# Patient Record
Sex: FEMALE | Race: WHITE | URBAN - METROPOLITAN AREA
[De-identification: names, ages, dates, MRNs, and addresses within clinical notes are randomized per-mention and may not be internally consistent; named-entity substitution may affect disease eponyms.]

---

## 2017-12-05 ENCOUNTER — APPOINTMENT (OUTPATIENT)
Dept: GENERAL RADIOLOGY | Facility: CLINIC | Age: 62
End: 2017-12-05
Attending: EMERGENCY MEDICINE
Payer: COMMERCIAL

## 2017-12-05 ENCOUNTER — HOSPITAL ENCOUNTER (EMERGENCY)
Facility: CLINIC | Age: 62
Discharge: HOME | End: 2017-12-05
Attending: EMERGENCY MEDICINE | Admitting: EMERGENCY MEDICINE
Payer: COMMERCIAL

## 2017-12-05 VITALS
BODY MASS INDEX: 31.54 KG/M2 | DIASTOLIC BLOOD PRESSURE: 84 MMHG | WEIGHT: 178 LBS | HEIGHT: 63 IN | SYSTOLIC BLOOD PRESSURE: 147 MMHG | TEMPERATURE: 98 F | HEART RATE: 87 BPM | RESPIRATION RATE: 18 BRPM | OXYGEN SATURATION: 96 %
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
## 2017-12-05 DIAGNOSIS — S01.81XA LACERATION OF BROW WITHOUT COMPLICATION, INITIAL ENCOUNTER: ICD-10-CM

## 2017-12-05 DIAGNOSIS — S42.211A FX HUMERAL NECK, RIGHT, CLOSED, INITIAL ENCOUNTER: ICD-10-CM

## 2017-12-05 PROCEDURE — 73030 X-RAY EXAM OF SHOULDER: CPT | Mod: RT

## 2017-12-05 PROCEDURE — 99284 EMERGENCY DEPT VISIT MOD MDM: CPT | Mod: 25

## 2017-12-05 PROCEDURE — 23620 CLTX GR HMRL TBRS FX WO MNPJ: CPT | Mod: RT

## 2017-12-05 ASSESSMENT — ENCOUNTER SYMPTOMS
HEADACHES: 0
BACK PAIN: 0
NECK PAIN: 0
ABDOMINAL PAIN: 0
VOMITING: 0

## 2017-12-05 NOTE — ED AVS SNAPSHOT
Emergency Department    [address] [place] [address]    Phone:  [phone]    Fax:  [phone]                                       Sarahy Joshi   MRN: [id number]    Department:   Emergency Department   Date of Visit:  12/5/2017           After Visit Summary Signature Page     I have received my discharge instructions, and my questions have been answered. I have discussed any challenges I see with this plan with the nurse or doctor.    ..........................................................................................................................................  Patient/Patient Representative Signature      ..........................................................................................................................................  Patient Representative Print Name and Relationship to Patient    ..................................................               ................................................  Date                                            Time    ..........................................................................................................................................  Reviewed by Signature/Title    ...................................................              ..............................................  Date                                                            Time

## 2017-12-05 NOTE — ED AVS SNAPSHOT
Emergency Department    [address] [facility] [address]    Phone:  [phone]    Fax:  [phone]                                       Sarahy Joshi   MRN: [id number]    Department:   Emergency Department   Date of Visit:  12/5/2017           Patient Information     Date Of Birth          1955        Your diagnoses for this visit were:     Fx humeral neck, right, closed, initial encounter     Laceration of brow without complication, initial encounter     Fall, initial encounter        You were seen by Deandra Gilliam MD.      Follow-up Information     Follow up with Gentry Bynum MD. Schedule an appointment as soon as possible for a visit in 3 days.    Specialty:  Orthopaedic Surgery    Contact information:    [facility]  [address] [facility] [address]  [phone]          Discharge Instructions       Ice to your shoulder, shoulder immobilizer, use your pain medicine as directed.  Set an appointment up with the orthopedic doctor later this week.        Discharge References/Attachments     FRACTURE, SHOULDER (ENGLISH)      24 Hour Appointment Hotline       To make an appointment at any [facility], call [phone] ([phone]). If you don't have a family doctor or clinic, we will help you find one. [facility] clinics are conveniently located to serve the needs of you and your family.             Review of your medicines      Notice     You have not been prescribed any medications.            Procedures and tests performed during your visit     Shoulder XR, G/E 3 views, right      Orders Needing Specimen Collection     None      Pending Results     No orders found from 12/3/2017 to 12/6/2017.            Pending Culture Results     No orders found from 12/3/2017 to 12/6/2017.            Pending Results Instructions     If you had any lab results that were not finalized at the time of your Discharge, you can call the ED Lab Result RN at [phone].  You will be contacted by this team for any positive Lab results or changes in treatment. The nurses are available 7 days a week from 10A to 6:30P.  You can leave a message 24 hours per day and they will return your call.        Test Results From Your Hospital Stay        12/5/2017  5:40 PM      Narrative     SHOULDER RIGHT THREE OR MORE VIEWS    12/5/2017 5:20 PM     HISTORY: Fall Sunday morning, swelling and pain.    COMPARISON: None.        Impression     IMPRESSION: There is a fracture through the greater tuberosity which  is nondisplaced. There is also a nondisplaced fracture through the  surgical neck of the proximal humerus. There is no evidence for  dislocation.    LEVI WINTERS MD                Clinical Quality Measure: Blood Pressure Screening     Your blood pressure was checked while you were in the emergency department today. The last reading we obtained was  BP: 147/84 . Please read the guidelines below about what these numbers mean and what you should do about them.  If your systolic blood pressure (the top number) is less than 120 and your diastolic blood pressure (the bottom number) is less than 80, then your blood pressure is normal. There is nothing more that you need to do about it.  If your systolic blood pressure (the top number) is 120-139 or your diastolic blood pressure (the bottom number) is 80-89, your blood pressure may be higher than it should be. You should have your blood pressure rechecked within a year by a primary care provider.  If your systolic blood pressure (the top number) is 140 or greater or your diastolic blood pressure (the bottom number) is 90 or greater, you may have high blood pressure. High blood pressure is treatable, but if left untreated over time it can put you at risk for heart attack, stroke, or kidney failure. You should have your blood pressure rechecked by a primary care provider within the next 4 weeks.  If your provider in the emergency department today gave  "you specific instructions to follow-up with your doctor or provider even sooner than that, you should follow that instruction and not wait for up to 4 weeks for your follow-up visit.        Thank you for choosing [facility]       Thank you for choosing [facility] for your care. Our goal is always to provide you with excellent care. Hearing back from our patients is one way we can continue to improve our services. Please take a few minutes to complete the written survey that you may receive in the mail after you visit with us. Thank you!        [employer] Information     [employer] lets you send messages to your doctor, view your test results, renew your prescriptions, schedule appointments and more. To sign up, go to [email or web address] . Click on \"Log in\" on the left side of the screen, which will take you to the Welcome page. Then click on \"Sign up Now\" on the right side of the page.     You will be asked to enter the access code listed below, as well as some personal information. Please follow the directions to create your username and password.     Your access code is: [email or web address]  Expires: 3/5/2018  5:07 PM     Your access code will  in 90 days. If you need help or a new code, please call your [facility] clinic or [phone].        Care EveryWhere ID     This is your Care EveryWhere ID. This could be used by other organizations to access your [facility] medical records  [id number]        Equal Access to Services     ROSALIA FINK : Hadii perla Burroughs, waaxda greta, qaybta mejiaalmabrant vargas . So [facility] [phone].    ATENCIÓN: Si habla español, tiene a pa disposición servicios gratuitos de asistencia lingüística. Llame al [phone].    We comply with applicable federal civil rights laws and [place] laws. We do not discriminate on the basis of race, color, national origin, age, disability, sex, sexual orientation, or gender identity.            After " Visit Summary       This is your record. Keep this with you and show to your community pharmacist(s) and doctor(s) at your next visit.

## 2017-12-05 NOTE — DISCHARGE INSTRUCTIONS
Ice to your shoulder, shoulder immobilizer, use your pain medicine as directed.  Set an appointment up with the orthopedic doctor later this week.

## 2017-12-05 NOTE — ED PROVIDER NOTES
"  History     Chief Complaint:  Fall      HPI   Sarahy Joshi is a 62 year old diabetic female who presents after a fall 2 days ago where she hit her head on tile after tripping on her purse overnight, now experiencing right shoulder pain. She states the pain radiates down her right arm, and that she is unable to move it. Patient denies headache and vomiting. Neck pain, back pain, chest pain and abdominal pain are also denied by the patient.  She did bump her head and had a cut in her right eyebrow which her sister glued and she has no complaints of pain or vision problems.  No neurologic symptoms distally.    Allergies:  Hmg-Coa inhibitors     Medications:    Norco 10 mg  MS contin 15 mg    Past Medical History:    Arthritis  Depressin  Diabetes  Fibromyalgia  GERD  HTN    Past Surgical History:    Appendectomy  Cholecystectomy  ENT surgery  Gyn surgery  Orthopedic surgery    Family History:    History reviewed. No pertinent family history.    Social History:  Marital Status:   Presents to the ED with family.   Tobacco Use: Current 0.50 ppd smoker  Alcohol Use: No    Review of Systems   Cardiovascular: Negative for chest pain.   Gastrointestinal: Negative for abdominal pain and vomiting.   Musculoskeletal: Negative for back pain and neck pain.        Positive for shoulder pain.    Neurological: Negative for headaches.   All other systems reviewed and are negative.      Physical Exam   First Vitals:  BP: 147/84  Pulse: 87  Temp: 98  F (36.7  C)  Resp: 18  Height: 160 cm (5' 3\")  Weight: 80.7 kg (178 lb)  SpO2: 96 %      Physical Exam  I reviewed the vital signs and nursing notes.  General: The patient is holding her right shoulder and grimaces in pain if she accidentally moves.  Musculoskeletal: No neck tenderness, she has pain and some swelling right over the shoulder itself.  Some distal clavicle tenderness but no clavicle deformity.  Left arm is normal, elbow and wrist are normal on the right.  Lower " extremities are normal without tenderness, no spinal tenderness.  Neuro: Normal sensation to light touch in both hands, normal wrist flexion and extension.  I cannot test strength of the upper arm because of the pain.  She is awake and alert, GCS of 15.  HEENT: There is a 3 cm laceration that has good wound edge approximation and is scabbed over and the right eyebrow.  No blood from the nose or ears.  No other evidence of facial or scalp trauma.  Eyes: Normal.  Pupils are equal.  Skin: The laceration in the right eyebrow but no other bruises are noted.    Cardiovascular: Heart sounds are normal, some mild right anterior chest wall tenderness near the shoulder but no bruising.  No other rib tenderness.  Strong radial pulses bilaterally and normal capillary refill with good perfusion.  Pulmonary: Lungs are clear, no rib tenderness posteriorly just right upper anterior chest wall.  Abdomen: Soft and nontender.    Emergency Department Course     Imaging:  Right Shoulder XR, per radiology:   IMPRESSION: There is a fracture through the greater tuberosity which  is nondisplaced. There is also a nondisplaced fracture through the  surgical neck of the proximal humerus. There is no evidence for  dislocation.    Radiographic findings were communicated with the patient who voiced understanding of the findings.    Emergency Department Course:  Nursing notes and vitals reviewed.  1655: I performed an exam of the patient as documented above.  The above workup was undertaken.  1733: I rechecked the patient and discussed results.  Findings and plan explained to the Patient. Patient discharged home, status improved, with instructions regarding supportive care, medications, and reasons to return as well as the importance of close follow-up was reviewed.     Impression & Plan      Medical Decision Making:  Patient comes in after falling 2 days ago.  She has a lot of pain in her shoulder, x-ray confirms a humeral neck fracture with  minimal displacement.  She has a laceration above her right eye in the eyebrow which looks to be healing very well and does not need any further management.  She has no neck pain or neurologic symptoms.  CMS is intact distally in her hand and she is able to extend her wrist.  She was placed in a shoulder immobilizer and will see or so later this week.  No other apparent injuries.    Diagnosis:    ICD-10-CM    1. Fx humeral neck, right, closed, initial encounter S42.211A    2. Laceration of brow without complication, initial encounter S01.81XA    3. Fall, initial encounter W19.XXXA        Disposition:  Discharged to home.     Ice to your shoulder, should immobilizer, use your pain medication as directed. Set up an appointment with orthopedic doctor later this week.        I, Mariza Wolf, am serving as a scribe on 12/5/2017 at 4:55 PM to personally document services performed by Dr. Gilliam based on my observations and the provider's statements to me.    EMERGENCY DEPARTMENT       Deandra Gilliam MD  12/05/17 [id number]

## 2019-01-30 ENCOUNTER — HOSPITAL ENCOUNTER (INPATIENT)
Age: 64
LOS: 5 days | Discharge: HOME | DRG: 194 | End: 2019-02-04
Attending: EMERGENCY MEDICINE | Admitting: NEUROMUSCULOSKELETAL MEDICINE & OMM
Payer: MEDICARE

## 2019-01-30 ENCOUNTER — APPOINTMENT (OUTPATIENT)
Dept: GENERAL RADIOLOGY | Age: 64
DRG: 194 | End: 2019-01-30
Payer: MEDICARE

## 2019-01-30 DIAGNOSIS — D64.9 ANEMIA, UNSPECIFIED TYPE: ICD-10-CM

## 2019-01-30 DIAGNOSIS — J18.9 PNEUMONIA DUE TO ORGANISM: Primary | ICD-10-CM

## 2019-01-30 LAB
ABO/RH: NORMAL
ALBUMIN SERPL-MCNC: 3.6 G/DL (ref 3.5–5.2)
ALP BLD-CCNC: 95 U/L (ref 35–104)
ALT SERPL-CCNC: 17 U/L (ref 0–32)
ANION GAP SERPL CALCULATED.3IONS-SCNC: 14 MMOL/L (ref 7–16)
ANISOCYTOSIS: ABNORMAL
ANTIBODY SCREEN: NORMAL
APTT: 30.7 SEC (ref 24.5–35.1)
AST SERPL-CCNC: 25 U/L (ref 0–31)
BASOPHILS ABSOLUTE: 0.05 E9/L (ref 0–0.2)
BASOPHILS RELATIVE PERCENT: 0.6 % (ref 0–2)
BILIRUB SERPL-MCNC: 0.2 MG/DL (ref 0–1.2)
BLOOD BANK DISPENSE STATUS: NORMAL
BLOOD BANK DISPENSE STATUS: NORMAL
BLOOD BANK PRODUCT CODE: NORMAL
BLOOD BANK PRODUCT CODE: NORMAL
BPU ID: NORMAL
BPU ID: NORMAL
BUN BLDV-MCNC: 9 MG/DL (ref 8–23)
BURR CELLS: ABNORMAL
CALCIUM SERPL-MCNC: 8.8 MG/DL (ref 8.6–10.2)
CHLORIDE BLD-SCNC: 95 MMOL/L (ref 98–107)
CO2: 23 MMOL/L (ref 22–29)
CREAT SERPL-MCNC: 0.5 MG/DL (ref 0.5–1)
DESCRIPTION BLOOD BANK: NORMAL
DESCRIPTION BLOOD BANK: NORMAL
EOSINOPHILS ABSOLUTE: 0.1 E9/L (ref 0.05–0.5)
EOSINOPHILS RELATIVE PERCENT: 1.3 % (ref 0–6)
GFR AFRICAN AMERICAN: >60
GFR NON-AFRICAN AMERICAN: >60 ML/MIN/1.73
GLUCOSE BLD-MCNC: 171 MG/DL (ref 74–99)
HCT VFR BLD CALC: 27.4 % (ref 34–48)
HEMOGLOBIN: 6.9 G/DL (ref 11.5–15.5)
HYPOCHROMIA: ABNORMAL
IMMATURE GRANULOCYTES #: 0.02 E9/L
IMMATURE GRANULOCYTES %: 0.3 % (ref 0–5)
INR BLD: 1.1
LYMPHOCYTES ABSOLUTE: 0.83 E9/L (ref 1.5–4)
LYMPHOCYTES RELATIVE PERCENT: 10.7 % (ref 20–42)
MCH RBC QN AUTO: 17.9 PG (ref 26–35)
MCHC RBC AUTO-ENTMCNC: 25.2 % (ref 32–34.5)
MCV RBC AUTO: 71 FL (ref 80–99.9)
METER GLUCOSE: 83 MG/DL (ref 74–99)
MONOCYTES ABSOLUTE: 0.68 E9/L (ref 0.1–0.95)
MONOCYTES RELATIVE PERCENT: 8.8 % (ref 2–12)
NEUTROPHILS ABSOLUTE: 6.09 E9/L (ref 1.8–7.3)
NEUTROPHILS RELATIVE PERCENT: 78.3 % (ref 43–80)
OVALOCYTES: ABNORMAL
PDW BLD-RTO: 23.3 FL (ref 11.5–15)
PLATELET # BLD: 291 E9/L (ref 130–450)
PMV BLD AUTO: 10.7 FL (ref 7–12)
POIKILOCYTES: ABNORMAL
POLYCHROMASIA: ABNORMAL
POTASSIUM SERPL-SCNC: 4.1 MMOL/L (ref 3.5–5)
PROTHROMBIN TIME: 13.1 SEC (ref 9.3–12.4)
RBC # BLD: 3.86 E12/L (ref 3.5–5.5)
SODIUM BLD-SCNC: 132 MMOL/L (ref 132–146)
TEAR DROP CELLS: ABNORMAL
TOTAL PROTEIN: 6.8 G/DL (ref 6.4–8.3)
TROPONIN: <0.01 NG/ML (ref 0–0.03)
WBC # BLD: 7.8 E9/L (ref 4.5–11.5)

## 2019-01-30 PROCEDURE — 85730 THROMBOPLASTIN TIME PARTIAL: CPT

## 2019-01-30 PROCEDURE — 86901 BLOOD TYPING SEROLOGIC RH(D): CPT

## 2019-01-30 PROCEDURE — 85610 PROTHROMBIN TIME: CPT

## 2019-01-30 PROCEDURE — 96374 THER/PROPH/DIAG INJ IV PUSH: CPT

## 2019-01-30 PROCEDURE — 6360000002 HC RX W HCPCS: Performed by: EMERGENCY MEDICINE

## 2019-01-30 PROCEDURE — 86850 RBC ANTIBODY SCREEN: CPT

## 2019-01-30 PROCEDURE — 87040 BLOOD CULTURE FOR BACTERIA: CPT

## 2019-01-30 PROCEDURE — 2580000003 HC RX 258: Performed by: EMERGENCY MEDICINE

## 2019-01-30 PROCEDURE — 86923 COMPATIBILITY TEST ELECTRIC: CPT

## 2019-01-30 PROCEDURE — 99285 EMERGENCY DEPT VISIT HI MDM: CPT

## 2019-01-30 PROCEDURE — 71045 X-RAY EXAM CHEST 1 VIEW: CPT

## 2019-01-30 PROCEDURE — 94760 N-INVAS EAR/PLS OXIMETRY 1: CPT

## 2019-01-30 PROCEDURE — 36430 TRANSFUSION BLD/BLD COMPNT: CPT

## 2019-01-30 PROCEDURE — 86900 BLOOD TYPING SEROLOGIC ABO: CPT

## 2019-01-30 PROCEDURE — 84484 ASSAY OF TROPONIN QUANT: CPT

## 2019-01-30 PROCEDURE — P9016 RBC LEUKOCYTES REDUCED: HCPCS

## 2019-01-30 PROCEDURE — 80053 COMPREHEN METABOLIC PANEL: CPT

## 2019-01-30 PROCEDURE — 82962 GLUCOSE BLOOD TEST: CPT

## 2019-01-30 PROCEDURE — 36415 COLL VENOUS BLD VENIPUNCTURE: CPT

## 2019-01-30 PROCEDURE — 85025 COMPLETE CBC W/AUTO DIFF WBC: CPT

## 2019-01-30 PROCEDURE — 1200000000 HC SEMI PRIVATE

## 2019-01-30 RX ORDER — ROSUVASTATIN CALCIUM 20 MG/1
20 TABLET, COATED ORAL DAILY
COMMUNITY

## 2019-01-30 RX ORDER — 0.9 % SODIUM CHLORIDE 0.9 %
250 INTRAVENOUS SOLUTION INTRAVENOUS ONCE
Status: COMPLETED | OUTPATIENT
Start: 2019-01-30 | End: 2019-01-31

## 2019-01-30 RX ORDER — GLIPIZIDE 5 MG/1
10 TABLET ORAL
COMMUNITY

## 2019-01-30 RX ORDER — BUTALBITAL, ASPIRIN, AND CAFFEINE 325; 50; 40 MG/1; MG/1; MG/1
1 CAPSULE ORAL EVERY 8 HOURS PRN
COMMUNITY

## 2019-01-30 RX ORDER — PANTOPRAZOLE SODIUM 40 MG/1
40 TABLET, DELAYED RELEASE ORAL DAILY
COMMUNITY

## 2019-01-30 RX ORDER — MORPHINE SULFATE 30 MG/1
30 TABLET, FILM COATED, EXTENDED RELEASE ORAL 2 TIMES DAILY
Status: ON HOLD | COMMUNITY
End: 2019-02-04 | Stop reason: HOSPADM

## 2019-01-30 RX ORDER — NAPROXEN 500 MG/1
500 TABLET ORAL 2 TIMES DAILY PRN
Qty: 28 TABLET | Refills: 0 | Status: SHIPPED | OUTPATIENT
Start: 2019-01-30 | End: 2019-01-30 | Stop reason: CLARIF

## 2019-01-30 RX ORDER — HYDROCODONE BITARTRATE AND ACETAMINOPHEN 10; 325 MG/1; MG/1
1 TABLET ORAL EVERY 8 HOURS PRN
Status: ON HOLD | COMMUNITY
End: 2019-02-04 | Stop reason: HOSPADM

## 2019-01-30 RX ORDER — IBUPROFEN 200 MG
200 TABLET ORAL EVERY 6 HOURS PRN
COMMUNITY

## 2019-01-30 RX ADMIN — DEXTROSE MONOHYDRATE 1 G: 50 INJECTION, SOLUTION INTRAVENOUS at 16:52

## 2019-01-30 RX ADMIN — SODIUM CHLORIDE 250 ML: 9 INJECTION, SOLUTION INTRAVENOUS at 17:37

## 2019-01-30 ASSESSMENT — PAIN SCALES - GENERAL
PAINLEVEL_OUTOF10: 5
PAINLEVEL_OUTOF10: 8

## 2019-01-30 ASSESSMENT — PAIN DESCRIPTION - FREQUENCY: FREQUENCY: CONTINUOUS

## 2019-01-30 ASSESSMENT — PAIN DESCRIPTION - ONSET: ONSET: ON-GOING

## 2019-01-30 ASSESSMENT — PAIN DESCRIPTION - PAIN TYPE: TYPE: CHRONIC PAIN

## 2019-01-30 ASSESSMENT — PAIN DESCRIPTION - PROGRESSION: CLINICAL_PROGRESSION: NOT CHANGED

## 2019-01-30 ASSESSMENT — PAIN DESCRIPTION - LOCATION: LOCATION: GENERALIZED

## 2019-01-31 PROBLEM — D64.9 SEVERE ANEMIA: Status: ACTIVE | Noted: 2019-01-31

## 2019-01-31 LAB
ALBUMIN SERPL-MCNC: 4 G/DL (ref 3.5–5.2)
ALP BLD-CCNC: 104 U/L (ref 35–104)
ALT SERPL-CCNC: 17 U/L (ref 0–32)
ANION GAP SERPL CALCULATED.3IONS-SCNC: 12 MMOL/L (ref 7–16)
ANISOCYTOSIS: ABNORMAL
AST SERPL-CCNC: 21 U/L (ref 0–31)
BASOPHILS ABSOLUTE: 0.05 E9/L (ref 0–0.2)
BASOPHILS RELATIVE PERCENT: 0.7 % (ref 0–2)
BILIRUB SERPL-MCNC: 0.3 MG/DL (ref 0–1.2)
BUN BLDV-MCNC: 6 MG/DL (ref 8–23)
CALCIUM SERPL-MCNC: 9.2 MG/DL (ref 8.6–10.2)
CHLORIDE BLD-SCNC: 103 MMOL/L (ref 98–107)
CO2: 28 MMOL/L (ref 22–29)
CREAT SERPL-MCNC: 0.5 MG/DL (ref 0.5–1)
EOSINOPHILS ABSOLUTE: 0.02 E9/L (ref 0.05–0.5)
EOSINOPHILS RELATIVE PERCENT: 0.3 % (ref 0–6)
FERRITIN: 13 NG/ML
FILM ARRAY ADENOVIRUS: ABNORMAL
FILM ARRAY BORDETELLA PERTUSSIS: ABNORMAL
FILM ARRAY CHLAMYDOPHILIA PNEUMONIAE: ABNORMAL
FILM ARRAY CORONAVIRUS 229E: ABNORMAL
FILM ARRAY CORONAVIRUS HKU1: ABNORMAL
FILM ARRAY CORONAVIRUS NL63: ABNORMAL
FILM ARRAY CORONAVIRUS OC43: ABNORMAL
FILM ARRAY INFLUENZA A VIRUS 09H1: ABNORMAL
FILM ARRAY INFLUENZA A VIRUS H1: ABNORMAL
FILM ARRAY INFLUENZA A VIRUS H3: ABNORMAL
FILM ARRAY INFLUENZA A VIRUS: ABNORMAL
FILM ARRAY INFLUENZA B: ABNORMAL
FILM ARRAY MYCOPLASMA PNEUMONIAE: ABNORMAL
FILM ARRAY PARAINFLUENZA VIRUS 1: ABNORMAL
FILM ARRAY PARAINFLUENZA VIRUS 2: ABNORMAL
FILM ARRAY PARAINFLUENZA VIRUS 3: ABNORMAL
FILM ARRAY PARAINFLUENZA VIRUS 4: ABNORMAL
FILM ARRAY RESPIRATORY SYNCITIAL VIRUS: ABNORMAL
FILM ARRAY RHINOVIRUS/ENTEROVIRUS: ABNORMAL
FOLATE: >20 NG/ML (ref 4.8–24.2)
GFR AFRICAN AMERICAN: >60
GFR NON-AFRICAN AMERICAN: >60 ML/MIN/1.73
GLUCOSE BLD-MCNC: 119 MG/DL (ref 74–99)
HBA1C MFR BLD: 5.7 % (ref 4–5.6)
HCT VFR BLD CALC: 32.5 % (ref 34–48)
HCT VFR BLD CALC: 34.2 % (ref 34–48)
HCT VFR BLD CALC: 34.7 % (ref 34–48)
HCT VFR BLD CALC: 35.1 % (ref 34–48)
HEMOGLOBIN: 9.1 G/DL (ref 11.5–15.5)
HEMOGLOBIN: 9.4 G/DL (ref 11.5–15.5)
HEMOGLOBIN: 9.5 G/DL (ref 11.5–15.5)
HEMOGLOBIN: 9.9 G/DL (ref 11.5–15.5)
HYPOCHROMIA: ABNORMAL
IMMATURE GRANULOCYTES #: 0.04 E9/L
IMMATURE GRANULOCYTES %: 0.6 % (ref 0–5)
IMMATURE RETIC FRACT: 41.8 % (ref 3–15.9)
IRON SATURATION: 6 % (ref 15–50)
IRON: 24 MCG/DL (ref 37–145)
LYMPHOCYTES ABSOLUTE: 1.07 E9/L (ref 1.5–4)
LYMPHOCYTES RELATIVE PERCENT: 15.9 % (ref 20–42)
MCH RBC QN AUTO: 20.3 PG (ref 26–35)
MCH RBC QN AUTO: 20.5 PG (ref 26–35)
MCHC RBC AUTO-ENTMCNC: 27.8 % (ref 32–34.5)
MCHC RBC AUTO-ENTMCNC: 28.2 % (ref 32–34.5)
MCV RBC AUTO: 72.7 FL (ref 80–99.9)
MCV RBC AUTO: 73.2 FL (ref 80–99.9)
METER GLUCOSE: 109 MG/DL (ref 74–99)
METER GLUCOSE: 119 MG/DL (ref 74–99)
METER GLUCOSE: 125 MG/DL (ref 74–99)
METER GLUCOSE: 200 MG/DL (ref 74–99)
MONOCYTES ABSOLUTE: 0.78 E9/L (ref 0.1–0.95)
MONOCYTES RELATIVE PERCENT: 11.6 % (ref 2–12)
NEUTROPHILS ABSOLUTE: 4.77 E9/L (ref 1.8–7.3)
NEUTROPHILS RELATIVE PERCENT: 70.9 % (ref 43–80)
ORGANISM: ABNORMAL
OVALOCYTES: ABNORMAL
PATHOLOGIST REVIEW: NORMAL
PDW BLD-RTO: 24.2 FL (ref 11.5–15)
PDW BLD-RTO: 24.2 FL (ref 11.5–15)
PLATELET # BLD: 300 E9/L (ref 130–450)
PLATELET # BLD: 302 E9/L (ref 130–450)
PMV BLD AUTO: 10.3 FL (ref 7–12)
PMV BLD AUTO: 10.7 FL (ref 7–12)
POIKILOCYTES: ABNORMAL
POLYCHROMASIA: ABNORMAL
POTASSIUM SERPL-SCNC: 4.2 MMOL/L (ref 3.5–5)
RBC # BLD: 4.67 E12/L (ref 3.5–5.5)
RBC # BLD: 4.83 E12/L (ref 3.5–5.5)
RETIC HGB EQUIVALENT: 16.9 PG (ref 28.2–36.6)
RETICULOCYTE ABSOLUTE COUNT: 0.05 E12/L
RETICULOCYTE COUNT PCT: 1.1 % (ref 0.4–1.9)
SODIUM BLD-SCNC: 143 MMOL/L (ref 132–146)
TEAR DROP CELLS: ABNORMAL
TOTAL IRON BINDING CAPACITY: 411 MCG/DL (ref 250–450)
TOTAL PROTEIN: 7.1 G/DL (ref 6.4–8.3)
TRANSFERRIN: 343 MG/DL (ref 200–360)
TSH SERPL DL<=0.05 MIU/L-ACNC: 0.78 UIU/ML (ref 0.27–4.2)
VITAMIN B-12: 1850 PG/ML (ref 211–946)
WBC # BLD: 6.6 E9/L (ref 4.5–11.5)
WBC # BLD: 6.7 E9/L (ref 4.5–11.5)

## 2019-01-31 PROCEDURE — 36415 COLL VENOUS BLD VENIPUNCTURE: CPT

## 2019-01-31 PROCEDURE — 81270 JAK2 GENE: CPT

## 2019-01-31 PROCEDURE — 84466 ASSAY OF TRANSFERRIN: CPT

## 2019-01-31 PROCEDURE — 84443 ASSAY THYROID STIM HORMONE: CPT

## 2019-01-31 PROCEDURE — 85045 AUTOMATED RETICULOCYTE COUNT: CPT

## 2019-01-31 PROCEDURE — 82746 ASSAY OF FOLIC ACID SERUM: CPT

## 2019-01-31 PROCEDURE — 82962 GLUCOSE BLOOD TEST: CPT

## 2019-01-31 PROCEDURE — 87633 RESP VIRUS 12-25 TARGETS: CPT

## 2019-01-31 PROCEDURE — 81219 CALR GENE COM VARIANTS: CPT

## 2019-01-31 PROCEDURE — 82728 ASSAY OF FERRITIN: CPT

## 2019-01-31 PROCEDURE — 6370000000 HC RX 637 (ALT 250 FOR IP): Performed by: INTERNAL MEDICINE

## 2019-01-31 PROCEDURE — 2580000003 HC RX 258: Performed by: NEUROMUSCULOSKELETAL MEDICINE & OMM

## 2019-01-31 PROCEDURE — 87206 SMEAR FLUORESCENT/ACID STAI: CPT

## 2019-01-31 PROCEDURE — 85025 COMPLETE CBC W/AUTO DIFF WBC: CPT

## 2019-01-31 PROCEDURE — G0328 FECAL BLOOD SCRN IMMUNOASSAY: HCPCS

## 2019-01-31 PROCEDURE — 82607 VITAMIN B-12: CPT

## 2019-01-31 PROCEDURE — 6370000000 HC RX 637 (ALT 250 FOR IP): Performed by: NEUROMUSCULOSKELETAL MEDICINE & OMM

## 2019-01-31 PROCEDURE — 80053 COMPREHEN METABOLIC PANEL: CPT

## 2019-01-31 PROCEDURE — 87070 CULTURE OTHR SPECIMN AEROBIC: CPT

## 2019-01-31 PROCEDURE — 2500000003 HC RX 250 WO HCPCS: Performed by: EMERGENCY MEDICINE

## 2019-01-31 PROCEDURE — 83540 ASSAY OF IRON: CPT

## 2019-01-31 PROCEDURE — 87486 CHLMYD PNEUM DNA AMP PROBE: CPT

## 2019-01-31 PROCEDURE — 85014 HEMATOCRIT: CPT

## 2019-01-31 PROCEDURE — 87324 CLOSTRIDIUM AG IA: CPT

## 2019-01-31 PROCEDURE — 83550 IRON BINDING TEST: CPT

## 2019-01-31 PROCEDURE — 6360000002 HC RX W HCPCS: Performed by: CLINICAL NURSE SPECIALIST

## 2019-01-31 PROCEDURE — C9113 INJ PANTOPRAZOLE SODIUM, VIA: HCPCS | Performed by: CLINICAL NURSE SPECIALIST

## 2019-01-31 PROCEDURE — 2580000003 HC RX 258: Performed by: EMERGENCY MEDICINE

## 2019-01-31 PROCEDURE — 87450 HC DIRECT STREP B ANTIGEN: CPT

## 2019-01-31 PROCEDURE — 87425 ROTAVIRUS AG IA: CPT

## 2019-01-31 PROCEDURE — 94664 DEMO&/EVAL PT USE INHALER: CPT

## 2019-01-31 PROCEDURE — 83036 HEMOGLOBIN GLYCOSYLATED A1C: CPT

## 2019-01-31 PROCEDURE — 87581 M.PNEUMON DNA AMP PROBE: CPT

## 2019-01-31 PROCEDURE — 87798 DETECT AGENT NOS DNA AMP: CPT

## 2019-01-31 PROCEDURE — 1200000000 HC SEMI PRIVATE

## 2019-01-31 PROCEDURE — 82668 ASSAY OF ERYTHROPOIETIN: CPT

## 2019-01-31 PROCEDURE — 85027 COMPLETE CBC AUTOMATED: CPT

## 2019-01-31 PROCEDURE — 85018 HEMOGLOBIN: CPT

## 2019-01-31 PROCEDURE — 87045 FECES CULTURE AEROBIC BACT: CPT

## 2019-01-31 PROCEDURE — 2580000003 HC RX 258: Performed by: CLINICAL NURSE SPECIALIST

## 2019-01-31 PROCEDURE — 94640 AIRWAY INHALATION TREATMENT: CPT

## 2019-01-31 PROCEDURE — 6360000002 HC RX W HCPCS: Performed by: INTERNAL MEDICINE

## 2019-01-31 RX ORDER — GUAIFENESIN/DEXTROMETHORPHAN 100-10MG/5
5 SYRUP ORAL EVERY 6 HOURS PRN
Status: DISCONTINUED | OUTPATIENT
Start: 2019-01-31 | End: 2019-02-04 | Stop reason: HOSPADM

## 2019-01-31 RX ORDER — PANTOPRAZOLE SODIUM 40 MG/1
40 TABLET, DELAYED RELEASE ORAL DAILY
Status: DISCONTINUED | OUTPATIENT
Start: 2019-01-31 | End: 2019-01-31

## 2019-01-31 RX ORDER — TRAZODONE HYDROCHLORIDE 50 MG/1
50 TABLET ORAL NIGHTLY
Status: DISCONTINUED | OUTPATIENT
Start: 2019-01-31 | End: 2019-02-04 | Stop reason: HOSPADM

## 2019-01-31 RX ORDER — BUPROPION HYDROCHLORIDE 100 MG/1
100 TABLET, EXTENDED RELEASE ORAL DAILY
Status: DISCONTINUED | OUTPATIENT
Start: 2019-01-31 | End: 2019-02-04 | Stop reason: HOSPADM

## 2019-01-31 RX ORDER — GABAPENTIN 300 MG/1
300 CAPSULE ORAL
Status: DISCONTINUED | OUTPATIENT
Start: 2019-01-31 | End: 2019-02-04 | Stop reason: HOSPADM

## 2019-01-31 RX ORDER — NORTRIPTYLINE HYDROCHLORIDE 10 MG/1
10 CAPSULE ORAL NIGHTLY
Status: DISCONTINUED | OUTPATIENT
Start: 2019-01-31 | End: 2019-02-04 | Stop reason: HOSPADM

## 2019-01-31 RX ORDER — GLIPIZIDE 5 MG/1
5 TABLET ORAL
Status: DISCONTINUED | OUTPATIENT
Start: 2019-01-31 | End: 2019-02-04 | Stop reason: HOSPADM

## 2019-01-31 RX ORDER — ALBUTEROL SULFATE 2.5 MG/3ML
2.5 SOLUTION RESPIRATORY (INHALATION) EVERY 6 HOURS PRN
Status: DISCONTINUED | OUTPATIENT
Start: 2019-01-31 | End: 2019-02-04 | Stop reason: HOSPADM

## 2019-01-31 RX ORDER — SPIRONOLACTONE 25 MG/1
25 TABLET ORAL 2 TIMES DAILY
Status: DISCONTINUED | OUTPATIENT
Start: 2019-01-31 | End: 2019-02-04 | Stop reason: HOSPADM

## 2019-01-31 RX ORDER — SPIRONOLACTONE AND HYDROCHLOROTHIAZIDE 25; 25 MG/1; MG/1
1 TABLET ORAL 2 TIMES DAILY
Status: DISCONTINUED | OUTPATIENT
Start: 2019-01-31 | End: 2019-01-31 | Stop reason: CLARIF

## 2019-01-31 RX ORDER — SIMVASTATIN 20 MG
10 TABLET ORAL NIGHTLY
Status: DISCONTINUED | OUTPATIENT
Start: 2019-01-31 | End: 2019-01-31 | Stop reason: ALTCHOICE

## 2019-01-31 RX ORDER — HYDROCHLOROTHIAZIDE 25 MG/1
25 TABLET ORAL 2 TIMES DAILY
Status: DISCONTINUED | OUTPATIENT
Start: 2019-01-31 | End: 2019-02-04 | Stop reason: HOSPADM

## 2019-01-31 RX ORDER — LIDOCAINE 4 G/G
1 PATCH TOPICAL DAILY
Status: DISCONTINUED | OUTPATIENT
Start: 2019-01-31 | End: 2019-02-04 | Stop reason: HOSPADM

## 2019-01-31 RX ORDER — BUPROPION HYDROCHLORIDE 100 MG/1
200 TABLET, EXTENDED RELEASE ORAL NIGHTLY
Status: DISCONTINUED | OUTPATIENT
Start: 2019-01-31 | End: 2019-02-04 | Stop reason: HOSPADM

## 2019-01-31 RX ORDER — ONDANSETRON 2 MG/ML
4 INJECTION INTRAMUSCULAR; INTRAVENOUS EVERY 6 HOURS PRN
Status: DISCONTINUED | OUTPATIENT
Start: 2019-01-31 | End: 2019-02-04 | Stop reason: HOSPADM

## 2019-01-31 RX ORDER — ACETAMINOPHEN 325 MG/1
650 TABLET ORAL EVERY 6 HOURS PRN
Status: DISCONTINUED | OUTPATIENT
Start: 2019-01-30 | End: 2019-02-04 | Stop reason: HOSPADM

## 2019-01-31 RX ORDER — CYCLOBENZAPRINE HCL 10 MG
10 TABLET ORAL 3 TIMES DAILY PRN
Status: DISCONTINUED | OUTPATIENT
Start: 2019-01-30 | End: 2019-02-04 | Stop reason: HOSPADM

## 2019-01-31 RX ORDER — LEVOFLOXACIN 5 MG/ML
500 INJECTION, SOLUTION INTRAVENOUS EVERY 24 HOURS
Status: DISCONTINUED | OUTPATIENT
Start: 2019-01-31 | End: 2019-02-04

## 2019-01-31 RX ORDER — LISINOPRIL 2.5 MG/1
2.5 TABLET ORAL DAILY
Status: DISCONTINUED | OUTPATIENT
Start: 2019-01-31 | End: 2019-02-04 | Stop reason: HOSPADM

## 2019-01-31 RX ORDER — DICYCLOMINE HYDROCHLORIDE 10 MG/1
10 CAPSULE ORAL
Status: DISCONTINUED | OUTPATIENT
Start: 2019-01-31 | End: 2019-02-04 | Stop reason: HOSPADM

## 2019-01-31 RX ORDER — IPRATROPIUM BROMIDE AND ALBUTEROL SULFATE 2.5; .5 MG/3ML; MG/3ML
1 SOLUTION RESPIRATORY (INHALATION) EVERY 4 HOURS
Status: DISCONTINUED | OUTPATIENT
Start: 2019-01-31 | End: 2019-02-04 | Stop reason: HOSPADM

## 2019-01-31 RX ORDER — FLUOXETINE HYDROCHLORIDE 20 MG/1
60 CAPSULE ORAL DAILY
Status: DISCONTINUED | OUTPATIENT
Start: 2019-01-31 | End: 2019-02-04 | Stop reason: HOSPADM

## 2019-01-31 RX ORDER — SODIUM CHLORIDE 9 MG/ML
INJECTION, SOLUTION INTRAVENOUS CONTINUOUS
Status: DISCONTINUED | OUTPATIENT
Start: 2019-01-31 | End: 2019-02-04

## 2019-01-31 RX ORDER — ROSUVASTATIN CALCIUM 20 MG/1
20 TABLET, COATED ORAL DAILY
Status: DISCONTINUED | OUTPATIENT
Start: 2019-01-31 | End: 2019-02-04 | Stop reason: HOSPADM

## 2019-01-31 RX ADMIN — GABAPENTIN 300 MG: 300 CAPSULE ORAL at 23:58

## 2019-01-31 RX ADMIN — ACETAMINOPHEN 650 MG: 325 TABLET ORAL at 08:31

## 2019-01-31 RX ADMIN — LISINOPRIL 2.5 MG: 2.5 TABLET ORAL at 08:31

## 2019-01-31 RX ADMIN — FLUOXETINE 60 MG: 20 CAPSULE ORAL at 08:32

## 2019-01-31 RX ADMIN — GUAIFENESIN AND DEXTROMETHORPHAN 5 ML: 100; 10 SYRUP ORAL at 21:34

## 2019-01-31 RX ADMIN — DOXYCYCLINE 100 MG: 100 INJECTION, POWDER, LYOPHILIZED, FOR SOLUTION INTRAVENOUS at 02:47

## 2019-01-31 RX ADMIN — SODIUM CHLORIDE: 9 INJECTION, SOLUTION INTRAVENOUS at 15:43

## 2019-01-31 RX ADMIN — BUPROPION HYDROCHLORIDE 100 MG: 100 TABLET, EXTENDED RELEASE ORAL at 08:31

## 2019-01-31 RX ADMIN — IPRATROPIUM BROMIDE 2 PUFF: 17 AEROSOL, METERED RESPIRATORY (INHALATION) at 03:02

## 2019-01-31 RX ADMIN — NORTRIPTYLINE HYDROCHLORIDE 10 MG: 10 CAPSULE ORAL at 21:34

## 2019-01-31 RX ADMIN — HYDROCHLOROTHIAZIDE 25 MG: 25 TABLET ORAL at 16:19

## 2019-01-31 RX ADMIN — GUAIFENESIN AND DEXTROMETHORPHAN 5 ML: 100; 10 SYRUP ORAL at 01:02

## 2019-01-31 RX ADMIN — BUPROPION HYDROCHLORIDE 200 MG: 100 TABLET, EXTENDED RELEASE ORAL at 01:10

## 2019-01-31 RX ADMIN — GABAPENTIN 300 MG: 300 CAPSULE ORAL at 10:51

## 2019-01-31 RX ADMIN — METOPROLOL TARTRATE 25 MG: 25 TABLET ORAL at 01:02

## 2019-01-31 RX ADMIN — LEVOFLOXACIN 500 MG: 5 INJECTION, SOLUTION INTRAVENOUS at 12:32

## 2019-01-31 RX ADMIN — TRAZODONE HYDROCHLORIDE 50 MG: 50 TABLET ORAL at 21:34

## 2019-01-31 RX ADMIN — GUAIFENESIN AND DEXTROMETHORPHAN 5 ML: 100; 10 SYRUP ORAL at 12:32

## 2019-01-31 RX ADMIN — GUAIFENESIN AND DEXTROMETHORPHAN 5 ML: 100; 10 SYRUP ORAL at 06:59

## 2019-01-31 RX ADMIN — CYCLOBENZAPRINE HYDROCHLORIDE 10 MG: 10 TABLET, FILM COATED ORAL at 08:31

## 2019-01-31 RX ADMIN — METOPROLOL TARTRATE 25 MG: 25 TABLET ORAL at 08:31

## 2019-01-31 RX ADMIN — DICYCLOMINE HYDROCHLORIDE 10 MG: 10 CAPSULE ORAL at 06:11

## 2019-01-31 RX ADMIN — IPRATROPIUM BROMIDE AND ALBUTEROL SULFATE 1 AMPULE: .5; 3 SOLUTION RESPIRATORY (INHALATION) at 19:50

## 2019-01-31 RX ADMIN — SPIRONOLACTONE 25 MG: 25 TABLET ORAL at 08:32

## 2019-01-31 RX ADMIN — ROSUVASTATIN CALCIUM 20 MG: 20 TABLET, FILM COATED ORAL at 08:31

## 2019-01-31 RX ADMIN — METFORMIN HYDROCHLORIDE 1000 MG: 1000 TABLET ORAL at 08:31

## 2019-01-31 RX ADMIN — IPRATROPIUM BROMIDE AND ALBUTEROL SULFATE 1 AMPULE: .5; 3 SOLUTION RESPIRATORY (INHALATION) at 13:23

## 2019-01-31 RX ADMIN — GABAPENTIN 300 MG: 300 CAPSULE ORAL at 19:23

## 2019-01-31 RX ADMIN — TRAZODONE HYDROCHLORIDE 50 MG: 50 TABLET ORAL at 01:01

## 2019-01-31 RX ADMIN — GLIPIZIDE 5 MG: 5 TABLET ORAL at 06:11

## 2019-01-31 RX ADMIN — SODIUM CHLORIDE: 9 INJECTION, SOLUTION INTRAVENOUS at 02:47

## 2019-01-31 RX ADMIN — IPRATROPIUM BROMIDE AND ALBUTEROL SULFATE 1 AMPULE: .5; 3 SOLUTION RESPIRATORY (INHALATION) at 17:21

## 2019-01-31 RX ADMIN — HYDROCHLOROTHIAZIDE 25 MG: 25 TABLET ORAL at 08:31

## 2019-01-31 RX ADMIN — BUPROPION HYDROCHLORIDE 200 MG: 100 TABLET, EXTENDED RELEASE ORAL at 21:34

## 2019-01-31 RX ADMIN — GABAPENTIN 300 MG: 300 CAPSULE ORAL at 01:02

## 2019-01-31 RX ADMIN — CYCLOBENZAPRINE HYDROCHLORIDE 10 MG: 10 TABLET, FILM COATED ORAL at 16:19

## 2019-01-31 RX ADMIN — ACETAMINOPHEN 650 MG: 325 TABLET ORAL at 14:53

## 2019-01-31 RX ADMIN — ACETAMINOPHEN 650 MG: 325 TABLET ORAL at 01:01

## 2019-01-31 RX ADMIN — GABAPENTIN 300 MG: 300 CAPSULE ORAL at 06:11

## 2019-01-31 RX ADMIN — CYCLOBENZAPRINE HYDROCHLORIDE 10 MG: 10 TABLET, FILM COATED ORAL at 01:02

## 2019-01-31 RX ADMIN — PANTOPRAZOLE SODIUM 40 MG: 40 TABLET, DELAYED RELEASE ORAL at 08:31

## 2019-01-31 RX ADMIN — METFORMIN HYDROCHLORIDE 1000 MG: 1000 TABLET ORAL at 16:19

## 2019-01-31 RX ADMIN — DICYCLOMINE HYDROCHLORIDE 10 MG: 10 CAPSULE ORAL at 15:42

## 2019-01-31 RX ADMIN — METOPROLOL TARTRATE 25 MG: 25 TABLET ORAL at 16:19

## 2019-01-31 RX ADMIN — GLIPIZIDE 5 MG: 5 TABLET ORAL at 15:42

## 2019-01-31 RX ADMIN — ONDANSETRON 4 MG: 2 INJECTION INTRAMUSCULAR; INTRAVENOUS at 14:56

## 2019-01-31 RX ADMIN — SPIRONOLACTONE 25 MG: 25 TABLET ORAL at 16:19

## 2019-01-31 RX ADMIN — GABAPENTIN 300 MG: 300 CAPSULE ORAL at 15:30

## 2019-01-31 RX ADMIN — PANTOPRAZOLE SODIUM 8 MG/HR: 40 INJECTION, POWDER, FOR SOLUTION INTRAVENOUS at 17:38

## 2019-01-31 ASSESSMENT — PAIN SCALES - GENERAL
PAINLEVEL_OUTOF10: 6
PAINLEVEL_OUTOF10: 7
PAINLEVEL_OUTOF10: 6
PAINLEVEL_OUTOF10: 7

## 2019-01-31 ASSESSMENT — PAIN DESCRIPTION - PROGRESSION: CLINICAL_PROGRESSION: NOT CHANGED

## 2019-01-31 ASSESSMENT — PAIN DESCRIPTION - PAIN TYPE: TYPE: CHRONIC PAIN

## 2019-01-31 ASSESSMENT — PAIN DESCRIPTION - DESCRIPTORS: DESCRIPTORS: CONSTANT;DISCOMFORT

## 2019-01-31 ASSESSMENT — PAIN DESCRIPTION - LOCATION: LOCATION: GENERALIZED

## 2019-01-31 ASSESSMENT — PAIN DESCRIPTION - ONSET: ONSET: ON-GOING

## 2019-01-31 ASSESSMENT — PAIN DESCRIPTION - FREQUENCY: FREQUENCY: CONTINUOUS

## 2019-01-31 ASSESSMENT — PAIN - FUNCTIONAL ASSESSMENT: PAIN_FUNCTIONAL_ASSESSMENT: ACTIVITIES ARE NOT PREVENTED

## 2019-02-01 PROBLEM — D45 POLYCYTHEMIA VERA (HCC): Status: ACTIVE | Noted: 2019-02-01

## 2019-02-01 PROBLEM — E11.9 TYPE 2 DIABETES MELLITUS WITHOUT COMPLICATION (HCC): Status: ACTIVE | Noted: 2019-02-01

## 2019-02-01 PROBLEM — Z87.11 H/O GASTRIC ULCER: Status: ACTIVE | Noted: 2019-02-01

## 2019-02-01 PROBLEM — J12.3 PNEUMONIA DUE TO HUMAN METAPNEUMOVIRUS: Status: ACTIVE | Noted: 2019-02-01

## 2019-02-01 PROBLEM — I10 ESSENTIAL HYPERTENSION: Status: ACTIVE | Noted: 2019-02-01

## 2019-02-01 PROBLEM — M79.7 FIBROMYALGIA: Status: ACTIVE | Noted: 2019-02-01

## 2019-02-01 PROBLEM — D50.9 IRON DEFICIENCY ANEMIA: Status: ACTIVE | Noted: 2019-02-01

## 2019-02-01 PROBLEM — F32.9 MAJOR DEPRESSIVE DISORDER: Status: ACTIVE | Noted: 2019-02-01

## 2019-02-01 PROBLEM — K21.9 GERD (GASTROESOPHAGEAL REFLUX DISEASE): Status: ACTIVE | Noted: 2019-02-01

## 2019-02-01 PROBLEM — E78.2 MIXED HYPERLIPIDEMIA: Status: ACTIVE | Noted: 2019-02-01

## 2019-02-01 LAB
ALBUMIN SERPL-MCNC: 3.6 G/DL (ref 3.5–5.2)
ALP BLD-CCNC: 91 U/L (ref 35–104)
ALT SERPL-CCNC: 17 U/L (ref 0–32)
ANION GAP SERPL CALCULATED.3IONS-SCNC: 13 MMOL/L (ref 7–16)
ANISOCYTOSIS: ABNORMAL
AST SERPL-CCNC: 25 U/L (ref 0–31)
ATYPICAL LYMPHOCYTE RELATIVE PERCENT: 1 % (ref 0–4)
BASOPHILS ABSOLUTE: 0 E9/L (ref 0–0.2)
BASOPHILS RELATIVE PERCENT: 0 % (ref 0–2)
BILIRUB SERPL-MCNC: 0.3 MG/DL (ref 0–1.2)
BUN BLDV-MCNC: 7 MG/DL (ref 8–23)
C DIFFICILE TOXIN, EIA: NORMAL
CALCIUM SERPL-MCNC: 8.9 MG/DL (ref 8.6–10.2)
CHLORIDE BLD-SCNC: 99 MMOL/L (ref 98–107)
CO2: 21 MMOL/L (ref 22–29)
CREAT SERPL-MCNC: 0.6 MG/DL (ref 0.5–1)
EOSINOPHILS ABSOLUTE: 0 E9/L (ref 0.05–0.5)
EOSINOPHILS RELATIVE PERCENT: 0 % (ref 0–6)
GFR AFRICAN AMERICAN: >60
GFR NON-AFRICAN AMERICAN: >60 ML/MIN/1.73
GLUCOSE BLD-MCNC: 222 MG/DL (ref 74–99)
HCT VFR BLD CALC: 32 % (ref 34–48)
HCT VFR BLD CALC: 34 % (ref 34–48)
HEMOGLOBIN: 9.1 G/DL (ref 11.5–15.5)
HEMOGLOBIN: 9.4 G/DL (ref 11.5–15.5)
HYPOCHROMIA: ABNORMAL
L. PNEUMOPHILA SEROGP 1 UR AG: NORMAL
LYMPHOCYTES ABSOLUTE: 0.71 E9/L (ref 1.5–4)
LYMPHOCYTES RELATIVE PERCENT: 7 % (ref 20–42)
MCH RBC QN AUTO: 20.7 PG (ref 26–35)
MCHC RBC AUTO-ENTMCNC: 28.4 % (ref 32–34.5)
MCV RBC AUTO: 72.9 FL (ref 80–99.9)
METER GLUCOSE: 110 MG/DL (ref 74–99)
METER GLUCOSE: 152 MG/DL (ref 74–99)
METER GLUCOSE: 218 MG/DL (ref 74–99)
METER GLUCOSE: 63 MG/DL (ref 74–99)
MONOCYTES ABSOLUTE: 0.71 E9/L (ref 0.1–0.95)
MONOCYTES RELATIVE PERCENT: 8 % (ref 2–12)
NEUTROPHILS ABSOLUTE: 7.48 E9/L (ref 1.8–7.3)
NEUTROPHILS RELATIVE PERCENT: 84 % (ref 43–80)
OCCULT BLOOD SCREENING: NORMAL
OVALOCYTES: ABNORMAL
PDW BLD-RTO: 24.1 FL (ref 11.5–15)
PLATELET # BLD: 289 E9/L (ref 130–450)
PMV BLD AUTO: 10.5 FL (ref 7–12)
POIKILOCYTES: ABNORMAL
POLYCHROMASIA: ABNORMAL
POTASSIUM SERPL-SCNC: 4 MMOL/L (ref 3.5–5)
RBC # BLD: 4.39 E12/L (ref 3.5–5.5)
ROTAVIRUS ANTIGEN: NORMAL
SCHISTOCYTES: ABNORMAL
SODIUM BLD-SCNC: 133 MMOL/L (ref 132–146)
STREP PNEUMONIAE ANTIGEN, URINE: NORMAL
TOTAL PROTEIN: 6.6 G/DL (ref 6.4–8.3)
WBC # BLD: 8.9 E9/L (ref 4.5–11.5)

## 2019-02-01 PROCEDURE — 85014 HEMATOCRIT: CPT

## 2019-02-01 PROCEDURE — 36415 COLL VENOUS BLD VENIPUNCTURE: CPT

## 2019-02-01 PROCEDURE — 6360000002 HC RX W HCPCS: Performed by: CLINICAL NURSE SPECIALIST

## 2019-02-01 PROCEDURE — 6360000002 HC RX W HCPCS: Performed by: INTERNAL MEDICINE

## 2019-02-01 PROCEDURE — 94640 AIRWAY INHALATION TREATMENT: CPT

## 2019-02-01 PROCEDURE — 6360000002 HC RX W HCPCS: Performed by: NEUROMUSCULOSKELETAL MEDICINE & OMM

## 2019-02-01 PROCEDURE — 6370000000 HC RX 637 (ALT 250 FOR IP): Performed by: NEUROMUSCULOSKELETAL MEDICINE & OMM

## 2019-02-01 PROCEDURE — 6370000000 HC RX 637 (ALT 250 FOR IP): Performed by: INTERNAL MEDICINE

## 2019-02-01 PROCEDURE — 85025 COMPLETE CBC W/AUTO DIFF WBC: CPT

## 2019-02-01 PROCEDURE — 1200000000 HC SEMI PRIVATE

## 2019-02-01 PROCEDURE — 82962 GLUCOSE BLOOD TEST: CPT

## 2019-02-01 PROCEDURE — 97161 PT EVAL LOW COMPLEX 20 MIN: CPT

## 2019-02-01 PROCEDURE — 97165 OT EVAL LOW COMPLEX 30 MIN: CPT

## 2019-02-01 PROCEDURE — C9113 INJ PANTOPRAZOLE SODIUM, VIA: HCPCS | Performed by: CLINICAL NURSE SPECIALIST

## 2019-02-01 PROCEDURE — 2580000003 HC RX 258: Performed by: CLINICAL NURSE SPECIALIST

## 2019-02-01 PROCEDURE — 2580000003 HC RX 258: Performed by: NEUROMUSCULOSKELETAL MEDICINE & OMM

## 2019-02-01 PROCEDURE — 85018 HEMOGLOBIN: CPT

## 2019-02-01 PROCEDURE — 80053 COMPREHEN METABOLIC PANEL: CPT

## 2019-02-01 RX ORDER — NICOTINE POLACRILEX 4 MG
15 LOZENGE BUCCAL PRN
Status: DISCONTINUED | OUTPATIENT
Start: 2019-02-01 | End: 2019-02-04 | Stop reason: HOSPADM

## 2019-02-01 RX ORDER — DEXTROSE MONOHYDRATE 50 MG/ML
100 INJECTION, SOLUTION INTRAVENOUS PRN
Status: DISCONTINUED | OUTPATIENT
Start: 2019-02-01 | End: 2019-02-04 | Stop reason: HOSPADM

## 2019-02-01 RX ORDER — DEXTROSE MONOHYDRATE 25 G/50ML
12.5 INJECTION, SOLUTION INTRAVENOUS PRN
Status: DISCONTINUED | OUTPATIENT
Start: 2019-02-01 | End: 2019-02-04 | Stop reason: HOSPADM

## 2019-02-01 RX ADMIN — PANTOPRAZOLE SODIUM 8 MG/HR: 40 INJECTION, POWDER, FOR SOLUTION INTRAVENOUS at 02:54

## 2019-02-01 RX ADMIN — METOPROLOL TARTRATE 25 MG: 25 TABLET ORAL at 15:02

## 2019-02-01 RX ADMIN — ACETAMINOPHEN 650 MG: 325 TABLET ORAL at 11:23

## 2019-02-01 RX ADMIN — SODIUM CHLORIDE: 9 INJECTION, SOLUTION INTRAVENOUS at 07:10

## 2019-02-01 RX ADMIN — METFORMIN HYDROCHLORIDE 1000 MG: 1000 TABLET ORAL at 10:00

## 2019-02-01 RX ADMIN — LEVOFLOXACIN 500 MG: 5 INJECTION, SOLUTION INTRAVENOUS at 11:24

## 2019-02-01 RX ADMIN — IPRATROPIUM BROMIDE AND ALBUTEROL SULFATE 1 AMPULE: .5; 3 SOLUTION RESPIRATORY (INHALATION) at 20:35

## 2019-02-01 RX ADMIN — GABAPENTIN 300 MG: 300 CAPSULE ORAL at 22:33

## 2019-02-01 RX ADMIN — BUPROPION HYDROCHLORIDE 200 MG: 100 TABLET, EXTENDED RELEASE ORAL at 22:33

## 2019-02-01 RX ADMIN — DICYCLOMINE HYDROCHLORIDE 10 MG: 10 CAPSULE ORAL at 17:34

## 2019-02-01 RX ADMIN — METFORMIN HYDROCHLORIDE 1000 MG: 1000 TABLET ORAL at 17:34

## 2019-02-01 RX ADMIN — HYDROCHLOROTHIAZIDE 25 MG: 25 TABLET ORAL at 09:59

## 2019-02-01 RX ADMIN — LISINOPRIL 2.5 MG: 2.5 TABLET ORAL at 10:00

## 2019-02-01 RX ADMIN — GLIPIZIDE 5 MG: 5 TABLET ORAL at 17:34

## 2019-02-01 RX ADMIN — GABAPENTIN 300 MG: 300 CAPSULE ORAL at 18:40

## 2019-02-01 RX ADMIN — ROSUVASTATIN CALCIUM 20 MG: 20 TABLET, FILM COATED ORAL at 10:00

## 2019-02-01 RX ADMIN — PANTOPRAZOLE SODIUM 8 MG/HR: 40 INJECTION, POWDER, FOR SOLUTION INTRAVENOUS at 22:31

## 2019-02-01 RX ADMIN — PANTOPRAZOLE SODIUM 8 MG/HR: 40 INJECTION, POWDER, FOR SOLUTION INTRAVENOUS at 12:49

## 2019-02-01 RX ADMIN — DICYCLOMINE HYDROCHLORIDE 10 MG: 10 CAPSULE ORAL at 07:10

## 2019-02-01 RX ADMIN — HYDROCHLOROTHIAZIDE 25 MG: 25 TABLET ORAL at 17:34

## 2019-02-01 RX ADMIN — CYCLOBENZAPRINE HYDROCHLORIDE 10 MG: 10 TABLET, FILM COATED ORAL at 18:40

## 2019-02-01 RX ADMIN — BUPROPION HYDROCHLORIDE 100 MG: 100 TABLET, EXTENDED RELEASE ORAL at 09:59

## 2019-02-01 RX ADMIN — METOPROLOL TARTRATE 25 MG: 25 TABLET ORAL at 07:10

## 2019-02-01 RX ADMIN — SPIRONOLACTONE 25 MG: 25 TABLET ORAL at 10:00

## 2019-02-01 RX ADMIN — GUAIFENESIN AND DEXTROMETHORPHAN 5 ML: 100; 10 SYRUP ORAL at 07:11

## 2019-02-01 RX ADMIN — GUAIFENESIN AND DEXTROMETHORPHAN 5 ML: 100; 10 SYRUP ORAL at 22:32

## 2019-02-01 RX ADMIN — IPRATROPIUM BROMIDE AND ALBUTEROL SULFATE 1 AMPULE: .5; 3 SOLUTION RESPIRATORY (INHALATION) at 12:19

## 2019-02-01 RX ADMIN — FLUOXETINE 60 MG: 20 CAPSULE ORAL at 10:00

## 2019-02-01 RX ADMIN — GABAPENTIN 300 MG: 300 CAPSULE ORAL at 07:10

## 2019-02-01 RX ADMIN — ONDANSETRON 4 MG: 2 INJECTION INTRAMUSCULAR; INTRAVENOUS at 10:11

## 2019-02-01 RX ADMIN — GABAPENTIN 300 MG: 300 CAPSULE ORAL at 15:02

## 2019-02-01 RX ADMIN — ENOXAPARIN SODIUM 40 MG: 40 INJECTION SUBCUTANEOUS at 10:03

## 2019-02-01 RX ADMIN — NORTRIPTYLINE HYDROCHLORIDE 10 MG: 10 CAPSULE ORAL at 22:33

## 2019-02-01 RX ADMIN — GABAPENTIN 300 MG: 300 CAPSULE ORAL at 11:24

## 2019-02-01 RX ADMIN — GLIPIZIDE 5 MG: 5 TABLET ORAL at 07:10

## 2019-02-01 RX ADMIN — TRAZODONE HYDROCHLORIDE 50 MG: 50 TABLET ORAL at 22:33

## 2019-02-01 RX ADMIN — SODIUM CHLORIDE: 9 INJECTION, SOLUTION INTRAVENOUS at 22:31

## 2019-02-01 RX ADMIN — ACETAMINOPHEN 650 MG: 325 TABLET ORAL at 17:35

## 2019-02-01 RX ADMIN — IPRATROPIUM BROMIDE AND ALBUTEROL SULFATE 1 AMPULE: .5; 3 SOLUTION RESPIRATORY (INHALATION) at 09:36

## 2019-02-01 RX ADMIN — SPIRONOLACTONE 25 MG: 25 TABLET ORAL at 17:35

## 2019-02-01 RX ADMIN — CYCLOBENZAPRINE HYDROCHLORIDE 10 MG: 10 TABLET, FILM COATED ORAL at 09:59

## 2019-02-01 ASSESSMENT — PAIN SCALES - GENERAL
PAINLEVEL_OUTOF10: 0
PAINLEVEL_OUTOF10: 4
PAINLEVEL_OUTOF10: 4
PAINLEVEL_OUTOF10: 6
PAINLEVEL_OUTOF10: 0
PAINLEVEL_OUTOF10: 0

## 2019-02-01 ASSESSMENT — PAIN DESCRIPTION - PAIN TYPE
TYPE: CHRONIC PAIN
TYPE: CHRONIC PAIN

## 2019-02-01 ASSESSMENT — PAIN DESCRIPTION - DESCRIPTORS
DESCRIPTORS: ACHING;DISCOMFORT
DESCRIPTORS: ACHING;DISCOMFORT

## 2019-02-01 ASSESSMENT — PAIN DESCRIPTION - ONSET
ONSET: ON-GOING
ONSET: ON-GOING

## 2019-02-01 ASSESSMENT — PAIN DESCRIPTION - LOCATION
LOCATION: GENERALIZED
LOCATION: GENERALIZED

## 2019-02-01 ASSESSMENT — PAIN DESCRIPTION - PROGRESSION
CLINICAL_PROGRESSION: NOT CHANGED
CLINICAL_PROGRESSION: NOT CHANGED

## 2019-02-01 ASSESSMENT — PAIN DESCRIPTION - FREQUENCY
FREQUENCY: CONTINUOUS
FREQUENCY: CONTINUOUS

## 2019-02-02 PROBLEM — J44.1 COPD EXACERBATION (HCC): Status: ACTIVE | Noted: 2019-02-02

## 2019-02-02 LAB
ALBUMIN SERPL-MCNC: 3.9 G/DL (ref 3.5–5.2)
ALP BLD-CCNC: 95 U/L (ref 35–104)
ALT SERPL-CCNC: 19 U/L (ref 0–32)
ANION GAP SERPL CALCULATED.3IONS-SCNC: 14 MMOL/L (ref 7–16)
ANISOCYTOSIS: ABNORMAL
AST SERPL-CCNC: 29 U/L (ref 0–31)
BASOPHILS ABSOLUTE: 0.08 E9/L (ref 0–0.2)
BASOPHILS RELATIVE PERCENT: 0.9 % (ref 0–2)
BILIRUB SERPL-MCNC: 0.3 MG/DL (ref 0–1.2)
BUN BLDV-MCNC: 6 MG/DL (ref 8–23)
BURR CELLS: ABNORMAL
CALCIUM SERPL-MCNC: 9.6 MG/DL (ref 8.6–10.2)
CHLORIDE BLD-SCNC: 101 MMOL/L (ref 98–107)
CO2: 23 MMOL/L (ref 22–29)
CREAT SERPL-MCNC: 0.5 MG/DL (ref 0.5–1)
CULTURE, RESPIRATORY: NORMAL
EOSINOPHILS ABSOLUTE: 0 E9/L (ref 0.05–0.5)
EOSINOPHILS RELATIVE PERCENT: 0 % (ref 0–6)
ERYTHROPOIETIN: 160 MU/ML (ref 4–27)
GFR AFRICAN AMERICAN: >60
GFR NON-AFRICAN AMERICAN: >60 ML/MIN/1.73
GLUCOSE BLD-MCNC: 99 MG/DL (ref 74–99)
HCT VFR BLD CALC: 34.2 % (ref 34–48)
HCT VFR BLD CALC: 35.2 % (ref 34–48)
HEMOGLOBIN: 9.8 G/DL (ref 11.5–15.5)
HEMOGLOBIN: 9.8 G/DL (ref 11.5–15.5)
HYPOCHROMIA: ABNORMAL
LYMPHOCYTES ABSOLUTE: 1.97 E9/L (ref 1.5–4)
LYMPHOCYTES RELATIVE PERCENT: 20.9 % (ref 20–42)
MCH RBC QN AUTO: 20.2 PG (ref 26–35)
MCHC RBC AUTO-ENTMCNC: 27.8 % (ref 32–34.5)
MCV RBC AUTO: 72.7 FL (ref 80–99.9)
METER GLUCOSE: 107 MG/DL (ref 74–99)
METER GLUCOSE: 135 MG/DL (ref 74–99)
METER GLUCOSE: 233 MG/DL (ref 74–99)
METER GLUCOSE: 80 MG/DL (ref 74–99)
METER GLUCOSE: 87 MG/DL (ref 74–99)
MONOCYTES ABSOLUTE: 0.66 E9/L (ref 0.1–0.95)
MONOCYTES RELATIVE PERCENT: 6.9 % (ref 2–12)
NEUTROPHILS ABSOLUTE: 6.67 E9/L (ref 1.8–7.3)
NEUTROPHILS RELATIVE PERCENT: 71.3 % (ref 43–80)
NUCLEATED RED BLOOD CELLS: 0.9 /100 WBC
OVALOCYTES: ABNORMAL
PDW BLD-RTO: 24.8 FL (ref 11.5–15)
PLATELET # BLD: 320 E9/L (ref 130–450)
PMV BLD AUTO: 10.9 FL (ref 7–12)
POIKILOCYTES: ABNORMAL
POLYCHROMASIA: ABNORMAL
POTASSIUM SERPL-SCNC: 3.7 MMOL/L (ref 3.5–5)
RBC # BLD: 4.84 E12/L (ref 3.5–5.5)
SMEAR, RESPIRATORY: NORMAL
SODIUM BLD-SCNC: 138 MMOL/L (ref 132–146)
TOTAL PROTEIN: 7.2 G/DL (ref 6.4–8.3)
WBC # BLD: 9.4 E9/L (ref 4.5–11.5)

## 2019-02-02 PROCEDURE — 82962 GLUCOSE BLOOD TEST: CPT

## 2019-02-02 PROCEDURE — 1200000000 HC SEMI PRIVATE

## 2019-02-02 PROCEDURE — 80053 COMPREHEN METABOLIC PANEL: CPT

## 2019-02-02 PROCEDURE — 85014 HEMATOCRIT: CPT

## 2019-02-02 PROCEDURE — 36415 COLL VENOUS BLD VENIPUNCTURE: CPT

## 2019-02-02 PROCEDURE — 6370000000 HC RX 637 (ALT 250 FOR IP): Performed by: INTERNAL MEDICINE

## 2019-02-02 PROCEDURE — C9113 INJ PANTOPRAZOLE SODIUM, VIA: HCPCS | Performed by: CLINICAL NURSE SPECIALIST

## 2019-02-02 PROCEDURE — 94667 MNPJ CHEST WALL 1ST: CPT

## 2019-02-02 PROCEDURE — 6360000002 HC RX W HCPCS: Performed by: NEUROMUSCULOSKELETAL MEDICINE & OMM

## 2019-02-02 PROCEDURE — 6360000002 HC RX W HCPCS: Performed by: CLINICAL NURSE SPECIALIST

## 2019-02-02 PROCEDURE — 85018 HEMOGLOBIN: CPT

## 2019-02-02 PROCEDURE — 6370000000 HC RX 637 (ALT 250 FOR IP): Performed by: NEUROMUSCULOSKELETAL MEDICINE & OMM

## 2019-02-02 PROCEDURE — 2580000003 HC RX 258: Performed by: NEUROMUSCULOSKELETAL MEDICINE & OMM

## 2019-02-02 PROCEDURE — 6360000002 HC RX W HCPCS: Performed by: INTERNAL MEDICINE

## 2019-02-02 PROCEDURE — 2580000003 HC RX 258: Performed by: CLINICAL NURSE SPECIALIST

## 2019-02-02 PROCEDURE — 94640 AIRWAY INHALATION TREATMENT: CPT

## 2019-02-02 PROCEDURE — 94150 VITAL CAPACITY TEST: CPT

## 2019-02-02 PROCEDURE — 85025 COMPLETE CBC W/AUTO DIFF WBC: CPT

## 2019-02-02 RX ORDER — 0.9 % SODIUM CHLORIDE 0.9 %
10 VIAL (ML) INJECTION 2 TIMES DAILY
Status: DISCONTINUED | OUTPATIENT
Start: 2019-02-02 | End: 2019-02-04 | Stop reason: HOSPADM

## 2019-02-02 RX ORDER — PANTOPRAZOLE SODIUM 40 MG/10ML
40 INJECTION, POWDER, LYOPHILIZED, FOR SOLUTION INTRAVENOUS 2 TIMES DAILY
Status: DISCONTINUED | OUTPATIENT
Start: 2019-02-02 | End: 2019-02-04 | Stop reason: HOSPADM

## 2019-02-02 RX ADMIN — CYCLOBENZAPRINE HYDROCHLORIDE 10 MG: 10 TABLET, FILM COATED ORAL at 06:41

## 2019-02-02 RX ADMIN — GUAIFENESIN AND DEXTROMETHORPHAN 5 ML: 100; 10 SYRUP ORAL at 14:35

## 2019-02-02 RX ADMIN — SPIRONOLACTONE 25 MG: 25 TABLET ORAL at 16:25

## 2019-02-02 RX ADMIN — PANTOPRAZOLE SODIUM 40 MG: 40 INJECTION, POWDER, FOR SOLUTION INTRAVENOUS at 10:50

## 2019-02-02 RX ADMIN — LEVOFLOXACIN 500 MG: 5 INJECTION, SOLUTION INTRAVENOUS at 11:32

## 2019-02-02 RX ADMIN — TRAZODONE HYDROCHLORIDE 50 MG: 50 TABLET ORAL at 19:44

## 2019-02-02 RX ADMIN — CYCLOBENZAPRINE HYDROCHLORIDE 10 MG: 10 TABLET, FILM COATED ORAL at 16:27

## 2019-02-02 RX ADMIN — LISINOPRIL 2.5 MG: 2.5 TABLET ORAL at 08:23

## 2019-02-02 RX ADMIN — ACETAMINOPHEN 650 MG: 325 TABLET ORAL at 19:44

## 2019-02-02 RX ADMIN — PANTOPRAZOLE SODIUM 40 MG: 40 INJECTION, POWDER, FOR SOLUTION INTRAVENOUS at 19:44

## 2019-02-02 RX ADMIN — ROSUVASTATIN CALCIUM 20 MG: 20 TABLET, FILM COATED ORAL at 08:23

## 2019-02-02 RX ADMIN — HYDROCHLOROTHIAZIDE 25 MG: 25 TABLET ORAL at 16:25

## 2019-02-02 RX ADMIN — BUPROPION HYDROCHLORIDE 200 MG: 100 TABLET, EXTENDED RELEASE ORAL at 19:43

## 2019-02-02 RX ADMIN — Medication 10 ML: at 10:51

## 2019-02-02 RX ADMIN — GABAPENTIN 300 MG: 300 CAPSULE ORAL at 06:41

## 2019-02-02 RX ADMIN — SODIUM CHLORIDE: 9 INJECTION, SOLUTION INTRAVENOUS at 10:52

## 2019-02-02 RX ADMIN — DICYCLOMINE HYDROCHLORIDE 10 MG: 10 CAPSULE ORAL at 06:41

## 2019-02-02 RX ADMIN — ENOXAPARIN SODIUM 40 MG: 40 INJECTION SUBCUTANEOUS at 08:23

## 2019-02-02 RX ADMIN — IPRATROPIUM BROMIDE AND ALBUTEROL SULFATE 1 AMPULE: .5; 3 SOLUTION RESPIRATORY (INHALATION) at 13:05

## 2019-02-02 RX ADMIN — GLIPIZIDE 5 MG: 5 TABLET ORAL at 16:25

## 2019-02-02 RX ADMIN — HYDROCHLOROTHIAZIDE 25 MG: 25 TABLET ORAL at 08:23

## 2019-02-02 RX ADMIN — SALINE NASAL SPRAY 1 SPRAY: 1.5 SOLUTION NASAL at 16:27

## 2019-02-02 RX ADMIN — NORTRIPTYLINE HYDROCHLORIDE 10 MG: 10 CAPSULE ORAL at 19:44

## 2019-02-02 RX ADMIN — GLIPIZIDE 5 MG: 5 TABLET ORAL at 06:41

## 2019-02-02 RX ADMIN — CYCLOBENZAPRINE HYDROCHLORIDE 10 MG: 10 TABLET, FILM COATED ORAL at 22:33

## 2019-02-02 RX ADMIN — SALINE NASAL SPRAY 1 SPRAY: 1.5 SOLUTION NASAL at 11:34

## 2019-02-02 RX ADMIN — IPRATROPIUM BROMIDE AND ALBUTEROL SULFATE 1 AMPULE: .5; 3 SOLUTION RESPIRATORY (INHALATION) at 09:02

## 2019-02-02 RX ADMIN — BUPROPION HYDROCHLORIDE 100 MG: 100 TABLET, EXTENDED RELEASE ORAL at 08:23

## 2019-02-02 RX ADMIN — DICYCLOMINE HYDROCHLORIDE 10 MG: 10 CAPSULE ORAL at 16:25

## 2019-02-02 RX ADMIN — GUAIFENESIN AND DEXTROMETHORPHAN 5 ML: 100; 10 SYRUP ORAL at 06:41

## 2019-02-02 RX ADMIN — IPRATROPIUM BROMIDE AND ALBUTEROL SULFATE 1 AMPULE: .5; 3 SOLUTION RESPIRATORY (INHALATION) at 20:15

## 2019-02-02 RX ADMIN — METOPROLOL TARTRATE 25 MG: 25 TABLET ORAL at 22:30

## 2019-02-02 RX ADMIN — GABAPENTIN 300 MG: 300 CAPSULE ORAL at 10:50

## 2019-02-02 RX ADMIN — GABAPENTIN 300 MG: 300 CAPSULE ORAL at 14:29

## 2019-02-02 RX ADMIN — GABAPENTIN 300 MG: 300 CAPSULE ORAL at 22:30

## 2019-02-02 RX ADMIN — METOPROLOL TARTRATE 25 MG: 25 TABLET ORAL at 14:29

## 2019-02-02 RX ADMIN — Medication 10 ML: at 19:44

## 2019-02-02 RX ADMIN — FLUOXETINE 60 MG: 20 CAPSULE ORAL at 08:23

## 2019-02-02 RX ADMIN — IPRATROPIUM BROMIDE AND ALBUTEROL SULFATE 1 AMPULE: .5; 3 SOLUTION RESPIRATORY (INHALATION) at 16:20

## 2019-02-02 RX ADMIN — METFORMIN HYDROCHLORIDE 1000 MG: 1000 TABLET ORAL at 08:23

## 2019-02-02 RX ADMIN — SPIRONOLACTONE 25 MG: 25 TABLET ORAL at 08:23

## 2019-02-02 RX ADMIN — METFORMIN HYDROCHLORIDE 1000 MG: 1000 TABLET ORAL at 16:25

## 2019-02-02 RX ADMIN — ACETAMINOPHEN 650 MG: 325 TABLET ORAL at 06:41

## 2019-02-02 RX ADMIN — ACETAMINOPHEN 650 MG: 325 TABLET ORAL at 00:27

## 2019-02-02 RX ADMIN — GABAPENTIN 300 MG: 300 CAPSULE ORAL at 18:55

## 2019-02-02 ASSESSMENT — PAIN SCALES - GENERAL
PAINLEVEL_OUTOF10: 6
PAINLEVEL_OUTOF10: 0
PAINLEVEL_OUTOF10: 6
PAINLEVEL_OUTOF10: 3
PAINLEVEL_OUTOF10: 4

## 2019-02-02 ASSESSMENT — PAIN - FUNCTIONAL ASSESSMENT: PAIN_FUNCTIONAL_ASSESSMENT: ACTIVITIES ARE NOT PREVENTED

## 2019-02-02 ASSESSMENT — PAIN DESCRIPTION - DESCRIPTORS: DESCRIPTORS: ACHING;DISCOMFORT

## 2019-02-02 ASSESSMENT — PAIN DESCRIPTION - ORIENTATION: ORIENTATION: RIGHT;LEFT;MID

## 2019-02-02 ASSESSMENT — PAIN DESCRIPTION - PAIN TYPE: TYPE: CHRONIC PAIN

## 2019-02-02 ASSESSMENT — PAIN DESCRIPTION - PROGRESSION: CLINICAL_PROGRESSION: NOT CHANGED

## 2019-02-02 ASSESSMENT — PAIN DESCRIPTION - ONSET: ONSET: ON-GOING

## 2019-02-02 ASSESSMENT — PAIN DESCRIPTION - FREQUENCY: FREQUENCY: CONTINUOUS

## 2019-02-02 ASSESSMENT — PAIN DESCRIPTION - LOCATION: LOCATION: HEAD;NECK

## 2019-02-03 ENCOUNTER — ANESTHESIA (OUTPATIENT)
Dept: ENDOSCOPY | Age: 64
DRG: 194 | End: 2019-02-03
Payer: MEDICARE

## 2019-02-03 ENCOUNTER — ANESTHESIA EVENT (OUTPATIENT)
Dept: ENDOSCOPY | Age: 64
DRG: 194 | End: 2019-02-03
Payer: MEDICARE

## 2019-02-03 LAB
ALBUMIN SERPL-MCNC: 3.7 G/DL (ref 3.5–5.2)
ALP BLD-CCNC: 88 U/L (ref 35–104)
ALT SERPL-CCNC: 18 U/L (ref 0–32)
ANION GAP SERPL CALCULATED.3IONS-SCNC: 13 MMOL/L (ref 7–16)
ANISOCYTOSIS: ABNORMAL
AST SERPL-CCNC: 30 U/L (ref 0–31)
BASOPHILS ABSOLUTE: 0.07 E9/L (ref 0–0.2)
BASOPHILS RELATIVE PERCENT: 0.9 % (ref 0–2)
BILIRUB SERPL-MCNC: 0.3 MG/DL (ref 0–1.2)
BLASTS RELATIVE PERCENT: 0.9 % (ref 0–0)
BUN BLDV-MCNC: 6 MG/DL (ref 8–23)
CALCIUM SERPL-MCNC: 9.4 MG/DL (ref 8.6–10.2)
CHLORIDE BLD-SCNC: 100 MMOL/L (ref 98–107)
CO2: 23 MMOL/L (ref 22–29)
CREAT SERPL-MCNC: 0.5 MG/DL (ref 0.5–1)
CULTURE, STOOL: NORMAL
EOSINOPHILS ABSOLUTE: 0.29 E9/L (ref 0.05–0.5)
EOSINOPHILS RELATIVE PERCENT: 3.5 % (ref 0–6)
GFR AFRICAN AMERICAN: >60
GFR NON-AFRICAN AMERICAN: >60 ML/MIN/1.73
GLUCOSE BLD-MCNC: 131 MG/DL (ref 74–99)
HCT VFR BLD CALC: 33.9 % (ref 34–48)
HCT VFR BLD CALC: 37 % (ref 34–48)
HEMOGLOBIN: 10.4 G/DL (ref 11.5–15.5)
HEMOGLOBIN: 9.3 G/DL (ref 11.5–15.5)
HYPOCHROMIA: ABNORMAL
LYMPHOCYTES ABSOLUTE: 1.48 E9/L (ref 1.5–4)
LYMPHOCYTES RELATIVE PERCENT: 18.2 % (ref 20–42)
MCH RBC QN AUTO: 20 PG (ref 26–35)
MCHC RBC AUTO-ENTMCNC: 27.4 % (ref 32–34.5)
MCV RBC AUTO: 72.9 FL (ref 80–99.9)
METER GLUCOSE: 106 MG/DL (ref 74–99)
METER GLUCOSE: 132 MG/DL (ref 74–99)
METER GLUCOSE: 157 MG/DL (ref 74–99)
METER GLUCOSE: 75 MG/DL (ref 74–99)
MONOCYTES ABSOLUTE: 0.57 E9/L (ref 0.1–0.95)
MONOCYTES RELATIVE PERCENT: 6.9 % (ref 2–12)
NEUTROPHILS ABSOLUTE: 5.74 E9/L (ref 1.8–7.3)
NEUTROPHILS RELATIVE PERCENT: 69.6 % (ref 43–80)
NUCLEATED RED BLOOD CELLS: 0.9 /100 WBC
OVALOCYTES: ABNORMAL
PDW BLD-RTO: 24.8 FL (ref 11.5–15)
PLATELET # BLD: 304 E9/L (ref 130–450)
PMV BLD AUTO: 9.9 FL (ref 7–12)
POIKILOCYTES: ABNORMAL
POLYCHROMASIA: ABNORMAL
POTASSIUM SERPL-SCNC: 3.3 MMOL/L (ref 3.5–5)
RBC # BLD: 4.65 E12/L (ref 3.5–5.5)
SODIUM BLD-SCNC: 136 MMOL/L (ref 132–146)
TOTAL PROTEIN: 6.9 G/DL (ref 6.4–8.3)
WBC # BLD: 8.2 E9/L (ref 4.5–11.5)

## 2019-02-03 PROCEDURE — 2580000003 HC RX 258: Performed by: NEUROMUSCULOSKELETAL MEDICINE & OMM

## 2019-02-03 PROCEDURE — 6360000002 HC RX W HCPCS: Performed by: CLINICAL NURSE SPECIALIST

## 2019-02-03 PROCEDURE — 6360000002 HC RX W HCPCS: Performed by: NEUROMUSCULOSKELETAL MEDICINE & OMM

## 2019-02-03 PROCEDURE — 94640 AIRWAY INHALATION TREATMENT: CPT

## 2019-02-03 PROCEDURE — 6370000000 HC RX 637 (ALT 250 FOR IP): Performed by: CLINICAL NURSE SPECIALIST

## 2019-02-03 PROCEDURE — 6370000000 HC RX 637 (ALT 250 FOR IP): Performed by: NEUROMUSCULOSKELETAL MEDICINE & OMM

## 2019-02-03 PROCEDURE — 36415 COLL VENOUS BLD VENIPUNCTURE: CPT

## 2019-02-03 PROCEDURE — 1200000000 HC SEMI PRIVATE

## 2019-02-03 PROCEDURE — C9113 INJ PANTOPRAZOLE SODIUM, VIA: HCPCS | Performed by: CLINICAL NURSE SPECIALIST

## 2019-02-03 PROCEDURE — 85018 HEMOGLOBIN: CPT

## 2019-02-03 PROCEDURE — 80053 COMPREHEN METABOLIC PANEL: CPT

## 2019-02-03 PROCEDURE — 94150 VITAL CAPACITY TEST: CPT

## 2019-02-03 PROCEDURE — 6370000000 HC RX 637 (ALT 250 FOR IP): Performed by: INTERNAL MEDICINE

## 2019-02-03 PROCEDURE — 2580000003 HC RX 258: Performed by: CLINICAL NURSE SPECIALIST

## 2019-02-03 PROCEDURE — 85014 HEMATOCRIT: CPT

## 2019-02-03 PROCEDURE — 6360000002 HC RX W HCPCS: Performed by: INTERNAL MEDICINE

## 2019-02-03 PROCEDURE — 82962 GLUCOSE BLOOD TEST: CPT

## 2019-02-03 PROCEDURE — 85025 COMPLETE CBC W/AUTO DIFF WBC: CPT

## 2019-02-03 RX ADMIN — GABAPENTIN 300 MG: 300 CAPSULE ORAL at 11:23

## 2019-02-03 RX ADMIN — Medication 10 ML: at 22:06

## 2019-02-03 RX ADMIN — ACETAMINOPHEN 650 MG: 325 TABLET ORAL at 22:05

## 2019-02-03 RX ADMIN — GABAPENTIN 300 MG: 300 CAPSULE ORAL at 14:25

## 2019-02-03 RX ADMIN — GUAIFENESIN AND DEXTROMETHORPHAN 5 ML: 100; 10 SYRUP ORAL at 04:09

## 2019-02-03 RX ADMIN — PANTOPRAZOLE SODIUM 40 MG: 40 INJECTION, POWDER, FOR SOLUTION INTRAVENOUS at 09:50

## 2019-02-03 RX ADMIN — SALINE NASAL SPRAY 1 SPRAY: 1.5 SOLUTION NASAL at 17:01

## 2019-02-03 RX ADMIN — FLUOXETINE 60 MG: 20 CAPSULE ORAL at 09:51

## 2019-02-03 RX ADMIN — GUAIFENESIN AND DEXTROMETHORPHAN 5 ML: 100; 10 SYRUP ORAL at 11:23

## 2019-02-03 RX ADMIN — IPRATROPIUM BROMIDE AND ALBUTEROL SULFATE 1 AMPULE: .5; 3 SOLUTION RESPIRATORY (INHALATION) at 08:56

## 2019-02-03 RX ADMIN — SODIUM CHLORIDE: 9 INJECTION, SOLUTION INTRAVENOUS at 04:02

## 2019-02-03 RX ADMIN — BUPROPION HYDROCHLORIDE 100 MG: 100 TABLET, EXTENDED RELEASE ORAL at 09:51

## 2019-02-03 RX ADMIN — PANTOPRAZOLE SODIUM 40 MG: 40 INJECTION, POWDER, FOR SOLUTION INTRAVENOUS at 22:06

## 2019-02-03 RX ADMIN — GABAPENTIN 300 MG: 300 CAPSULE ORAL at 22:05

## 2019-02-03 RX ADMIN — METOPROLOL TARTRATE 25 MG: 25 TABLET ORAL at 05:58

## 2019-02-03 RX ADMIN — MAGNESIUM CITRATE 300 ML: 1.75 LIQUID ORAL at 14:30

## 2019-02-03 RX ADMIN — ROSUVASTATIN CALCIUM 20 MG: 20 TABLET, FILM COATED ORAL at 09:51

## 2019-02-03 RX ADMIN — GLIPIZIDE 5 MG: 5 TABLET ORAL at 06:32

## 2019-02-03 RX ADMIN — GUAIFENESIN AND DEXTROMETHORPHAN 5 ML: 100; 10 SYRUP ORAL at 22:06

## 2019-02-03 RX ADMIN — BUPROPION HYDROCHLORIDE 200 MG: 100 TABLET, EXTENDED RELEASE ORAL at 22:06

## 2019-02-03 RX ADMIN — HYDROCHLOROTHIAZIDE 25 MG: 25 TABLET ORAL at 09:51

## 2019-02-03 RX ADMIN — SPIRONOLACTONE 25 MG: 25 TABLET ORAL at 16:59

## 2019-02-03 RX ADMIN — LEVOFLOXACIN 500 MG: 5 INJECTION, SOLUTION INTRAVENOUS at 11:23

## 2019-02-03 RX ADMIN — TRAZODONE HYDROCHLORIDE 50 MG: 50 TABLET ORAL at 22:05

## 2019-02-03 RX ADMIN — CYCLOBENZAPRINE HYDROCHLORIDE 10 MG: 10 TABLET, FILM COATED ORAL at 05:58

## 2019-02-03 RX ADMIN — SODIUM CHLORIDE: 9 INJECTION, SOLUTION INTRAVENOUS at 16:54

## 2019-02-03 RX ADMIN — DICYCLOMINE HYDROCHLORIDE 10 MG: 10 CAPSULE ORAL at 06:32

## 2019-02-03 RX ADMIN — CYCLOBENZAPRINE HYDROCHLORIDE 10 MG: 10 TABLET, FILM COATED ORAL at 23:06

## 2019-02-03 RX ADMIN — IPRATROPIUM BROMIDE AND ALBUTEROL SULFATE 1 AMPULE: .5; 3 SOLUTION RESPIRATORY (INHALATION) at 19:42

## 2019-02-03 RX ADMIN — ACETAMINOPHEN 650 MG: 325 TABLET ORAL at 04:09

## 2019-02-03 RX ADMIN — ENOXAPARIN SODIUM 40 MG: 40 INJECTION SUBCUTANEOUS at 09:51

## 2019-02-03 RX ADMIN — BISACODYL 30 MG: 5 TABLET, COATED ORAL at 16:59

## 2019-02-03 RX ADMIN — MAGNESIUM CITRATE 300 ML: 1.75 LIQUID ORAL at 13:08

## 2019-02-03 RX ADMIN — GLIPIZIDE 5 MG: 5 TABLET ORAL at 16:59

## 2019-02-03 RX ADMIN — LISINOPRIL 2.5 MG: 2.5 TABLET ORAL at 09:51

## 2019-02-03 RX ADMIN — GABAPENTIN 300 MG: 300 CAPSULE ORAL at 06:32

## 2019-02-03 RX ADMIN — ACETAMINOPHEN 650 MG: 325 TABLET ORAL at 11:23

## 2019-02-03 RX ADMIN — SPIRONOLACTONE 25 MG: 25 TABLET ORAL at 09:51

## 2019-02-03 RX ADMIN — METOPROLOL TARTRATE 25 MG: 25 TABLET ORAL at 22:05

## 2019-02-03 RX ADMIN — IPRATROPIUM BROMIDE AND ALBUTEROL SULFATE 1 AMPULE: .5; 3 SOLUTION RESPIRATORY (INHALATION) at 12:17

## 2019-02-03 RX ADMIN — SALINE NASAL SPRAY 1 SPRAY: 1.5 SOLUTION NASAL at 09:49

## 2019-02-03 RX ADMIN — METOPROLOL TARTRATE 25 MG: 25 TABLET ORAL at 14:25

## 2019-02-03 RX ADMIN — METFORMIN HYDROCHLORIDE 1000 MG: 1000 TABLET ORAL at 16:59

## 2019-02-03 RX ADMIN — HYDROCHLOROTHIAZIDE 25 MG: 25 TABLET ORAL at 16:59

## 2019-02-03 RX ADMIN — METFORMIN HYDROCHLORIDE 1000 MG: 1000 TABLET ORAL at 09:51

## 2019-02-03 RX ADMIN — DICYCLOMINE HYDROCHLORIDE 10 MG: 10 CAPSULE ORAL at 16:59

## 2019-02-03 RX ADMIN — Medication 10 ML: at 09:51

## 2019-02-03 RX ADMIN — IPRATROPIUM BROMIDE AND ALBUTEROL SULFATE 1 AMPULE: .5; 3 SOLUTION RESPIRATORY (INHALATION) at 16:11

## 2019-02-03 RX ADMIN — CYCLOBENZAPRINE HYDROCHLORIDE 10 MG: 10 TABLET, FILM COATED ORAL at 14:25

## 2019-02-03 ASSESSMENT — PAIN SCALES - GENERAL
PAINLEVEL_OUTOF10: 7
PAINLEVEL_OUTOF10: 5
PAINLEVEL_OUTOF10: 6
PAINLEVEL_OUTOF10: 8

## 2019-02-03 ASSESSMENT — PAIN DESCRIPTION - PAIN TYPE
TYPE: ACUTE PAIN
TYPE: ACUTE PAIN

## 2019-02-03 ASSESSMENT — PAIN - FUNCTIONAL ASSESSMENT
PAIN_FUNCTIONAL_ASSESSMENT: ACTIVITIES ARE NOT PREVENTED
PAIN_FUNCTIONAL_ASSESSMENT: PREVENTS OR INTERFERES SOME ACTIVE ACTIVITIES AND ADLS

## 2019-02-03 ASSESSMENT — PAIN DESCRIPTION - ORIENTATION: ORIENTATION: RIGHT;LEFT

## 2019-02-03 ASSESSMENT — PAIN DESCRIPTION - FREQUENCY
FREQUENCY: INTERMITTENT
FREQUENCY: INTERMITTENT

## 2019-02-03 ASSESSMENT — PAIN DESCRIPTION - DESCRIPTORS
DESCRIPTORS: ACHING;DISCOMFORT
DESCRIPTORS: HEADACHE

## 2019-02-03 ASSESSMENT — PAIN DESCRIPTION - ONSET
ONSET: AWAKENED FROM SLEEP
ONSET: GRADUAL

## 2019-02-03 ASSESSMENT — ENCOUNTER SYMPTOMS: SHORTNESS OF BREATH: 0

## 2019-02-03 ASSESSMENT — PAIN DESCRIPTION - PROGRESSION
CLINICAL_PROGRESSION: NOT CHANGED
CLINICAL_PROGRESSION: GRADUALLY WORSENING

## 2019-02-03 ASSESSMENT — LIFESTYLE VARIABLES: SMOKING_STATUS: 0

## 2019-02-03 ASSESSMENT — PAIN DESCRIPTION - LOCATION
LOCATION: HEAD
LOCATION: HEAD

## 2019-02-04 VITALS
OXYGEN SATURATION: 98 % | BODY MASS INDEX: 28.77 KG/M2 | RESPIRATION RATE: 18 BRPM | DIASTOLIC BLOOD PRESSURE: 80 MMHG | WEIGHT: 162.4 LBS | SYSTOLIC BLOOD PRESSURE: 130 MMHG | TEMPERATURE: 96.6 F | HEART RATE: 90 BPM | HEIGHT: 63 IN

## 2019-02-04 VITALS — OXYGEN SATURATION: 99 % | SYSTOLIC BLOOD PRESSURE: 141 MMHG | DIASTOLIC BLOOD PRESSURE: 69 MMHG

## 2019-02-04 LAB
ALBUMIN SERPL-MCNC: 3.9 G/DL (ref 3.5–5.2)
ALP BLD-CCNC: 86 U/L (ref 35–104)
ALT SERPL-CCNC: 21 U/L (ref 0–32)
ANION GAP SERPL CALCULATED.3IONS-SCNC: 14 MMOL/L (ref 7–16)
ANISOCYTOSIS: ABNORMAL
AST SERPL-CCNC: 25 U/L (ref 0–31)
BASOPHILS ABSOLUTE: 0.07 E9/L (ref 0–0.2)
BASOPHILS RELATIVE PERCENT: 0.8 % (ref 0–2)
BILIRUB SERPL-MCNC: 0.4 MG/DL (ref 0–1.2)
BLOOD CULTURE, ROUTINE: NORMAL
BUN BLDV-MCNC: 5 MG/DL (ref 8–23)
CALCIUM SERPL-MCNC: 9.1 MG/DL (ref 8.6–10.2)
CHLORIDE BLD-SCNC: 102 MMOL/L (ref 98–107)
CO2: 22 MMOL/L (ref 22–29)
CREAT SERPL-MCNC: 0.5 MG/DL (ref 0.5–1)
CULTURE, BLOOD 2: NORMAL
EOSINOPHILS ABSOLUTE: 0.11 E9/L (ref 0.05–0.5)
EOSINOPHILS RELATIVE PERCENT: 1.2 % (ref 0–6)
GFR AFRICAN AMERICAN: >60
GFR NON-AFRICAN AMERICAN: >60 ML/MIN/1.73
GLUCOSE BLD-MCNC: 142 MG/DL (ref 74–99)
HCT VFR BLD CALC: 33.9 % (ref 34–48)
HEMOGLOBIN: 9.7 G/DL (ref 11.5–15.5)
HYPOCHROMIA: ABNORMAL
IMMATURE GRANULOCYTES #: 0.04 E9/L
IMMATURE GRANULOCYTES %: 0.4 % (ref 0–5)
LYMPHOCYTES ABSOLUTE: 2.2 E9/L (ref 1.5–4)
LYMPHOCYTES RELATIVE PERCENT: 24.3 % (ref 20–42)
MCH RBC QN AUTO: 20.6 PG (ref 26–35)
MCHC RBC AUTO-ENTMCNC: 28.6 % (ref 32–34.5)
MCV RBC AUTO: 71.8 FL (ref 80–99.9)
METER GLUCOSE: 120 MG/DL (ref 74–99)
METER GLUCOSE: 154 MG/DL (ref 74–99)
METER GLUCOSE: 175 MG/DL (ref 74–99)
MONOCYTES ABSOLUTE: 0.92 E9/L (ref 0.1–0.95)
MONOCYTES RELATIVE PERCENT: 10.2 % (ref 2–12)
NEUTROPHILS ABSOLUTE: 5.72 E9/L (ref 1.8–7.3)
NEUTROPHILS RELATIVE PERCENT: 63.1 % (ref 43–80)
OVALOCYTES: ABNORMAL
PDW BLD-RTO: 25 FL (ref 11.5–15)
PLATELET # BLD: 317 E9/L (ref 130–450)
PMV BLD AUTO: 10.1 FL (ref 7–12)
POIKILOCYTES: ABNORMAL
POTASSIUM SERPL-SCNC: 3.3 MMOL/L (ref 3.5–5)
RBC # BLD: 4.72 E12/L (ref 3.5–5.5)
SODIUM BLD-SCNC: 138 MMOL/L (ref 132–146)
TOTAL PROTEIN: 7 G/DL (ref 6.4–8.3)
WBC # BLD: 9.1 E9/L (ref 4.5–11.5)

## 2019-02-04 PROCEDURE — 6370000000 HC RX 637 (ALT 250 FOR IP): Performed by: INTERNAL MEDICINE

## 2019-02-04 PROCEDURE — 2580000003 HC RX 258: Performed by: CLINICAL NURSE SPECIALIST

## 2019-02-04 PROCEDURE — 6370000000 HC RX 637 (ALT 250 FOR IP): Performed by: CLINICAL NURSE SPECIALIST

## 2019-02-04 PROCEDURE — 3609009500 HC COLONOSCOPY DIAGNOSTIC OR SCREENING: Performed by: INTERNAL MEDICINE

## 2019-02-04 PROCEDURE — 80053 COMPREHEN METABOLIC PANEL: CPT

## 2019-02-04 PROCEDURE — 0DB98ZX EXCISION OF DUODENUM, VIA NATURAL OR ARTIFICIAL OPENING ENDOSCOPIC, DIAGNOSTIC: ICD-10-PCS | Performed by: INTERNAL MEDICINE

## 2019-02-04 PROCEDURE — 36415 COLL VENOUS BLD VENIPUNCTURE: CPT

## 2019-02-04 PROCEDURE — 6360000002 HC RX W HCPCS: Performed by: CLINICAL NURSE SPECIALIST

## 2019-02-04 PROCEDURE — 2580000003 HC RX 258: Performed by: NEUROMUSCULOSKELETAL MEDICINE & OMM

## 2019-02-04 PROCEDURE — C9113 INJ PANTOPRAZOLE SODIUM, VIA: HCPCS | Performed by: CLINICAL NURSE SPECIALIST

## 2019-02-04 PROCEDURE — 6360000002 HC RX W HCPCS: Performed by: NURSE ANESTHETIST, CERTIFIED REGISTERED

## 2019-02-04 PROCEDURE — 6360000002 HC RX W HCPCS: Performed by: INTERNAL MEDICINE

## 2019-02-04 PROCEDURE — 94640 AIRWAY INHALATION TREATMENT: CPT

## 2019-02-04 PROCEDURE — 87081 CULTURE SCREEN ONLY: CPT

## 2019-02-04 PROCEDURE — 3609012400 HC EGD TRANSORAL BIOPSY SINGLE/MULTIPLE: Performed by: INTERNAL MEDICINE

## 2019-02-04 PROCEDURE — 7100000011 HC PHASE II RECOVERY - ADDTL 15 MIN: Performed by: INTERNAL MEDICINE

## 2019-02-04 PROCEDURE — 2709999900 HC NON-CHARGEABLE SUPPLY: Performed by: INTERNAL MEDICINE

## 2019-02-04 PROCEDURE — 7100000010 HC PHASE II RECOVERY - FIRST 15 MIN: Performed by: INTERNAL MEDICINE

## 2019-02-04 PROCEDURE — 3700000001 HC ADD 15 MINUTES (ANESTHESIA): Performed by: INTERNAL MEDICINE

## 2019-02-04 PROCEDURE — 82962 GLUCOSE BLOOD TEST: CPT

## 2019-02-04 PROCEDURE — 0DJD8ZZ INSPECTION OF LOWER INTESTINAL TRACT, VIA NATURAL OR ARTIFICIAL OPENING ENDOSCOPIC: ICD-10-PCS | Performed by: INTERNAL MEDICINE

## 2019-02-04 PROCEDURE — 0DB68ZX EXCISION OF STOMACH, VIA NATURAL OR ARTIFICIAL OPENING ENDOSCOPIC, DIAGNOSTIC: ICD-10-PCS | Performed by: INTERNAL MEDICINE

## 2019-02-04 PROCEDURE — 2580000003 HC RX 258: Performed by: NURSE ANESTHETIST, CERTIFIED REGISTERED

## 2019-02-04 PROCEDURE — 3700000000 HC ANESTHESIA ATTENDED CARE: Performed by: INTERNAL MEDICINE

## 2019-02-04 PROCEDURE — 88305 TISSUE EXAM BY PATHOLOGIST: CPT

## 2019-02-04 PROCEDURE — 6370000000 HC RX 637 (ALT 250 FOR IP): Performed by: NEUROMUSCULOSKELETAL MEDICINE & OMM

## 2019-02-04 PROCEDURE — 85025 COMPLETE CBC W/AUTO DIFF WBC: CPT

## 2019-02-04 PROCEDURE — 2580000003 HC RX 258: Performed by: INTERNAL MEDICINE

## 2019-02-04 RX ORDER — SODIUM CHLORIDE 9 MG/ML
INJECTION, SOLUTION INTRAVENOUS CONTINUOUS PRN
Status: DISCONTINUED | OUTPATIENT
Start: 2019-02-04 | End: 2019-02-04 | Stop reason: SDUPTHER

## 2019-02-04 RX ORDER — MIDAZOLAM HYDROCHLORIDE 1 MG/ML
INJECTION INTRAMUSCULAR; INTRAVENOUS PRN
Status: DISCONTINUED | OUTPATIENT
Start: 2019-02-04 | End: 2019-02-04 | Stop reason: SDUPTHER

## 2019-02-04 RX ORDER — FENTANYL CITRATE 50 UG/ML
INJECTION, SOLUTION INTRAMUSCULAR; INTRAVENOUS PRN
Status: DISCONTINUED | OUTPATIENT
Start: 2019-02-04 | End: 2019-02-04 | Stop reason: SDUPTHER

## 2019-02-04 RX ORDER — LEVOFLOXACIN 750 MG/1
750 TABLET ORAL DAILY
Qty: 7 TABLET | Refills: 0 | Status: SHIPPED | OUTPATIENT
Start: 2019-02-04 | End: 2019-02-04 | Stop reason: HOSPADM

## 2019-02-04 RX ORDER — PROPOFOL 10 MG/ML
INJECTION, EMULSION INTRAVENOUS PRN
Status: DISCONTINUED | OUTPATIENT
Start: 2019-02-04 | End: 2019-02-04 | Stop reason: SDUPTHER

## 2019-02-04 RX ADMIN — SPIRONOLACTONE 25 MG: 25 TABLET ORAL at 09:20

## 2019-02-04 RX ADMIN — PANTOPRAZOLE SODIUM 40 MG: 40 INJECTION, POWDER, FOR SOLUTION INTRAVENOUS at 09:16

## 2019-02-04 RX ADMIN — MIDAZOLAM HYDROCHLORIDE 2 MG: 1 INJECTION, SOLUTION INTRAMUSCULAR; INTRAVENOUS at 16:26

## 2019-02-04 RX ADMIN — GABAPENTIN 300 MG: 300 CAPSULE ORAL at 18:21

## 2019-02-04 RX ADMIN — BUPROPION HYDROCHLORIDE 100 MG: 100 TABLET, EXTENDED RELEASE ORAL at 09:18

## 2019-02-04 RX ADMIN — PROPOFOL 150 MG: 10 INJECTION, EMULSION INTRAVENOUS at 16:26

## 2019-02-04 RX ADMIN — GLIPIZIDE 5 MG: 5 TABLET ORAL at 18:22

## 2019-02-04 RX ADMIN — SODIUM CHLORIDE 100 MG: 9 INJECTION, SOLUTION INTRAVENOUS at 13:31

## 2019-02-04 RX ADMIN — SODIUM CHLORIDE: 9 INJECTION, SOLUTION INTRAVENOUS at 09:15

## 2019-02-04 RX ADMIN — SPIRONOLACTONE 25 MG: 25 TABLET ORAL at 18:22

## 2019-02-04 RX ADMIN — HYDROCHLOROTHIAZIDE 25 MG: 25 TABLET ORAL at 18:21

## 2019-02-04 RX ADMIN — FENTANYL CITRATE 50 MCG: 50 INJECTION, SOLUTION INTRAMUSCULAR; INTRAVENOUS at 16:26

## 2019-02-04 RX ADMIN — FLUOXETINE 60 MG: 20 CAPSULE ORAL at 09:17

## 2019-02-04 RX ADMIN — SALINE NASAL SPRAY 1 SPRAY: 1.5 SOLUTION NASAL at 18:23

## 2019-02-04 RX ADMIN — METOPROLOL TARTRATE 25 MG: 25 TABLET ORAL at 14:12

## 2019-02-04 RX ADMIN — METFORMIN HYDROCHLORIDE 1000 MG: 1000 TABLET ORAL at 18:21

## 2019-02-04 RX ADMIN — DICYCLOMINE HYDROCHLORIDE 10 MG: 10 CAPSULE ORAL at 09:17

## 2019-02-04 RX ADMIN — FENTANYL CITRATE 50 MCG: 50 INJECTION, SOLUTION INTRAMUSCULAR; INTRAVENOUS at 16:31

## 2019-02-04 RX ADMIN — GABAPENTIN 300 MG: 300 CAPSULE ORAL at 09:17

## 2019-02-04 RX ADMIN — SALINE NASAL SPRAY 1 SPRAY: 1.5 SOLUTION NASAL at 09:20

## 2019-02-04 RX ADMIN — HYDROCHLOROTHIAZIDE 25 MG: 25 TABLET ORAL at 09:18

## 2019-02-04 RX ADMIN — ACETAMINOPHEN 650 MG: 325 TABLET ORAL at 18:22

## 2019-02-04 RX ADMIN — SODIUM CHLORIDE: 9 INJECTION, SOLUTION INTRAVENOUS at 16:26

## 2019-02-04 RX ADMIN — Medication 10 ML: at 09:16

## 2019-02-04 RX ADMIN — METOPROLOL TARTRATE 25 MG: 25 TABLET ORAL at 06:11

## 2019-02-04 RX ADMIN — IPRATROPIUM BROMIDE AND ALBUTEROL SULFATE 1 AMPULE: .5; 3 SOLUTION RESPIRATORY (INHALATION) at 12:34

## 2019-02-04 RX ADMIN — MAGNESIUM CITRATE 296 ML: 1.75 LIQUID ORAL at 04:58

## 2019-02-04 RX ADMIN — SODIUM CHLORIDE 25 MG: 9 INJECTION, SOLUTION INTRAVENOUS at 12:12

## 2019-02-04 RX ADMIN — DICYCLOMINE HYDROCHLORIDE 10 MG: 10 CAPSULE ORAL at 18:22

## 2019-02-04 ASSESSMENT — PAIN SCALES - GENERAL
PAINLEVEL_OUTOF10: 8
PAINLEVEL_OUTOF10: 0

## 2019-02-05 LAB — JAK2 GENE MUTATION QUAL: NOT DETECTED

## 2019-02-06 LAB
CLOTEST: NORMAL
Lab: NORMAL
REPORT: NORMAL
THIS TEST SENT TO: NORMAL

## 2019-02-08 LAB
EKG ATRIAL RATE: 74 BPM
EKG P AXIS: 34 DEGREES
EKG P-R INTERVAL: 158 MS
EKG Q-T INTERVAL: 418 MS
EKG QRS DURATION: 96 MS
EKG QTC CALCULATION (BAZETT): 463 MS
EKG R AXIS: 0 DEGREES
EKG T AXIS: 48 DEGREES
EKG VENTRICULAR RATE: 74 BPM

## 2019-02-26 ENCOUNTER — HOSPITAL ENCOUNTER (OUTPATIENT)
Dept: GENERAL RADIOLOGY | Age: 64
Discharge: HOME | End: 2019-02-28
Payer: MEDICARE

## 2019-02-26 ENCOUNTER — HOSPITAL ENCOUNTER (OUTPATIENT)
Age: 64
Discharge: HOME | End: 2019-02-28
Payer: MEDICARE

## 2019-02-26 DIAGNOSIS — J18.9 UNRESOLVED PNEUMONIA: ICD-10-CM

## 2019-02-26 PROCEDURE — 71046 X-RAY EXAM CHEST 2 VIEWS: CPT

## 2019-03-08 ENCOUNTER — HOSPITAL ENCOUNTER (OUTPATIENT)
Age: 64
Discharge: HOME | End: 2019-03-08
Payer: MEDICARE

## 2019-03-08 LAB
HCT VFR BLD CALC: 36.8 % (ref 34–48)
HEMOGLOBIN: 10.4 G/DL (ref 11.5–15.5)
MCH RBC QN AUTO: 22 PG (ref 26–35)
MCHC RBC AUTO-ENTMCNC: 28.3 % (ref 32–34.5)
MCV RBC AUTO: 77.8 FL (ref 80–99.9)
PDW BLD-RTO: 26.4 FL (ref 11.5–15)
PLATELET # BLD: 378 E9/L (ref 130–450)
PMV BLD AUTO: 9.8 FL (ref 7–12)
RBC # BLD: 4.73 E12/L (ref 3.5–5.5)
WBC # BLD: 10.8 E9/L (ref 4.5–11.5)

## 2019-03-08 PROCEDURE — 36415 COLL VENOUS BLD VENIPUNCTURE: CPT

## 2019-03-08 PROCEDURE — 85027 COMPLETE CBC AUTOMATED: CPT

## 2019-08-27 ENCOUNTER — HOSPITAL ENCOUNTER (EMERGENCY)
Age: 64
Discharge: HOME | End: 2019-08-27
Payer: MEDICARE

## 2019-08-27 ENCOUNTER — APPOINTMENT (OUTPATIENT)
Dept: CT IMAGING | Age: 64
End: 2019-08-27
Payer: MEDICARE

## 2019-08-27 VITALS
BODY MASS INDEX: 29.23 KG/M2 | HEIGHT: 63 IN | HEART RATE: 83 BPM | OXYGEN SATURATION: 91 % | DIASTOLIC BLOOD PRESSURE: 56 MMHG | SYSTOLIC BLOOD PRESSURE: 123 MMHG | WEIGHT: 165 LBS | TEMPERATURE: 98.1 F | RESPIRATION RATE: 18 BRPM

## 2019-08-27 DIAGNOSIS — S00.03XA CONTUSION OF SCALP, INITIAL ENCOUNTER: Primary | ICD-10-CM

## 2019-08-27 DIAGNOSIS — S00.10XA PERIORBITAL ECCHYMOSIS, UNSPECIFIED LATERALITY, INITIAL ENCOUNTER: ICD-10-CM

## 2019-08-27 DIAGNOSIS — G44.309 POST-TRAUMATIC HEADACHE, NOT INTRACTABLE, UNSPECIFIED CHRONICITY PATTERN: ICD-10-CM

## 2019-08-27 PROCEDURE — 70450 CT HEAD/BRAIN W/O DYE: CPT

## 2019-08-27 PROCEDURE — 99284 EMERGENCY DEPT VISIT MOD MDM: CPT

## 2019-08-27 PROCEDURE — 70486 CT MAXILLOFACIAL W/O DYE: CPT

## 2019-08-27 ASSESSMENT — PAIN DESCRIPTION - LOCATION: LOCATION: HEAD

## 2019-08-27 ASSESSMENT — PAIN DESCRIPTION - PAIN TYPE: TYPE: ACUTE PAIN

## 2019-08-27 ASSESSMENT — PAIN SCALES - GENERAL: PAINLEVEL_OUTOF10: 8

## 2019-08-27 NOTE — ED PROVIDER NOTES
made to ensure accuracy; however, inadvertent computerized transcription errors may be present.   END OF ED PROVIDER NOTE       Anabel Escobedo PA-C  08/27/19 Nora Koehler  08/27/19 2008

## 2019-09-24 ENCOUNTER — HOSPITAL ENCOUNTER (OUTPATIENT)
Dept: MRI IMAGING | Age: 64
Discharge: HOME | End: 2019-09-26
Payer: MEDICARE

## 2019-09-24 DIAGNOSIS — R42 DIZZINESS AND GIDDINESS: ICD-10-CM

## 2019-09-24 PROCEDURE — 70551 MRI BRAIN STEM W/O DYE: CPT

## 2021-01-20 LAB
ALBUMIN SERPL-MCNC: NORMAL G/DL
ALP BLD-CCNC: NORMAL U/L
ALT SERPL-CCNC: NORMAL U/L
ANION GAP SERPL CALCULATED.3IONS-SCNC: NORMAL MMOL/L
AST SERPL-CCNC: NORMAL U/L
AVERAGE GLUCOSE: NORMAL
BASOPHILS ABSOLUTE: NORMAL
BASOPHILS RELATIVE PERCENT: NORMAL
BILIRUB SERPL-MCNC: NORMAL MG/DL
BUN BLDV-MCNC: NORMAL MG/DL
CALCIUM SERPL-MCNC: NORMAL MG/DL
CHLORIDE BLD-SCNC: NORMAL MMOL/L
CHOLESTEROL, TOTAL: NORMAL
CHOLESTEROL/HDL RATIO: NORMAL
CO2: NORMAL
CREAT SERPL-MCNC: NORMAL MG/DL
EOSINOPHILS ABSOLUTE: NORMAL
EOSINOPHILS RELATIVE PERCENT: NORMAL
GFR CALCULATED: NORMAL
GLUCOSE BLD-MCNC: NORMAL MG/DL
HBA1C MFR BLD: 6.3 %
HCT VFR BLD CALC: NORMAL %
HDLC SERPL-MCNC: NORMAL MG/DL
HEMOGLOBIN: NORMAL
LDL CHOLESTEROL CALCULATED: NORMAL
LYMPHOCYTES ABSOLUTE: NORMAL
LYMPHOCYTES RELATIVE PERCENT: NORMAL
MCH RBC QN AUTO: NORMAL PG
MCHC RBC AUTO-ENTMCNC: NORMAL G/DL
MCV RBC AUTO: NORMAL FL
MONOCYTES ABSOLUTE: NORMAL
MONOCYTES RELATIVE PERCENT: NORMAL
NEUTROPHILS ABSOLUTE: NORMAL
NEUTROPHILS RELATIVE PERCENT: NORMAL
NONHDLC SERPL-MCNC: NORMAL MG/DL
PLATELET # BLD: NORMAL 10*3/UL
PMV BLD AUTO: NORMAL FL
POTASSIUM SERPL-SCNC: NORMAL MMOL/L
RBC # BLD: NORMAL 10*6/UL
SODIUM BLD-SCNC: NORMAL MMOL/L
TOTAL PROTEIN: NORMAL
TRIGL SERPL-MCNC: NORMAL MG/DL
VLDLC SERPL CALC-MCNC: NORMAL MG/DL
WBC # BLD: NORMAL 10*3/UL

## 2021-05-07 ENCOUNTER — HOSPITAL ENCOUNTER (OUTPATIENT)
Dept: NEUROLOGY | Age: 66
Discharge: HOME | End: 2021-05-07
Payer: MEDICARE

## 2021-05-07 PROCEDURE — 95910 NRV CNDJ TEST 7-8 STUDIES: CPT

## 2021-05-07 PROCEDURE — 95886 MUSC TEST DONE W/N TEST COMP: CPT | Performed by: PSYCHIATRY & NEUROLOGY

## 2021-05-07 PROCEDURE — 95886 MUSC TEST DONE W/N TEST COMP: CPT

## 2021-05-07 PROCEDURE — 95910 NRV CNDJ TEST 7-8 STUDIES: CPT | Performed by: PSYCHIATRY & NEUROLOGY

## 2021-05-07 NOTE — PROCEDURES
[facility]  Electrodiagnostic Laboratory   [place]         Full Name: Isaias Hanks Gender: Female  MRN: [id number]      Visit Date: 5/7/2021 13:12  Height: 5 feet 3 inch  Weight: 160 lbs; BMI 29.2  Notes: Diabetic Neuropathy         Motor NCS      Nerve / Sites Lat. Lat Diff Amplitude Amp. 1-2 Distance Velocity Temp.    ms ms mV % cm m/s °C   R Peroneal - EDB      Ankle 4.22  4.2 100 8  31.9      Pop fossa 12.40 8.18 3.5 84.4 36 44 31.9   L Peroneal - EDB      Ankle 5.63  2.8 100 8  31.6      Pop fossa 14.11 8.49 4.4 159 36 42 31.6   R Tibial - AH      Ankle 3.13  7.0 100 8  31.7      Pop fossa 12.97 9.84 4.8 68.4 39 40 31.7       Sensory NCS      Nerve / Sites Onset Lat Peak Lat PP Amp Distance Velocity Temp.    ms ms µV cm m/s °C   R Superficial peroneal - Ankle      Lat leg NR NR NR 10 NR 31.9   L Superficial peroneal - Ankle      Lat leg NR NR NR 10 NR 31.5   R Sural - Ankle (Calf)      Calf NR NR NR 14 NR 31.9       F  Wave      Nerve F Lat M Lat F-M Lat    ms ms ms   R Peroneal - EDB 49.3 2.4 46.8   R Tibial - AH 55.9 3.5 52.3   L Peroneal - EDB 51.8 4.6 47.2       H Reflex      Nerve Lat Hmax    ms   R Tibial - Soleus 30.2   L Tibial - Soleus 31.7       EMG         EMG Summary Table     Spontaneous MUAP Recruitment   Muscle IA Fib PSW Fasc H.F. Amp Dur. PPP Pattern   R. Lumbar paraspinals (mid) N None None None None N N N N   R. Lumbar paraspinals (low) Inc/DNT None None None None N N N N   R. Sacral paraspinals Incr None 1+ None None N N N N   R. Gluteus medius N None None None Nascent N 2+ 1+ Sl Decr   R. Biceps femoris (short head) N None None None None N N N N   R. Vastus lateralis N None None None None N N N N   R. Gastrocnemius (Medial head) N None None None None N N N N   R. Tibialis anterior N None None None None N 2+ 2+ Sl Decr   R. Flexor digitorum longus N None None None None N 1+ 1+ Sl Decr   R. Extensor hallucis longus Incr Few Few None None N 1+ 1+ N   R.  Dorsal interossei (pedis) N None None None None N 2+ 2+ Sl Decr   R. Extensor digitorum brevis N None None None None N 2+ 2+ Sl Decr         Nerve conduction studies in both legs revealed the following abnormalities---absent bilateral superficial peroneal and right sural responses. These findings were compared to the referential values in this laboratory, available upon request.    Monopolar needle examination of the right leg displayed chronic axonal loss with denervation in the distal muscles of that limb as well as the muscle supplied by the L5 motor root. Needle testing of the paraspinals produced acute denervation changes. Electrodiagnostic examination of both legs disclosed evidence diagnostic of the following---    1. A diffuse, symmetrical sensorimotor peripheral neuropathy of the axonal degenerative type, of moderate severity. 2.  A right L5 motor radiculopathy or intracanalicular lesion, proven by the abnormal needle testing of the paraspinal.    There were no other peripheral neuropathies. There were no other motor radiculopathies or intracanalicular lesions. Sensory radiculopathies cannot be evaluated by electrodiagnostic means. Clinically, the patient presented with sensory loss and tingling sensations in both feet for years. She more recently noted back pains radiating into her right leg. On brief neurological examination, I found sensory loss in both feet but no motor compromise. Her difficulties are related to her diabetic peripheral neuropathy as well as lumbar radiculopathy. Imaging studies of the lumbosacral spine were recommended. Clinical correlation was highly advised.

## 2021-12-15 LAB
ALBUMIN SERPL-MCNC: NORMAL G/DL
ALP BLD-CCNC: NORMAL U/L
ALT SERPL-CCNC: NORMAL U/L
ANION GAP SERPL CALCULATED.3IONS-SCNC: NORMAL MMOL/L
AST SERPL-CCNC: NORMAL U/L
AVERAGE GLUCOSE: 148
BILIRUB SERPL-MCNC: NORMAL MG/DL
BUN BLDV-MCNC: NORMAL MG/DL
CALCIUM SERPL-MCNC: NORMAL MG/DL
CHLORIDE BLD-SCNC: NORMAL MMOL/L
CHOLESTEROL, TOTAL: NORMAL
CHOLESTEROL/HDL RATIO: NORMAL
CO2: NORMAL
CREAT SERPL-MCNC: NORMAL MG/DL
CREATININE, URINE: 65.3
GFR CALCULATED: NORMAL
GLUCOSE BLD-MCNC: NORMAL MG/DL
HBA1C MFR BLD: 6.8 %
HDLC SERPL-MCNC: NORMAL MG/DL
LDL CHOLESTEROL CALCULATED: NORMAL
MICROALBUMIN/CREAT 24H UR: 28.4 MG/G{CREAT}
MICROALBUMIN/CREAT UR-RTO: 43
NONHDLC SERPL-MCNC: NORMAL MG/DL
POTASSIUM SERPL-SCNC: NORMAL MMOL/L
SODIUM BLD-SCNC: NORMAL MMOL/L
TOTAL PROTEIN: NORMAL
TRIGL SERPL-MCNC: NORMAL MG/DL
TSH SERPL DL<=0.05 MIU/L-ACNC: NORMAL M[IU]/L
VITAMIN D 25-HYDROXY: NORMAL
VITAMIN D2, 25 HYDROXY: NORMAL
VITAMIN D3,25 HYDROXY: NORMAL
VLDLC SERPL CALC-MCNC: NORMAL MG/DL

## 2022-08-23 ENCOUNTER — HOSPITAL ENCOUNTER (OUTPATIENT)
Dept: MAMMOGRAPHY | Age: 67
Discharge: HOME | End: 2022-08-25
Payer: MEDICARE

## 2022-08-23 DIAGNOSIS — Z12.31 ENCOUNTER FOR SCREENING MAMMOGRAM FOR MALIGNANT NEOPLASM OF BREAST: ICD-10-CM

## 2022-08-23 PROCEDURE — 77067 SCR MAMMO BI INCL CAD: CPT

## 2022-09-20 RX ORDER — TOPIRAMATE 50 MG/1
50 TABLET, FILM COATED ORAL DAILY
COMMUNITY

## 2022-09-20 RX ORDER — FAMOTIDINE 40 MG/1
40 TABLET, FILM COATED ORAL DAILY
COMMUNITY

## 2022-09-26 PROBLEM — J12.3 PNEUMONIA DUE TO HUMAN METAPNEUMOVIRUS: Status: RESOLVED | Noted: 2019-02-01 | Resolved: 2022-09-26

## 2022-09-26 PROBLEM — E78.5 SERUM LIPIDS HIGH: Status: ACTIVE | Noted: 2022-09-26

## 2022-09-26 PROBLEM — G43.909 MIGRAINE WITHOUT STATUS MIGRAINOSUS, NOT INTRACTABLE: Status: ACTIVE | Noted: 2022-09-26

## 2022-09-26 NOTE — PROGRESS NOTES
Silvia Pietro presents today follow up of HTN, High lipids, NIDDM, COPD, Fibromyalgia, Migraine Headaches    Current Outpatient Medications   Medication Sig Dispense Refill    famotidine (PEPCID) 40 MG tablet Take 40 mg by mouth daily      topiramate (TOPAMAX) 50 MG tablet Take 50 mg by mouth daily      ipratropium (ATROVENT HFA) 17 MCG/ACT inhaler Inhale 2 puffs into the lungs every 4 hours as needed for Wheezing      glipiZIDE (GLUCOTROL) 5 MG tablet Take 10 mg by mouth 2 times daily (before meals)      ibuprofen (ADVIL;MOTRIN) 200 MG tablet Take 200 mg by mouth every 6 hours as needed for Pain      pantoprazole (PROTONIX) 40 MG tablet Take 40 mg by mouth daily      rosuvastatin (CRESTOR) 20 MG tablet Take 20 mg by mouth daily      buPROPion (WELLBUTRIN SR) 100 MG extended release tablet Take 100 mg by mouth 2 times daily Pt takes 1 tab in AM and 2 tabs at bedtime      cyclobenzaprine (FLEXERIL) 10 MG tablet Take 10 mg by mouth 3 times daily as needed for Muscle spasms      dicyclomine (BENTYL) 10 MG capsule Take 10 mg by mouth 2 times daily (before meals)       gabapentin (NEURONTIN) 400 MG capsule Take 400 mg by mouth 3 times daily. lisinopril (PRINIVIL;ZESTRIL) 2.5 MG tablet Take 2.5 mg by mouth daily      metFORMIN (GLUCOPHAGE) 1000 MG tablet Take 1,000 mg by mouth 2 times daily (with meals)       metoprolol tartrate (LOPRESSOR) 25 MG tablet Take 25 mg by mouth three times daily      spironolactone-hydroCHLOROthiazide (ALDACTAZIDE) 25-25 MG per tablet Take 1 tablet by mouth 2 times daily      traZODone (DESYREL) 50 MG tablet Take 50 mg by mouth nightly      butalbital-aspirin-caffeine (FIORINAL) -40 MG capsule Take 1 capsule by mouth every 8 hours as needed for Headaches. . (Patient not taking: Reported on 9/27/2022)      Acai Berry 500 MG CAPS Take 1,000 mg by mouth 2 times daily (Patient not taking: Reported on 9/27/2022)      ammonium lactate (AMLACTIN) 12 % cream Apply topically as needed for Dry Skin Apply topically as needed. FLUoxetine (PROZAC) 20 MG capsule Take 60 mg by mouth daily (Patient not taking: Reported on 9/27/2022)      lidocaine (LIDODERM) 5 % Place 3 patches onto the skin daily 12 hours on, 12 hours off.      nortriptyline (PAMELOR) 10 MG capsule Take 10 mg by mouth nightly (Patient not taking: Reported on 9/27/2022)       No current facility-administered medications for this visit. Past Medical History:   Diagnosis Date    Anemia     COPD (chronic obstructive pulmonary disease) ([facility])     Diabetes mellitus (HCC)     Fibromyalgia     GERD (gastroesophageal reflux disease)     Hx of migraine headaches     Hyperlipidemia     Hypertension     Major depressive disorder     Polycythemia vera ([facility])     Raynaud disease           Subjective:  AS above. Overall doing well, no chest pain, sob or swelling. Stays quite active, watches diet, bs staying under 140. Review of Systems   Constitutional:  Negative for activity change, appetite change and chills. HENT:  Negative for congestion, ear pain, mouth sores, postnasal drip, rhinorrhea, sinus pressure, sneezing, sore throat and trouble swallowing. Eyes:  Negative for visual disturbance. Cardiovascular:  Negative for chest pain, palpitations and leg swelling. Gastrointestinal:  Negative for abdominal distention, abdominal pain, blood in stool, constipation, diarrhea, nausea and vomiting. Endocrine: Negative for cold intolerance, heat intolerance, polydipsia and polyuria. Genitourinary:  Negative for difficulty urinating, dysuria, flank pain, frequency and urgency. Musculoskeletal:  Negative for arthralgias, back pain, gait problem, neck pain and neck stiffness. Skin: Negative. Negative for color change. Allergic/Immunologic: Negative. Neurological:  Negative for dizziness, tremors, speech difficulty, weakness, light-headedness and headaches. Hematological: Negative. Psychiatric/Behavioral: Negative. Objective:  /68 (Site: Right Upper Arm, Position: Sitting, Cuff Size: Medium Adult)   Pulse 84   Temp 98.7 °F (37.1 °C)   Ht 5' 3\" (1.6 m)   Wt 161 lb (73 kg)   SpO2 96%   BMI 28.52 kg/m²      Physical Exam  HENT:      Head: Normocephalic. Right Ear: Tympanic membrane and external ear normal. There is no impacted cerumen. Left Ear: Tympanic membrane and external ear normal. There is no impacted cerumen. Nose: Nose normal.      Mouth/Throat:      Pharynx: Oropharynx is clear. No oropharyngeal exudate. Eyes:      Extraocular Movements: Extraocular movements intact. Conjunctiva/sclera: Conjunctivae normal.      Pupils: Pupils are equal, round, and reactive to light. Cardiovascular:      Rate and Rhythm: Normal rate and regular rhythm. Heart sounds: No murmur heard. No friction rub. No gallop. Pulmonary:      Effort: Pulmonary effort is normal.      Breath sounds: Normal breath sounds. Abdominal:      General: Bowel sounds are normal. There is no distension. Palpations: Abdomen is soft. There is no mass. Tenderness: There is no abdominal tenderness. There is no guarding or rebound. Musculoskeletal:         General: No swelling, tenderness or deformity. Normal range of motion. Cervical back: Normal range of motion and neck supple. No tenderness. Lymphadenopathy:      Cervical: No cervical adenopathy. Skin:     General: Skin is warm. Coloration: Skin is not pale. Findings: No rash. Neurological:      General: No focal deficit present. Mental Status: She is alert and oriented to person, place, and time. Assessment:  Mariam Osuna was seen today for diabetes and hypertension.     Diagnoses and all orders for this visit:    Hypertension, unspecified type    Type 2 diabetes mellitus without complication, without long-term current use of insulin (HCC)    Serum lipids high    Chronic obstructive pulmonary disease, unspecified COPD type ([facility]    Fibromyalgia    Migraine without status migrainosus, not intractable, unspecified migraine type

## 2022-09-27 ENCOUNTER — OFFICE VISIT (OUTPATIENT)
Dept: PRIMARY CARE CLINIC | Age: 67
End: 2022-09-27
Payer: MEDICARE

## 2022-09-27 VITALS
DIASTOLIC BLOOD PRESSURE: 68 MMHG | WEIGHT: 161 LBS | BODY MASS INDEX: 28.53 KG/M2 | HEART RATE: 84 BPM | HEIGHT: 63 IN | TEMPERATURE: 98.7 F | SYSTOLIC BLOOD PRESSURE: 102 MMHG | OXYGEN SATURATION: 96 %

## 2022-09-27 DIAGNOSIS — E11.9 TYPE 2 DIABETES MELLITUS WITHOUT COMPLICATION, WITHOUT LONG-TERM CURRENT USE OF INSULIN (HCC): ICD-10-CM

## 2022-09-27 DIAGNOSIS — J44.9 CHRONIC OBSTRUCTIVE PULMONARY DISEASE, UNSPECIFIED COPD TYPE (HCC): ICD-10-CM

## 2022-09-27 DIAGNOSIS — G43.909 MIGRAINE WITHOUT STATUS MIGRAINOSUS, NOT INTRACTABLE, UNSPECIFIED MIGRAINE TYPE: ICD-10-CM

## 2022-09-27 DIAGNOSIS — M79.7 FIBROMYALGIA: ICD-10-CM

## 2022-09-27 DIAGNOSIS — E78.5 SERUM LIPIDS HIGH: ICD-10-CM

## 2022-09-27 DIAGNOSIS — I10 HYPERTENSION, UNSPECIFIED TYPE: Primary | ICD-10-CM

## 2022-09-27 PROCEDURE — 99213 OFFICE O/P EST LOW 20 MIN: CPT | Performed by: INTERNAL MEDICINE

## 2022-09-27 PROCEDURE — 3051F HG A1C>EQUAL 7.0%<8.0%: CPT | Performed by: INTERNAL MEDICINE

## 2022-09-27 PROCEDURE — 1123F ACP DISCUSS/DSCN MKR DOCD: CPT | Performed by: INTERNAL MEDICINE

## 2022-09-27 SDOH — ECONOMIC STABILITY: FOOD INSECURITY: WITHIN THE PAST 12 MONTHS, YOU WORRIED THAT YOUR FOOD WOULD RUN OUT BEFORE YOU GOT MONEY TO BUY MORE.: SOMETIMES TRUE

## 2022-09-27 SDOH — ECONOMIC STABILITY: FOOD INSECURITY: WITHIN THE PAST 12 MONTHS, THE FOOD YOU BOUGHT JUST DIDN'T LAST AND YOU DIDN'T HAVE MONEY TO GET MORE.: SOMETIMES TRUE

## 2022-09-27 ASSESSMENT — ENCOUNTER SYMPTOMS
COLOR CHANGE: 0
SORE THROAT: 0
NAUSEA: 0
RHINORRHEA: 0
CONSTIPATION: 0
ALLERGIC/IMMUNOLOGIC NEGATIVE: 1
ABDOMINAL DISTENTION: 0
TROUBLE SWALLOWING: 0
VOMITING: 0
BACK PAIN: 0
SINUS PRESSURE: 0
ABDOMINAL PAIN: 0
DIARRHEA: 0
BLOOD IN STOOL: 0

## 2022-09-27 ASSESSMENT — SOCIAL DETERMINANTS OF HEALTH (SDOH): HOW HARD IS IT FOR YOU TO PAY FOR THE VERY BASICS LIKE FOOD, HOUSING, MEDICAL CARE, AND HEATING?: HARD

## 2022-12-02 RX ORDER — GLIPIZIDE 10 MG/1
TABLET ORAL
Qty: 180 TABLET | Refills: 0 | Status: SHIPPED | OUTPATIENT
Start: 2022-12-02

## 2022-12-02 RX ORDER — CYCLOBENZAPRINE HCL 10 MG
TABLET ORAL
Qty: 90 TABLET | Refills: 0 | Status: SHIPPED | OUTPATIENT
Start: 2022-12-02

## 2022-12-02 RX ORDER — PANTOPRAZOLE SODIUM 40 MG/1
TABLET, DELAYED RELEASE ORAL
Qty: 90 TABLET | Refills: 0 | Status: SHIPPED | OUTPATIENT
Start: 2022-12-02

## 2022-12-02 RX ORDER — LISINOPRIL 2.5 MG/1
TABLET ORAL
Qty: 90 TABLET | Refills: 0 | Status: SHIPPED | OUTPATIENT
Start: 2022-12-02

## 2022-12-02 RX ORDER — ROSUVASTATIN CALCIUM 20 MG/1
TABLET, COATED ORAL
Qty: 90 TABLET | Refills: 0 | Status: SHIPPED | OUTPATIENT
Start: 2022-12-02

## 2022-12-02 RX ORDER — SPIRONOLACTONE AND HYDROCHLOROTHIAZIDE 25; 25 MG/1; MG/1
TABLET ORAL
Qty: 90 TABLET | Refills: 0 | Status: SHIPPED | OUTPATIENT
Start: 2022-12-02

## 2022-12-02 RX ORDER — GABAPENTIN 400 MG/1
CAPSULE ORAL
Qty: 270 CAPSULE | Refills: 0 | Status: SHIPPED | OUTPATIENT
Start: 2022-12-02 | End: 2023-03-02

## 2022-12-02 RX ORDER — TOPIRAMATE 50 MG/1
TABLET, FILM COATED ORAL
Qty: 90 TABLET | Refills: 0 | Status: SHIPPED | OUTPATIENT
Start: 2022-12-02

## 2022-12-02 RX ORDER — DICYCLOMINE HCL 20 MG
TABLET ORAL
Qty: 90 TABLET | Refills: 0 | Status: SHIPPED | OUTPATIENT
Start: 2022-12-02

## 2022-12-02 RX ORDER — FAMOTIDINE 40 MG/1
TABLET, FILM COATED ORAL
Qty: 90 TABLET | Refills: 0 | Status: SHIPPED | OUTPATIENT
Start: 2022-12-02

## 2022-12-27 RX ORDER — DICYCLOMINE HCL 20 MG
TABLET ORAL
Qty: 90 TABLET | Refills: 0 | OUTPATIENT
Start: 2022-12-27

## 2022-12-27 RX ORDER — CYCLOBENZAPRINE HCL 10 MG
TABLET ORAL
Qty: 90 TABLET | Refills: 0 | OUTPATIENT
Start: 2022-12-27

## 2023-01-20 DIAGNOSIS — F51.01 PRIMARY INSOMNIA: Primary | ICD-10-CM

## 2023-01-20 RX ORDER — DICYCLOMINE HCL 20 MG
TABLET ORAL
Qty: 90 TABLET | Refills: 0 | Status: SHIPPED | OUTPATIENT
Start: 2023-01-20

## 2023-01-20 RX ORDER — TRAZODONE HYDROCHLORIDE 50 MG/1
50 TABLET ORAL NIGHTLY
Qty: 90 TABLET | Refills: 0 | Status: SHIPPED | OUTPATIENT
Start: 2023-01-20

## 2023-02-03 ENCOUNTER — OFFICE VISIT (OUTPATIENT)
Dept: PRIMARY CARE CLINIC | Age: 68
End: 2023-02-03
Payer: MEDICARE

## 2023-02-03 VITALS
HEIGHT: 63 IN | SYSTOLIC BLOOD PRESSURE: 116 MMHG | DIASTOLIC BLOOD PRESSURE: 68 MMHG | TEMPERATURE: 97.1 F | WEIGHT: 165 LBS | BODY MASS INDEX: 29.23 KG/M2

## 2023-02-03 DIAGNOSIS — E78.2 MIXED HYPERLIPIDEMIA: ICD-10-CM

## 2023-02-03 DIAGNOSIS — I10 ESSENTIAL HYPERTENSION: Primary | ICD-10-CM

## 2023-02-03 DIAGNOSIS — K21.9 GASTROESOPHAGEAL REFLUX DISEASE WITHOUT ESOPHAGITIS: ICD-10-CM

## 2023-02-03 DIAGNOSIS — J40 BRONCHITIS: ICD-10-CM

## 2023-02-03 DIAGNOSIS — E11.9 TYPE 2 DIABETES MELLITUS WITHOUT COMPLICATION, WITHOUT LONG-TERM CURRENT USE OF INSULIN (HCC): ICD-10-CM

## 2023-02-03 PROCEDURE — 3078F DIAST BP <80 MM HG: CPT | Performed by: INTERNAL MEDICINE

## 2023-02-03 PROCEDURE — 99214 OFFICE O/P EST MOD 30 MIN: CPT | Performed by: INTERNAL MEDICINE

## 2023-02-03 PROCEDURE — 1123F ACP DISCUSS/DSCN MKR DOCD: CPT | Performed by: INTERNAL MEDICINE

## 2023-02-03 PROCEDURE — 3074F SYST BP LT 130 MM HG: CPT | Performed by: INTERNAL MEDICINE

## 2023-02-03 RX ORDER — CYCLOBENZAPRINE HCL 10 MG
10 TABLET ORAL 3 TIMES DAILY
Qty: 90 TABLET | Refills: 2 | Status: SHIPPED | OUTPATIENT
Start: 2023-02-03

## 2023-02-03 RX ORDER — LEVOFLOXACIN 500 MG/1
500 TABLET, FILM COATED ORAL DAILY
Qty: 10 TABLET | Refills: 0 | Status: SHIPPED | OUTPATIENT
Start: 2023-02-03 | End: 2023-02-13

## 2023-02-03 SDOH — ECONOMIC STABILITY: FOOD INSECURITY: WITHIN THE PAST 12 MONTHS, YOU WORRIED THAT YOUR FOOD WOULD RUN OUT BEFORE YOU GOT MONEY TO BUY MORE.: SOMETIMES TRUE

## 2023-02-03 SDOH — ECONOMIC STABILITY: INCOME INSECURITY: HOW HARD IS IT FOR YOU TO PAY FOR THE VERY BASICS LIKE FOOD, HOUSING, MEDICAL CARE, AND HEATING?: SOMEWHAT HARD

## 2023-02-03 SDOH — ECONOMIC STABILITY: FOOD INSECURITY: WITHIN THE PAST 12 MONTHS, THE FOOD YOU BOUGHT JUST DIDN'T LAST AND YOU DIDN'T HAVE MONEY TO GET MORE.: SOMETIMES TRUE

## 2023-02-03 SDOH — ECONOMIC STABILITY: HOUSING INSECURITY
IN THE LAST 12 MONTHS, WAS THERE A TIME WHEN YOU DID NOT HAVE A STEADY PLACE TO SLEEP OR SLEPT IN A SHELTER (INCLUDING NOW)?: NO

## 2023-02-03 ASSESSMENT — PATIENT HEALTH QUESTIONNAIRE - PHQ9
SUM OF ALL RESPONSES TO PHQ QUESTIONS 1-9: 15
5. POOR APPETITE OR OVEREATING: 0
7. TROUBLE CONCENTRATING ON THINGS, SUCH AS READING THE NEWSPAPER OR WATCHING TELEVISION: 1
SUM OF ALL RESPONSES TO PHQ QUESTIONS 1-9: 14
8. MOVING OR SPEAKING SO SLOWLY THAT OTHER PEOPLE COULD HAVE NOTICED. OR THE OPPOSITE, BEING SO FIGETY OR RESTLESS THAT YOU HAVE BEEN MOVING AROUND A LOT MORE THAN USUAL: 0
4. FEELING TIRED OR HAVING LITTLE ENERGY: 3
10. IF YOU CHECKED OFF ANY PROBLEMS, HOW DIFFICULT HAVE THESE PROBLEMS MADE IT FOR YOU TO DO YOUR WORK, TAKE CARE OF THINGS AT HOME, OR GET ALONG WITH OTHER PEOPLE: 1
SUM OF ALL RESPONSES TO PHQ QUESTIONS 1-9: 15
1. LITTLE INTEREST OR PLEASURE IN DOING THINGS: 1
2. FEELING DOWN, DEPRESSED OR HOPELESS: 3
9. THOUGHTS THAT YOU WOULD BE BETTER OFF DEAD, OR OF HURTING YOURSELF: 1
3. TROUBLE FALLING OR STAYING ASLEEP: 3
SUM OF ALL RESPONSES TO PHQ QUESTIONS 1-9: 15
SUM OF ALL RESPONSES TO PHQ9 QUESTIONS 1 & 2: 4
6. FEELING BAD ABOUT YOURSELF - OR THAT YOU ARE A FAILURE OR HAVE LET YOURSELF OR YOUR FAMILY DOWN: 3

## 2023-02-03 ASSESSMENT — ENCOUNTER SYMPTOMS
COLOR CHANGE: 0
RHINORRHEA: 0
TROUBLE SWALLOWING: 0
BACK PAIN: 0
NAUSEA: 0
ABDOMINAL PAIN: 0
SINUS PRESSURE: 0
ABDOMINAL DISTENTION: 0
VOMITING: 0
COUGH: 1
CONSTIPATION: 0
ALLERGIC/IMMUNOLOGIC NEGATIVE: 1
BLOOD IN STOOL: 0
DIARRHEA: 0
SORE THROAT: 0

## 2023-02-03 ASSESSMENT — COLUMBIA-SUICIDE SEVERITY RATING SCALE - C-SSRS
2. HAVE YOU ACTUALLY HAD ANY THOUGHTS OF KILLING YOURSELF?: NO
1. WITHIN THE PAST MONTH, HAVE YOU WISHED YOU WERE DEAD OR WISHED YOU COULD GO TO SLEEP AND NOT WAKE UP?: YES
6. HAVE YOU EVER DONE ANYTHING, STARTED TO DO ANYTHING, OR PREPARED TO DO ANYTHING TO END YOUR LIFE?: NO

## 2023-02-03 NOTE — PROGRESS NOTES
Yana Ch presents today for regular follow up, thinks her Chronic Bronchitis and sinusitis are acting up    Current Outpatient Medications   Medication Sig Dispense Refill    cyclobenzaprine (FLEXERIL) 10 MG tablet Take 1 tablet by mouth in the morning, at noon, and at bedtime 90 tablet 2    levoFLOXacin (LEVAQUIN) 500 MG tablet Take 1 tablet by mouth daily for 10 days 10 tablet 0    traZODone (DESYREL) 50 MG tablet Take 1 tablet by mouth nightly 90 tablet 0    dicyclomine (BENTYL) 20 MG tablet TAKE 1 TABLET(20 MG) BY MOUTH THREE TIMES DAILY 90 tablet 0    metFORMIN (GLUCOPHAGE) 1000 MG tablet TAKE 1 TABLET(1000 MG) BY MOUTH TWICE DAILY WITH MEALS 180 tablet 0    rosuvastatin (CRESTOR) 20 MG tablet TAKE 1 TABLET(20 MG) BY MOUTH DAILY 90 tablet 0    spironolactone-hydroCHLOROthiazide (ALDACTAZIDE) 25-25 MG per tablet TAKE 1 TABLET BY MOUTH DAILY 90 tablet 0    famotidine (PEPCID) 40 MG tablet TAKE 1 TABLET BY MOUTH EVERY DAY 90 tablet 0    glipiZIDE (GLUCOTROL) 10 MG tablet TAKE 1 TABLET(10 MG) BY MOUTH TWICE DAILY 30 MINUTES BEFORE MEALS 180 tablet 0    gabapentin (NEURONTIN) 400 MG capsule TAKE 1 CAPSULE BY MOUTH THREE TIMES DAILY 270 capsule 0    lisinopril (PRINIVIL;ZESTRIL) 2.5 MG tablet TAKE 1 TABLET(2.5 MG) BY MOUTH DAILY 90 tablet 0    pantoprazole (PROTONIX) 40 MG tablet TAKE 1 TABLET(40 MG) BY MOUTH DAILY 90 tablet 0    topiramate (TOPAMAX) 50 MG tablet TAKE 1 TABLET BY MOUTH EVERY DAY 90 tablet 0    metoprolol tartrate (LOPRESSOR) 25 MG tablet TAKE 1 TABLET(25 MG) BY MOUTH THREE TIMES DAILY WITH FOOD 90 tablet 0    ipratropium (ATROVENT HFA) 17 MCG/ACT inhaler Inhale 2 puffs into the lungs every 4 hours as needed for Wheezing      ibuprofen (ADVIL;MOTRIN) 200 MG tablet Take 200 mg by mouth every 6 hours as needed for Pain      ammonium lactate (AMLACTIN) 12 % cream Apply topically as needed for Dry Skin Apply topically as needed.       buPROPion (WELLBUTRIN SR) 100 MG extended release tablet Take 100 mg by mouth 2 times daily Pt takes 1 tab in AM and 2 tabs at bedtime      metoprolol tartrate (LOPRESSOR) 25 MG tablet TAKE 1 TABLET(25 MG) BY MOUTH THREE TIMES DAILY WITH FOOD (Patient not taking: Reported on 2/3/2023) 270 tablet 0    butalbital-aspirin-caffeine (FIORINAL) -40 MG capsule Take 1 capsule by mouth every 8 hours as needed for Headaches. . (Patient not taking: No sig reported)      glipiZIDE (GLUCOTROL) 5 MG tablet Take 10 mg by mouth 2 times daily (before meals) (Patient not taking: Reported on 2/3/2023)      Acai Berry 500 MG CAPS Take 1,000 mg by mouth 2 times daily (Patient not taking: No sig reported)      dicyclomine (BENTYL) 10 MG capsule Take 10 mg by mouth 2 times daily (before meals)  (Patient not taking: Reported on 2/3/2023)      FLUoxetine (PROZAC) 20 MG capsule Take 60 mg by mouth daily (Patient not taking: No sig reported)      lidocaine (LIDODERM) 5 % Place 3 patches onto the skin daily 12 hours on, 12 hours off. (Patient not taking: Reported on 2/3/2023)      nortriptyline (PAMELOR) 10 MG capsule Take 10 mg by mouth nightly (Patient not taking: Reported on 2/3/2023)       No current facility-administered medications for this visit. Past Medical History:   Diagnosis Date    Anemia     COPD (chronic obstructive pulmonary disease) (HCC)     Diabetes mellitus (HCC)     Fibromyalgia     GERD (gastroesophageal reflux disease)     Hx of migraine headaches     Hyperlipidemia     Hypertension     Major depressive disorder     Polycythemia vera (HCC)     Raynaud disease           Subjective:  Coughing and postnasal drainage for a week. Low grade fever. Tired. Otherwise has been doing ok. Taking all meds. No chest pain or sob. Diabetes  Pertinent negatives for hypoglycemia include no dizziness, headaches, speech difficulty or tremors. Pertinent negatives for diabetes include no chest pain, no polydipsia, no polyuria and no weakness.     Review of Systems   Constitutional: Negative for activity change, appetite change and chills. HENT:  Positive for congestion. Negative for ear pain, mouth sores, postnasal drip, rhinorrhea, sinus pressure, sneezing, sore throat and trouble swallowing. Eyes:  Negative for visual disturbance. Respiratory:  Positive for cough. Cardiovascular:  Negative for chest pain, palpitations and leg swelling. Gastrointestinal:  Negative for abdominal distention, abdominal pain, blood in stool, constipation, diarrhea, nausea and vomiting. Endocrine: Negative for cold intolerance, heat intolerance, polydipsia and polyuria. Genitourinary:  Negative for difficulty urinating, dysuria, flank pain, frequency and urgency. Musculoskeletal:  Positive for myalgias. Negative for arthralgias, back pain, gait problem, neck pain and neck stiffness. Skin: Negative. Negative for color change. Allergic/Immunologic: Negative. Neurological:  Negative for dizziness, tremors, speech difficulty, weakness, light-headedness and headaches. Hematological: Negative. Psychiatric/Behavioral: Negative. Objective:  /68 (Site: Left Upper Arm, Position: Sitting, Cuff Size: Medium Adult)   Temp 97.1 °F (36.2 °C)   Ht 5' 3\" (1.6 m)   Wt 165 lb (74.8 kg)   BMI 29.23 kg/m²      Physical Exam  Vitals reviewed. Constitutional:       Comments: Coughing, no resp distress   HENT:      Head: Normocephalic. Right Ear: Tympanic membrane and external ear normal. There is no impacted cerumen. Left Ear: Tympanic membrane and external ear normal. There is no impacted cerumen. Nose: Nose normal.      Mouth/Throat:      Pharynx: Oropharynx is clear. No oropharyngeal exudate. Eyes:      Extraocular Movements: Extraocular movements intact. Conjunctiva/sclera: Conjunctivae normal.      Pupils: Pupils are equal, round, and reactive to light. Cardiovascular:      Rate and Rhythm: Normal rate and regular rhythm.       Heart sounds: No murmur heard.    No friction rub. No gallop. Pulmonary:      Effort: Pulmonary effort is normal.      Breath sounds: Normal breath sounds. Abdominal:      General: Bowel sounds are normal. There is no distension. Palpations: Abdomen is soft. There is no mass. Tenderness: There is no abdominal tenderness. There is no guarding or rebound. Musculoskeletal:         General: No swelling, tenderness or deformity. Normal range of motion. Cervical back: Normal range of motion and neck supple. No tenderness. Lymphadenopathy:      Cervical: No cervical adenopathy. Skin:     General: Skin is warm. Coloration: Skin is not pale. Findings: No rash. Neurological:      General: No focal deficit present. Mental Status: She is alert and oriented to person, place, and time. Assessment:  Michelle Carmen was seen today for diabetes. Diagnoses and all orders for this visit:    Essential hypertension  Comments:  controlled on med    Bronchitis  Comments:  Levaquin 500 qd for 10 days    Gastroesophageal reflux disease without esophagitis  Comments:  Great on med    Type 2 diabetes mellitus without complication, without long-term current use of insulin ([address]  Comments:  A1C up to 8.1, discussed dietary restricitons and compiance with meds. Repeat on next appt. Mixed hyperlipidemia  Comments:  Cont statin    Other orders  -     cyclobenzaprine (FLEXERIL) 10 MG tablet; Take 1 tablet by mouth in the morning, at noon, and at bedtime  -     levoFLOXacin (LEVAQUIN) 500 MG tablet;  Take 1 tablet by mouth daily for 10 days

## 2023-02-27 RX ORDER — DICYCLOMINE HCL 20 MG
TABLET ORAL
Qty: 90 TABLET | Refills: 0 | OUTPATIENT
Start: 2023-02-27

## 2023-02-27 RX ORDER — ROSUVASTATIN CALCIUM 20 MG/1
TABLET, COATED ORAL
Qty: 90 TABLET | Refills: 0 | OUTPATIENT
Start: 2023-02-27

## 2023-02-27 RX ORDER — SPIRONOLACTONE AND HYDROCHLOROTHIAZIDE 25; 25 MG/1; MG/1
TABLET ORAL
Qty: 90 TABLET | Refills: 0 | OUTPATIENT
Start: 2023-02-27

## 2023-02-27 RX ORDER — FAMOTIDINE 40 MG/1
TABLET, FILM COATED ORAL
Qty: 90 TABLET | Refills: 0 | OUTPATIENT
Start: 2023-02-27

## 2023-02-27 RX ORDER — GABAPENTIN 400 MG/1
CAPSULE ORAL
Qty: 270 CAPSULE | Refills: 0 | OUTPATIENT
Start: 2023-02-27 | End: 2023-05-28

## 2023-02-27 RX ORDER — GLIPIZIDE 10 MG/1
TABLET ORAL
Qty: 180 TABLET | Refills: 0 | OUTPATIENT
Start: 2023-02-27

## 2023-02-27 RX ORDER — LISINOPRIL 2.5 MG/1
TABLET ORAL
Qty: 90 TABLET | Refills: 0 | OUTPATIENT
Start: 2023-02-27

## 2023-02-27 RX ORDER — TOPIRAMATE 50 MG/1
TABLET, FILM COATED ORAL
Qty: 90 TABLET | Refills: 0 | OUTPATIENT
Start: 2023-02-27

## 2023-02-27 RX ORDER — PANTOPRAZOLE SODIUM 40 MG/1
TABLET, DELAYED RELEASE ORAL
Qty: 90 TABLET | Refills: 0 | OUTPATIENT
Start: 2023-02-27

## 2023-03-07 RX ORDER — BUPROPION HYDROCHLORIDE 100 MG/1
TABLET, EXTENDED RELEASE ORAL
Qty: 180 TABLET | Refills: 0 | Status: SHIPPED | OUTPATIENT
Start: 2023-03-07

## 2023-03-13 RX ORDER — LISINOPRIL 2.5 MG/1
2.5 TABLET ORAL DAILY
Qty: 90 TABLET | Refills: 0 | Status: SHIPPED | OUTPATIENT
Start: 2023-03-13

## 2023-03-13 RX ORDER — GABAPENTIN 400 MG/1
400 CAPSULE ORAL 3 TIMES DAILY
Qty: 270 CAPSULE | Refills: 0 | Status: SHIPPED | OUTPATIENT
Start: 2023-03-13 | End: 2023-06-11

## 2023-03-13 RX ORDER — ROSUVASTATIN CALCIUM 20 MG/1
20 TABLET, COATED ORAL DAILY
Qty: 90 TABLET | Refills: 0 | Status: SHIPPED | OUTPATIENT
Start: 2023-03-13

## 2023-03-15 RX ORDER — DICYCLOMINE HCL 20 MG
TABLET ORAL
Qty: 90 TABLET | Refills: 0 | Status: SHIPPED | OUTPATIENT
Start: 2023-03-15

## 2023-04-04 RX ORDER — PANTOPRAZOLE SODIUM 40 MG/1
TABLET, DELAYED RELEASE ORAL
Qty: 90 TABLET | Refills: 0 | Status: SHIPPED | OUTPATIENT
Start: 2023-04-04

## 2023-04-04 RX ORDER — SPIRONOLACTONE AND HYDROCHLOROTHIAZIDE 25; 25 MG/1; MG/1
1 TABLET ORAL DAILY
Qty: 90 TABLET | Refills: 0 | Status: SHIPPED | OUTPATIENT
Start: 2023-04-04

## 2023-04-04 RX ORDER — GLIPIZIDE 10 MG/1
TABLET ORAL
Qty: 180 TABLET | Refills: 0 | Status: SHIPPED | OUTPATIENT
Start: 2023-04-04

## 2023-04-04 NOTE — TELEPHONE ENCOUNTER
Requested refills on  Pantoprazole 40  Glipizide 10  Spironolactone 25-25  [facility]

## 2023-04-08 DIAGNOSIS — F51.01 PRIMARY INSOMNIA: ICD-10-CM

## 2023-04-10 RX ORDER — DICYCLOMINE HCL 20 MG
TABLET ORAL
Qty: 90 TABLET | Refills: 0 | OUTPATIENT
Start: 2023-04-10

## 2023-04-10 RX ORDER — TRAZODONE HYDROCHLORIDE 50 MG/1
TABLET ORAL
Qty: 90 TABLET | Refills: 0 | OUTPATIENT
Start: 2023-04-10

## 2023-04-18 RX ORDER — DICYCLOMINE HCL 20 MG
TABLET ORAL
Qty: 90 TABLET | Refills: 0 | Status: SHIPPED | OUTPATIENT
Start: 2023-04-18

## 2023-04-18 RX ORDER — FAMOTIDINE 40 MG/1
TABLET, FILM COATED ORAL
Qty: 90 TABLET | Refills: 0 | Status: SHIPPED | OUTPATIENT
Start: 2023-04-18

## 2023-04-18 NOTE — TELEPHONE ENCOUNTER
Patient is requesting refills be sent into [facility] on [address] for the following meds. dicyclomine (BENTYL) 20 MG tablet and cyclobenzaprine (FLEXERIL) 10 MG tablet . Thank you.

## 2023-05-15 RX ORDER — CYCLOBENZAPRINE HCL 10 MG
10 TABLET ORAL 3 TIMES DAILY
Qty: 60 TABLET | Refills: 2 | Status: SHIPPED | OUTPATIENT
Start: 2023-05-15

## 2023-05-19 RX ORDER — DICYCLOMINE HCL 20 MG
TABLET ORAL
Qty: 90 TABLET | Refills: 2 | Status: SHIPPED | OUTPATIENT
Start: 2023-05-19

## 2023-05-24 ENCOUNTER — OFFICE VISIT (OUTPATIENT)
Dept: PRIMARY CARE CLINIC | Age: 68
End: 2023-05-24
Payer: MEDICARE

## 2023-05-24 VITALS
SYSTOLIC BLOOD PRESSURE: 102 MMHG | WEIGHT: 165 LBS | BODY MASS INDEX: 29.23 KG/M2 | HEART RATE: 77 BPM | OXYGEN SATURATION: 97 % | TEMPERATURE: 97.3 F | HEIGHT: 63 IN | DIASTOLIC BLOOD PRESSURE: 52 MMHG

## 2023-05-24 DIAGNOSIS — E11.9 TYPE 2 DIABETES MELLITUS WITHOUT COMPLICATION, WITHOUT LONG-TERM CURRENT USE OF INSULIN (HCC): ICD-10-CM

## 2023-05-24 DIAGNOSIS — Z00.00 INITIAL MEDICARE ANNUAL WELLNESS VISIT: Primary | ICD-10-CM

## 2023-05-24 DIAGNOSIS — I10 ESSENTIAL HYPERTENSION: ICD-10-CM

## 2023-05-24 DIAGNOSIS — K21.9 GASTROESOPHAGEAL REFLUX DISEASE WITHOUT ESOPHAGITIS: ICD-10-CM

## 2023-05-24 DIAGNOSIS — E78.5 SERUM LIPIDS HIGH: ICD-10-CM

## 2023-05-24 DIAGNOSIS — M79.7 FIBROMYALGIA: ICD-10-CM

## 2023-05-24 LAB
CHP ED QC CHECK: ABNORMAL
GLUCOSE BLD-MCNC: 172 MG/DL

## 2023-05-24 PROCEDURE — 3078F DIAST BP <80 MM HG: CPT | Performed by: INTERNAL MEDICINE

## 2023-05-24 PROCEDURE — 82962 GLUCOSE BLOOD TEST: CPT | Performed by: INTERNAL MEDICINE

## 2023-05-24 PROCEDURE — 3074F SYST BP LT 130 MM HG: CPT | Performed by: INTERNAL MEDICINE

## 2023-05-24 PROCEDURE — G0438 PPPS, INITIAL VISIT: HCPCS | Performed by: INTERNAL MEDICINE

## 2023-05-24 PROCEDURE — 1123F ACP DISCUSS/DSCN MKR DOCD: CPT | Performed by: INTERNAL MEDICINE

## 2023-05-24 PROCEDURE — 99213 OFFICE O/P EST LOW 20 MIN: CPT | Performed by: INTERNAL MEDICINE

## 2023-05-24 RX ORDER — CYCLOBENZAPRINE HCL 10 MG
10 TABLET ORAL 3 TIMES DAILY
Qty: 270 TABLET | Refills: 0 | Status: SHIPPED | OUTPATIENT
Start: 2023-05-24

## 2023-05-24 RX ORDER — GABAPENTIN 400 MG/1
400 CAPSULE ORAL 3 TIMES DAILY
Qty: 270 CAPSULE | Refills: 0 | Status: SHIPPED | OUTPATIENT
Start: 2023-05-24 | End: 2023-08-22

## 2023-05-24 RX ORDER — CYCLOBENZAPRINE HCL 10 MG
10 TABLET ORAL 3 TIMES DAILY
Qty: 270 TABLET | Refills: 0 | Status: CANCELLED | OUTPATIENT
Start: 2023-05-24

## 2023-05-24 ASSESSMENT — PATIENT HEALTH QUESTIONNAIRE - PHQ9
SUM OF ALL RESPONSES TO PHQ QUESTIONS 1-9: 16
8. MOVING OR SPEAKING SO SLOWLY THAT OTHER PEOPLE COULD HAVE NOTICED. OR THE OPPOSITE, BEING SO FIGETY OR RESTLESS THAT YOU HAVE BEEN MOVING AROUND A LOT MORE THAN USUAL: 0
SUM OF ALL RESPONSES TO PHQ QUESTIONS 1-9: 16
6. FEELING BAD ABOUT YOURSELF - OR THAT YOU ARE A FAILURE OR HAVE LET YOURSELF OR YOUR FAMILY DOWN: 3
SUM OF ALL RESPONSES TO PHQ QUESTIONS 1-9: 16
5. POOR APPETITE OR OVEREATING: 0
3. TROUBLE FALLING OR STAYING ASLEEP: 3
SUM OF ALL RESPONSES TO PHQ9 QUESTIONS 1 & 2: 5
2. FEELING DOWN, DEPRESSED OR HOPELESS: 3
1. LITTLE INTEREST OR PLEASURE IN DOING THINGS: 2
SUM OF ALL RESPONSES TO PHQ QUESTIONS 1-9: 16
7. TROUBLE CONCENTRATING ON THINGS, SUCH AS READING THE NEWSPAPER OR WATCHING TELEVISION: 2
4. FEELING TIRED OR HAVING LITTLE ENERGY: 3
9. THOUGHTS THAT YOU WOULD BE BETTER OFF DEAD, OR OF HURTING YOURSELF: 0
10. IF YOU CHECKED OFF ANY PROBLEMS, HOW DIFFICULT HAVE THESE PROBLEMS MADE IT FOR YOU TO DO YOUR WORK, TAKE CARE OF THINGS AT HOME, OR GET ALONG WITH OTHER PEOPLE: 2

## 2023-05-24 ASSESSMENT — LIFESTYLE VARIABLES
HOW MANY STANDARD DRINKS CONTAINING ALCOHOL DO YOU HAVE ON A TYPICAL DAY: PATIENT DOES NOT DRINK
HOW OFTEN DO YOU HAVE A DRINK CONTAINING ALCOHOL: NEVER

## 2023-05-24 NOTE — PATIENT INSTRUCTIONS
especially good for you. Examples include spinach, carrots, peaches, and berries.     Foods high in fiber can reduce your cholesterol and provide important vitamins and minerals. High-fiber foods include whole-grain cereals and breads, oatmeal, beans, brown rice, citrus fruits, and apples.     Eat lean proteins. Heart-healthy proteins include seafood, lean meats and poultry, eggs, beans, peas, nuts, seeds, and soy products.     Limit drinks and foods with added sugar. These include candy, desserts, and soda pop. Lifestyle changes    If your doctor recommends it, get more exercise. Walking is a good choice. Bit by bit, increase the amount you walk every day. Try for at least 30 minutes on most days of the week. You also may want to swim, bike, or do other activities.     Do not smoke. If you need help quitting, talk to your doctor about stop-smoking programs and medicines. These can increase your chances of quitting for good. Quitting smoking may be the most important step you can take to protect your heart. It is never too late to quit.     Limit alcohol to 2 drinks a day for men and 1 drink a day for women. Too much alcohol can cause health problems.     Manage other health problems such as diabetes, high blood pressure, and high cholesterol. If you think you may have a problem with alcohol or drug use, talk to your doctor. Medicines    Take your medicines exactly as prescribed. Call your doctor if you think you are having a problem with your medicine.     If your doctor recommends aspirin, take the amount directed each day. Make sure you take aspirin and not another kind of pain reliever, such as acetaminophen (Tylenol). When should you call for help? Call 911 if you have symptoms of a heart attack.  These may include:    Chest pain or pressure, or a strange feeling in the chest.     Sweating.     Shortness of breath.     Pain, pressure, or a strange feeling in the back, neck, jaw, or upper belly or in one

## 2023-05-24 NOTE — PROGRESS NOTES
= minimal  5-9 = mild  10-14 = moderate  15-19 = moderately severe  20-27 = severe  Interventions:  Patient comments: Takes medication, works for her          General HRA Questions:  Select all that apply: (!) Stress, New or Increased Pain    Pain Interventions:  Patient comments: Takes meds and stretches, always active    Stress Interventions:  Patient comments: Just prays a lot       Weight and Activity:  Physical Activity: Inactive    Days of Exercise per Week: 0 days    Minutes of Exercise per Session: 0 min     On average, how many days per week do you engage in moderate to strenuous exercise (like a brisk walk)?: 0 days  Have you lost any weight without trying in the past 3 months?: No  Body mass index is 29.23 kg/m². Inactivity Interventions:  Patient comments: Works a lot on stretching, does a lot at home       Hearing Screen:  Do you or your family notice any trouble with your hearing that hasn't been managed with hearing aids?: (!) Yes    Interventions:  Patient comments: has tinnitus, but overall hear pretty well       Advanced Directives:  Do you have a Living Will?: (!) No    Intervention:          Tobacco Use:  Tobacco Use: High Risk    Smoking Tobacco Use: Every Day    Smokeless Tobacco Use: Never    Passive Exposure: Never     E-cigarette/Vaping       Questions Responses    E-cigarette/Vaping Use     Start Date     Passive Exposure     Quit Date     Counseling Given     Comments           Interventions:  Patient comments: will try to slow down                        Objective   Vitals:    05/24/23 1539   BP: (!) 102/52   Site: Left Upper Arm   Position: Sitting   Cuff Size: Medium Adult   Pulse: 77   Temp: 97.3 °F (36.3 °C)   SpO2: 97%   Weight: 165 lb (74.8 kg)   Height: 5' 3\" (1.6 m)      Body mass index is 29.23 kg/m². No Known Allergies  Prior to Visit Medications    Medication Sig Taking?  Authorizing Provider   gabapentin (NEURONTIN) 400 MG capsule Take 1 capsule by mouth 3

## 2023-05-25 ASSESSMENT — ENCOUNTER SYMPTOMS
VOMITING: 0
TROUBLE SWALLOWING: 0
SINUS PRESSURE: 0
COLOR CHANGE: 0
ALLERGIC/IMMUNOLOGIC NEGATIVE: 1
ABDOMINAL PAIN: 0
SORE THROAT: 0
ABDOMINAL DISTENTION: 0
RHINORRHEA: 0
CONSTIPATION: 0
DIARRHEA: 0
BACK PAIN: 1
NAUSEA: 0
BLOOD IN STOOL: 0

## 2023-05-30 RX ORDER — BUPROPION HYDROCHLORIDE 100 MG/1
TABLET, EXTENDED RELEASE ORAL
Qty: 180 TABLET | Refills: 0 | OUTPATIENT
Start: 2023-05-30

## 2023-05-30 RX ORDER — ROSUVASTATIN CALCIUM 20 MG/1
20 TABLET, COATED ORAL DAILY
Qty: 90 TABLET | Refills: 0 | OUTPATIENT
Start: 2023-05-30

## 2023-05-30 RX ORDER — LISINOPRIL 2.5 MG/1
2.5 TABLET ORAL DAILY
Qty: 90 TABLET | Refills: 0 | OUTPATIENT
Start: 2023-05-30

## 2023-06-29 RX ORDER — FAMOTIDINE 40 MG/1
TABLET, FILM COATED ORAL
Qty: 90 TABLET | Refills: 0 | Status: SHIPPED | OUTPATIENT
Start: 2023-06-29

## 2023-06-29 RX ORDER — GLIPIZIDE 10 MG/1
TABLET ORAL
Qty: 180 TABLET | Refills: 0 | Status: SHIPPED | OUTPATIENT
Start: 2023-06-29

## 2023-06-29 RX ORDER — SPIRONOLACTONE AND HYDROCHLOROTHIAZIDE 25; 25 MG/1; MG/1
1 TABLET ORAL DAILY
Qty: 90 TABLET | Refills: 0 | Status: SHIPPED | OUTPATIENT
Start: 2023-06-29

## 2023-06-29 RX ORDER — PANTOPRAZOLE SODIUM 40 MG/1
TABLET, DELAYED RELEASE ORAL
Qty: 90 TABLET | Refills: 0 | Status: SHIPPED | OUTPATIENT
Start: 2023-06-29

## 2023-07-11 RX ORDER — BUPROPION HYDROCHLORIDE 100 MG/1
TABLET, EXTENDED RELEASE ORAL
Qty: 180 TABLET | Refills: 0 | Status: SHIPPED | OUTPATIENT
Start: 2023-07-11

## 2023-07-22 RX ORDER — CYCLOBENZAPRINE HCL 10 MG
10 TABLET ORAL 3 TIMES DAILY
Qty: 270 TABLET | Refills: 0 | OUTPATIENT
Start: 2023-07-22

## 2023-07-31 RX ORDER — DICYCLOMINE HCL 20 MG
TABLET ORAL
Qty: 90 TABLET | Refills: 2 | Status: SHIPPED | OUTPATIENT
Start: 2023-07-31

## 2023-07-31 RX ORDER — CYCLOBENZAPRINE HCL 10 MG
10 TABLET ORAL 3 TIMES DAILY
Qty: 270 TABLET | Refills: 0 | OUTPATIENT
Start: 2023-07-31

## 2023-08-04 RX ORDER — CYCLOBENZAPRINE HCL 10 MG
10 TABLET ORAL 3 TIMES DAILY
Qty: 270 TABLET | Refills: 0 | OUTPATIENT
Start: 2023-08-04

## 2023-08-14 RX ORDER — GABAPENTIN 400 MG/1
CAPSULE ORAL
Qty: 270 CAPSULE | Refills: 0 | OUTPATIENT
Start: 2023-08-14

## 2023-08-22 RX ORDER — CYCLOBENZAPRINE HCL 10 MG
10 TABLET ORAL 3 TIMES DAILY
Qty: 270 TABLET | Refills: 0 | OUTPATIENT
Start: 2023-08-22

## 2023-09-01 RX ORDER — ROSUVASTATIN CALCIUM 20 MG/1
20 TABLET, COATED ORAL DAILY
Qty: 90 TABLET | Refills: 0 | Status: SHIPPED | OUTPATIENT
Start: 2023-09-01

## 2023-09-01 RX ORDER — LISINOPRIL 2.5 MG/1
2.5 TABLET ORAL DAILY
Qty: 90 TABLET | Refills: 0 | Status: SHIPPED | OUTPATIENT
Start: 2023-09-01

## 2023-09-05 RX ORDER — GABAPENTIN 400 MG/1
CAPSULE ORAL
Qty: 270 CAPSULE | Refills: 0 | OUTPATIENT
Start: 2023-09-05

## 2023-09-12 RX ORDER — CYCLOBENZAPRINE HCL 10 MG
10 TABLET ORAL 3 TIMES DAILY
Qty: 270 TABLET | Refills: 0 | Status: SHIPPED | OUTPATIENT
Start: 2023-09-12

## 2023-09-26 ENCOUNTER — OFFICE VISIT (OUTPATIENT)
Dept: PRIMARY CARE CLINIC | Age: 68
End: 2023-09-26

## 2023-09-26 VITALS
TEMPERATURE: 97.4 F | OXYGEN SATURATION: 95 % | DIASTOLIC BLOOD PRESSURE: 67 MMHG | BODY MASS INDEX: 28.35 KG/M2 | SYSTOLIC BLOOD PRESSURE: 110 MMHG | WEIGHT: 160 LBS | HEART RATE: 83 BPM | HEIGHT: 63 IN

## 2023-09-26 DIAGNOSIS — E78.5 SERUM LIPIDS HIGH: ICD-10-CM

## 2023-09-26 DIAGNOSIS — I10 ESSENTIAL HYPERTENSION: ICD-10-CM

## 2023-09-26 DIAGNOSIS — E11.9 TYPE 2 DIABETES MELLITUS WITHOUT COMPLICATION, WITHOUT LONG-TERM CURRENT USE OF INSULIN (HCC): Primary | ICD-10-CM

## 2023-09-26 DIAGNOSIS — Z86.69 HX OF MIGRAINE HEADACHES: ICD-10-CM

## 2023-09-26 DIAGNOSIS — M79.7 FIBROMYALGIA: ICD-10-CM

## 2023-09-26 LAB — HBA1C MFR BLD: 7.1 %

## 2023-09-26 RX ORDER — SPIRONOLACTONE AND HYDROCHLOROTHIAZIDE 25; 25 MG/1; MG/1
1 TABLET ORAL DAILY
Qty: 90 TABLET | Refills: 0 | Status: SHIPPED | OUTPATIENT
Start: 2023-09-26

## 2023-09-26 RX ORDER — PANTOPRAZOLE SODIUM 40 MG/1
TABLET, DELAYED RELEASE ORAL
Qty: 90 TABLET | Refills: 0 | Status: SHIPPED
Start: 2023-09-26 | End: 2023-09-29

## 2023-09-26 RX ORDER — TOPIRAMATE 50 MG/1
50 TABLET, FILM COATED ORAL DAILY
Qty: 90 TABLET | Refills: 0 | Status: SHIPPED | OUTPATIENT
Start: 2023-09-26

## 2023-09-26 RX ORDER — GLIPIZIDE 10 MG/1
TABLET ORAL
Qty: 180 TABLET | Refills: 0 | Status: SHIPPED
Start: 2023-09-26 | End: 2023-09-29

## 2023-09-26 RX ORDER — GABAPENTIN 400 MG/1
400 CAPSULE ORAL 3 TIMES DAILY
Qty: 270 CAPSULE | Refills: 0 | Status: SHIPPED | OUTPATIENT
Start: 2023-09-26 | End: 2023-12-25

## 2023-09-29 RX ORDER — BUPROPION HYDROCHLORIDE 100 MG/1
TABLET, EXTENDED RELEASE ORAL
Qty: 180 TABLET | Refills: 0 | Status: SHIPPED | OUTPATIENT
Start: 2023-09-29

## 2023-09-29 RX ORDER — GLIPIZIDE 10 MG/1
TABLET ORAL
Qty: 180 TABLET | Refills: 0 | Status: SHIPPED | OUTPATIENT
Start: 2023-09-29

## 2023-09-29 RX ORDER — FAMOTIDINE 40 MG/1
TABLET, FILM COATED ORAL
Qty: 90 TABLET | Refills: 0 | Status: SHIPPED | OUTPATIENT
Start: 2023-09-29

## 2023-09-29 RX ORDER — PANTOPRAZOLE SODIUM 40 MG/1
TABLET, DELAYED RELEASE ORAL
Qty: 90 TABLET | Refills: 0 | Status: SHIPPED | OUTPATIENT
Start: 2023-09-29

## 2023-10-02 PROBLEM — E78.5 HYPERLIPIDEMIA: Status: ACTIVE | Noted: 2019-02-01

## 2023-10-02 ASSESSMENT — ENCOUNTER SYMPTOMS
NAUSEA: 0
SORE THROAT: 0
ABDOMINAL PAIN: 0
TROUBLE SWALLOWING: 0
CONSTIPATION: 0
ABDOMINAL DISTENTION: 0
VOMITING: 0
BLOOD IN STOOL: 0
COLOR CHANGE: 0
RHINORRHEA: 0
DIARRHEA: 0
ALLERGIC/IMMUNOLOGIC NEGATIVE: 1
SINUS PRESSURE: 0
BACK PAIN: 0

## 2023-10-11 ENCOUNTER — APPOINTMENT (OUTPATIENT)
Dept: CT IMAGING | Age: 68
DRG: 481 | End: 2023-10-11
Payer: MEDICARE

## 2023-10-11 ENCOUNTER — APPOINTMENT (OUTPATIENT)
Dept: GENERAL RADIOLOGY | Age: 68
End: 2023-10-11
Payer: MEDICARE

## 2023-10-11 ENCOUNTER — HOSPITAL ENCOUNTER (INPATIENT)
Age: 68
LOS: 5 days | Discharge: NURSING, REHAB OR LONG-TERM CARE | DRG: 481 | End: 2023-10-17
Attending: EMERGENCY MEDICINE | Admitting: INTERNAL MEDICINE
Payer: MEDICARE

## 2023-10-11 ENCOUNTER — HOSPITAL ENCOUNTER (EMERGENCY)
Age: 68
Discharge: ANOTHER HOSPITAL | End: 2023-10-11
Attending: EMERGENCY MEDICINE
Payer: MEDICARE

## 2023-10-11 VITALS
OXYGEN SATURATION: 97 % | WEIGHT: 160 LBS | SYSTOLIC BLOOD PRESSURE: 120 MMHG | HEIGHT: 63 IN | RESPIRATION RATE: 17 BRPM | TEMPERATURE: 97.8 F | BODY MASS INDEX: 28.35 KG/M2 | DIASTOLIC BLOOD PRESSURE: 102 MMHG | HEART RATE: 115 BPM

## 2023-10-11 DIAGNOSIS — S72.91XA CLOSED FRACTURE OF RIGHT FEMUR, UNSPECIFIED FRACTURE MORPHOLOGY, UNSPECIFIED PORTION OF FEMUR, INITIAL ENCOUNTER (HCC): ICD-10-CM

## 2023-10-11 DIAGNOSIS — S72.321A CLOSED DISPLACED TRANSVERSE FRACTURE OF SHAFT OF RIGHT FEMUR, INITIAL ENCOUNTER (HCC): Primary | ICD-10-CM
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
## 2023-10-11 LAB
ABO + RH BLD: NORMAL
ANION GAP SERPL CALCULATED.3IONS-SCNC: 14 MMOL/L (ref 7–16)
ARM BAND NUMBER: NORMAL
BASOPHILS # BLD: 0.08 K/UL (ref 0–0.2)
BASOPHILS NFR BLD: 0 % (ref 0–2)
BLOOD BANK SAMPLE EXPIRATION: NORMAL
BLOOD GROUP ANTIBODIES SERPL: NEGATIVE
BUN SERPL-MCNC: 26 MG/DL (ref 6–23)
CALCIUM SERPL-MCNC: 9.4 MG/DL (ref 8.6–10.2)
CHLORIDE SERPL-SCNC: 96 MMOL/L (ref 98–107)
CO2 SERPL-SCNC: 24 MMOL/L (ref 22–29)
CREAT SERPL-MCNC: 0.6 MG/DL (ref 0.5–1)
EOSINOPHIL # BLD: 0.09 K/UL (ref 0.05–0.5)
EOSINOPHILS RELATIVE PERCENT: 1 % (ref 0–6)
ERYTHROCYTE [DISTWIDTH] IN BLOOD BY AUTOMATED COUNT: 13.3 % (ref 11.5–15)
GFR SERPL CREATININE-BSD FRML MDRD: >60 ML/MIN/1.73M2
GLUCOSE SERPL-MCNC: 256 MG/DL (ref 74–99)
HCT VFR BLD AUTO: 41.9 % (ref 34–48)
HGB BLD-MCNC: 13.5 G/DL (ref 11.5–15.5)
IMM GRANULOCYTES # BLD AUTO: 0.12 K/UL (ref 0–0.58)
IMM GRANULOCYTES NFR BLD: 1 % (ref 0–5)
INR PPP: 1
LYMPHOCYTES NFR BLD: 1.22 K/UL (ref 1.5–4)
LYMPHOCYTES RELATIVE PERCENT: 7 % (ref 20–42)
MCH RBC QN AUTO: 30.1 PG (ref 26–35)
MCHC RBC AUTO-ENTMCNC: 32.2 G/DL (ref 32–34.5)
MCV RBC AUTO: 93.5 FL (ref 80–99.9)
MONOCYTES NFR BLD: 0.62 K/UL (ref 0.1–0.95)
MONOCYTES NFR BLD: 3 % (ref 2–12)
NEUTROPHILS NFR BLD: 89 % (ref 43–80)
NEUTS SEG NFR BLD: 16.59 K/UL (ref 1.8–7.3)
PARTIAL THROMBOPLASTIN TIME: 25.8 SEC (ref 24.5–35.1)
PLATELET # BLD AUTO: 276 K/UL (ref 130–450)
PMV BLD AUTO: 10.7 FL (ref 7–12)
POTASSIUM SERPL-SCNC: 4.2 MMOL/L (ref 3.5–5)
PROTHROMBIN TIME: 10.8 SEC (ref 9.3–12.4)
RBC # BLD AUTO: 4.48 M/UL (ref 3.5–5.5)
SODIUM SERPL-SCNC: 134 MMOL/L (ref 132–146)
WBC OTHER # BLD: 18.7 K/UL (ref 4.5–11.5)

## 2023-10-11 PROCEDURE — 85025 COMPLETE CBC W/AUTO DIFF WBC: CPT

## 2023-10-11 PROCEDURE — 6360000002 HC RX W HCPCS: Performed by: EMERGENCY MEDICINE

## 2023-10-11 PROCEDURE — 85610 PROTHROMBIN TIME: CPT

## 2023-10-11 PROCEDURE — 99285 EMERGENCY DEPT VISIT HI MDM: CPT

## 2023-10-11 PROCEDURE — 86850 RBC ANTIBODY SCREEN: CPT

## 2023-10-11 PROCEDURE — 96376 TX/PRO/DX INJ SAME DRUG ADON: CPT

## 2023-10-11 PROCEDURE — 96374 THER/PROPH/DIAG INJ IV PUSH: CPT

## 2023-10-11 PROCEDURE — 86900 BLOOD TYPING SEROLOGIC ABO: CPT

## 2023-10-11 PROCEDURE — 85730 THROMBOPLASTIN TIME PARTIAL: CPT

## 2023-10-11 PROCEDURE — 71045 X-RAY EXAM CHEST 1 VIEW: CPT

## 2023-10-11 PROCEDURE — 96372 THER/PROPH/DIAG INJ SC/IM: CPT

## 2023-10-11 PROCEDURE — 86901 BLOOD TYPING SEROLOGIC RH(D): CPT

## 2023-10-11 PROCEDURE — 76376 3D RENDER W/INTRP POSTPROCES: CPT

## 2023-10-11 PROCEDURE — 6360000002 HC RX W HCPCS

## 2023-10-11 PROCEDURE — 2580000003 HC RX 258

## 2023-10-11 PROCEDURE — 36415 COLL VENOUS BLD VENIPUNCTURE: CPT

## 2023-10-11 PROCEDURE — 73552 X-RAY EXAM OF FEMUR 2/>: CPT

## 2023-10-11 PROCEDURE — 73502 X-RAY EXAM HIP UNI 2-3 VIEWS: CPT

## 2023-10-11 PROCEDURE — 80048 BASIC METABOLIC PNL TOTAL CA: CPT

## 2023-10-11 PROCEDURE — 73700 CT LOWER EXTREMITY W/O DYE: CPT

## 2023-10-11 RX ORDER — 0.9 % SODIUM CHLORIDE 0.9 %
1000 INTRAVENOUS SOLUTION INTRAVENOUS ONCE
Status: DISCONTINUED | OUTPATIENT
Start: 2023-10-11 | End: 2023-10-11

## 2023-10-11 RX ORDER — MORPHINE SULFATE 4 MG/ML
4 INJECTION, SOLUTION INTRAMUSCULAR; INTRAVENOUS ONCE
Status: COMPLETED | OUTPATIENT
Start: 2023-10-11 | End: 2023-10-11

## 2023-10-11 RX ORDER — FENTANYL CITRATE 50 UG/ML
25 INJECTION, SOLUTION INTRAMUSCULAR; INTRAVENOUS ONCE
Status: COMPLETED | OUTPATIENT
Start: 2023-10-11 | End: 2023-10-11

## 2023-10-11 RX ORDER — ORPHENADRINE CITRATE 30 MG/ML
60 INJECTION INTRAMUSCULAR; INTRAVENOUS ONCE
Status: COMPLETED | OUTPATIENT
Start: 2023-10-11 | End: 2023-10-11

## 2023-10-11 RX ORDER — FENTANYL CITRATE 50 UG/ML
INJECTION, SOLUTION INTRAMUSCULAR; INTRAVENOUS
Status: COMPLETED
Start: 2023-10-11 | End: 2023-10-11

## 2023-10-11 RX ORDER — FENTANYL CITRATE 50 UG/ML
50 INJECTION, SOLUTION INTRAMUSCULAR; INTRAVENOUS ONCE
Status: COMPLETED | OUTPATIENT
Start: 2023-10-11 | End: 2023-10-11

## 2023-10-11 RX ORDER — 0.9 % SODIUM CHLORIDE 0.9 %
500 INTRAVENOUS SOLUTION INTRAVENOUS ONCE
Status: COMPLETED | OUTPATIENT
Start: 2023-10-11 | End: 2023-10-11

## 2023-10-11 RX ADMIN — FENTANYL CITRATE 25 MCG: 50 INJECTION, SOLUTION INTRAMUSCULAR; INTRAVENOUS at 15:41

## 2023-10-11 RX ADMIN — FENTANYL CITRATE 50 MCG: 50 INJECTION, SOLUTION INTRAMUSCULAR; INTRAVENOUS at 21:26

## 2023-10-11 RX ADMIN — FENTANYL CITRATE 50 MCG: 50 INJECTION, SOLUTION INTRAMUSCULAR; INTRAVENOUS at 18:29

## 2023-10-11 RX ADMIN — ORPHENADRINE CITRATE 60 MG: 60 INJECTION INTRAMUSCULAR; INTRAVENOUS at 19:52

## 2023-10-11 RX ADMIN — ORPHENADRINE CITRATE 60 MG: 60 INJECTION INTRAMUSCULAR; INTRAVENOUS at 23:02

## 2023-10-11 RX ADMIN — SODIUM CHLORIDE 500 ML: 9 INJECTION, SOLUTION INTRAVENOUS at 23:03

## 2023-10-11 RX ADMIN — MORPHINE SULFATE 4 MG: 4 INJECTION, SOLUTION INTRAMUSCULAR; INTRAVENOUS at 23:02

## 2023-10-11 RX ADMIN — FENTANYL CITRATE 50 MCG: 50 INJECTION INTRAMUSCULAR; INTRAVENOUS at 21:26

## 2023-10-11 ASSESSMENT — PAIN DESCRIPTION - PAIN TYPE: TYPE: ACUTE PAIN

## 2023-10-11 ASSESSMENT — PAIN SCALES - GENERAL
PAINLEVEL_OUTOF10: 9
PAINLEVEL_OUTOF10: 9
PAINLEVEL_OUTOF10: 8
PAINLEVEL_OUTOF10: 9

## 2023-10-11 ASSESSMENT — PAIN DESCRIPTION - FREQUENCY: FREQUENCY: CONTINUOUS

## 2023-10-11 ASSESSMENT — PAIN DESCRIPTION - ONSET: ONSET: ON-GOING

## 2023-10-11 ASSESSMENT — PAIN DESCRIPTION - ORIENTATION
ORIENTATION: RIGHT

## 2023-10-11 ASSESSMENT — PAIN - FUNCTIONAL ASSESSMENT: PAIN_FUNCTIONAL_ASSESSMENT: 0-10

## 2023-10-11 ASSESSMENT — LIFESTYLE VARIABLES
HOW OFTEN DO YOU HAVE A DRINK CONTAINING ALCOHOL: NEVER
HOW MANY STANDARD DRINKS CONTAINING ALCOHOL DO YOU HAVE ON A TYPICAL DAY: PATIENT DOES NOT DRINK

## 2023-10-11 ASSESSMENT — PAIN DESCRIPTION - LOCATION
LOCATION: LEG

## 2023-10-11 ASSESSMENT — PAIN DESCRIPTION - DESCRIPTORS: DESCRIPTORS: DISCOMFORT

## 2023-10-11 NOTE — PROGRESS NOTES
@5825 access center notified of transfer to [facility] spoke with Dr. Navid Gaitan ortho requesting transfer  @8815 access center notified to connect with ED for transfer  @0315 Dr Roxanna Patino spoke with Dr Baron/ accepting ER physician  @4760 PA ETA 2-3 hrs @ 8165

## 2023-10-12 ENCOUNTER — APPOINTMENT (OUTPATIENT)
Dept: GENERAL RADIOLOGY | Age: 68
DRG: 481 | End: 2023-10-12
Payer: MEDICARE

## 2023-10-12 ENCOUNTER — ANESTHESIA (OUTPATIENT)
Dept: OPERATING ROOM | Age: 68
End: 2023-10-12
Payer: MEDICARE

## 2023-10-12 ENCOUNTER — ANESTHESIA EVENT (OUTPATIENT)
Dept: OPERATING ROOM | Age: 68
End: 2023-10-12
Payer: MEDICARE

## 2023-10-12 PROBLEM — S72.421A CLOSED BICONDYLAR FRACTURE OF DISTAL FEMUR, RIGHT, INITIAL ENCOUNTER (HCC): Status: ACTIVE | Noted: 2023-10-12

## 2023-10-12 PROBLEM — S72.431A CLOSED BICONDYLAR FRACTURE OF DISTAL FEMUR, RIGHT, INITIAL ENCOUNTER (HCC): Status: ACTIVE | Noted: 2023-10-12
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
## 2023-10-12 LAB
ABO + RH BLD: NORMAL
ARM BAND NUMBER: NORMAL
BLOOD BANK SAMPLE EXPIRATION: NORMAL
BLOOD GROUP ANTIBODIES SERPL: NEGATIVE
ERYTHROCYTE [DISTWIDTH] IN BLOOD BY AUTOMATED COUNT: 13.3 % (ref 11.5–15)
ERYTHROCYTE [DISTWIDTH] IN BLOOD BY AUTOMATED COUNT: 13.4 % (ref 11.5–15)
GLUCOSE BLD-MCNC: 232 MG/DL (ref 74–99)
GLUCOSE BLD-MCNC: 257 MG/DL (ref 74–99)
GLUCOSE BLD-MCNC: 319 MG/DL (ref 74–99)
HCT VFR BLD AUTO: 38.4 % (ref 34–48)
HCT VFR BLD AUTO: 41.6 % (ref 34–48)
HGB BLD-MCNC: 12.5 G/DL (ref 11.5–15.5)
HGB BLD-MCNC: 13.6 G/DL (ref 11.5–15.5)
INR PPP: 1
MCH RBC QN AUTO: 29.9 PG (ref 26–35)
MCH RBC QN AUTO: 30.1 PG (ref 26–35)
MCHC RBC AUTO-ENTMCNC: 32.6 G/DL (ref 32–34.5)
MCHC RBC AUTO-ENTMCNC: 32.7 G/DL (ref 32–34.5)
MCV RBC AUTO: 91.9 FL (ref 80–99.9)
MCV RBC AUTO: 92 FL (ref 80–99.9)
PARTIAL THROMBOPLASTIN TIME: 29.6 SEC (ref 24.5–35.1)
PARTIAL THROMBOPLASTIN TIME: 31.7 SEC (ref 24.5–35.1)
PLATELET # BLD AUTO: 268 K/UL (ref 130–450)
PLATELET # BLD AUTO: 291 K/UL (ref 130–450)
PMV BLD AUTO: 10.6 FL (ref 7–12)
PMV BLD AUTO: 10.7 FL (ref 7–12)
PROTHROMBIN TIME: 11.3 SEC (ref 9.3–12.4)
RBC # BLD AUTO: 4.18 M/UL (ref 3.5–5.5)
RBC # BLD AUTO: 4.52 M/UL (ref 3.5–5.5)
WBC OTHER # BLD: 13.9 K/UL (ref 4.5–11.5)
WBC OTHER # BLD: 14.9 K/UL (ref 4.5–11.5)

## 2023-10-12 PROCEDURE — 0QSB04Z REPOSITION RIGHT LOWER FEMUR WITH INTERNAL FIXATION DEVICE, OPEN APPROACH: ICD-10-PCS | Performed by: ORTHOPAEDIC SURGERY

## 2023-10-12 PROCEDURE — 6370000000 HC RX 637 (ALT 250 FOR IP): Performed by: INTERNAL MEDICINE

## 2023-10-12 PROCEDURE — 6370000000 HC RX 637 (ALT 250 FOR IP)

## 2023-10-12 PROCEDURE — 6360000002 HC RX W HCPCS

## 2023-10-12 PROCEDURE — 3700000000 HC ANESTHESIA ATTENDED CARE: Performed by: ORTHOPAEDIC SURGERY

## 2023-10-12 PROCEDURE — 7100000001 HC PACU RECOVERY - ADDTL 15 MIN: Performed by: ORTHOPAEDIC SURGERY

## 2023-10-12 PROCEDURE — 85027 COMPLETE CBC AUTOMATED: CPT

## 2023-10-12 PROCEDURE — 2720000010 HC SURG SUPPLY STERILE: Performed by: ORTHOPAEDIC SURGERY

## 2023-10-12 PROCEDURE — 2500000003 HC RX 250 WO HCPCS: Performed by: ANESTHESIOLOGIST ASSISTANT

## 2023-10-12 PROCEDURE — 85730 THROMBOPLASTIN TIME PARTIAL: CPT

## 2023-10-12 PROCEDURE — 6360000002 HC RX W HCPCS: Performed by: ORTHOPAEDIC SURGERY

## 2023-10-12 PROCEDURE — C1769 GUIDE WIRE: HCPCS | Performed by: ORTHOPAEDIC SURGERY

## 2023-10-12 PROCEDURE — 6360000002 HC RX W HCPCS: Performed by: ANESTHESIOLOGY

## 2023-10-12 PROCEDURE — 2709999900 HC NON-CHARGEABLE SUPPLY: Performed by: ORTHOPAEDIC SURGERY

## 2023-10-12 PROCEDURE — 73552 X-RAY EXAM OF FEMUR 2/>: CPT

## 2023-10-12 PROCEDURE — 7100000000 HC PACU RECOVERY - FIRST 15 MIN: Performed by: ORTHOPAEDIC SURGERY

## 2023-10-12 PROCEDURE — 3600000015 HC SURGERY LEVEL 5 ADDTL 15MIN: Performed by: ORTHOPAEDIC SURGERY

## 2023-10-12 PROCEDURE — 73562 X-RAY EXAM OF KNEE 3: CPT

## 2023-10-12 PROCEDURE — C1713 ANCHOR/SCREW BN/BN,TIS/BN: HCPCS | Performed by: ORTHOPAEDIC SURGERY

## 2023-10-12 PROCEDURE — 6360000002 HC RX W HCPCS: Performed by: ANESTHESIOLOGIST ASSISTANT

## 2023-10-12 PROCEDURE — 82962 GLUCOSE BLOOD TEST: CPT

## 2023-10-12 PROCEDURE — 1200000000 HC SEMI PRIVATE

## 2023-10-12 PROCEDURE — 3600000005 HC SURGERY LEVEL 5 BASE: Performed by: ORTHOPAEDIC SURGERY

## 2023-10-12 PROCEDURE — 3700000001 HC ADD 15 MINUTES (ANESTHESIA): Performed by: ORTHOPAEDIC SURGERY

## 2023-10-12 PROCEDURE — 2580000003 HC RX 258: Performed by: ANESTHESIOLOGIST ASSISTANT

## 2023-10-12 DEVICE — SCREW BNE L65MM DIA3.5MM PROX TIB S STL ST FULL THRD T15: Type: IMPLANTABLE DEVICE | Site: FEMUR | Status: FUNCTIONAL

## 2023-10-12 DEVICE — IMPLANTABLE DEVICE: Type: IMPLANTABLE DEVICE | Site: FEMUR | Status: FUNCTIONAL

## 2023-10-12 DEVICE — SCREW LOCKING FOR IM NAIL 5X66MM XL25 SILX: Type: IMPLANTABLE DEVICE | Site: FEMUR | Status: FUNCTIONAL

## 2023-10-12 DEVICE — SCREW VA LCK OPTILINK SLF TP 5.0X75MM: Type: IMPLANTABLE DEVICE | Site: FEMUR | Status: FUNCTIONAL

## 2023-10-12 DEVICE — WASHER LKNG STTSCH RFNA 5 DEG RT ST: Type: IMPLANTABLE DEVICE | Site: FEMUR | Status: FUNCTIONAL

## 2023-10-12 DEVICE — SCREW VA LCK SCREOPTILINK 5.0X55MM: Type: IMPLANTABLE DEVICE | Site: FEMUR | Status: FUNCTIONAL

## 2023-10-12 DEVICE — SCREW BNE L60MM DIA3.5MM PROX TIB S STL ST FULL THRD T15: Type: IMPLANTABLE DEVICE | Site: FEMUR | Status: FUNCTIONAL

## 2023-10-12 DEVICE — SCREW BNE L75MM DIA3.5MM PROX TIB S STL ST FULL THRD T15: Type: IMPLANTABLE DEVICE | Site: FEMUR | Status: FUNCTIONAL

## 2023-10-12 RX ORDER — DEXTROSE MONOHYDRATE 100 MG/ML
INJECTION, SOLUTION INTRAVENOUS CONTINUOUS PRN
Status: DISCONTINUED | OUTPATIENT
Start: 2023-10-12 | End: 2023-10-17 | Stop reason: HOSPADM

## 2023-10-12 RX ORDER — LISINOPRIL 2.5 MG/1
2.5 TABLET ORAL DAILY
Status: DISCONTINUED | OUTPATIENT
Start: 2023-10-12 | End: 2023-10-17 | Stop reason: HOSPADM

## 2023-10-12 RX ORDER — TRAZODONE HYDROCHLORIDE 50 MG/1
50 TABLET ORAL NIGHTLY
Status: DISCONTINUED | OUTPATIENT
Start: 2023-10-12 | End: 2023-10-17 | Stop reason: HOSPADM

## 2023-10-12 RX ORDER — LIDOCAINE HYDROCHLORIDE 20 MG/ML
INJECTION, SOLUTION INTRAVENOUS PRN
Status: DISCONTINUED | OUTPATIENT
Start: 2023-10-12 | End: 2023-10-12 | Stop reason: SDUPTHER

## 2023-10-12 RX ORDER — BUPROPION HYDROCHLORIDE 100 MG/1
100 TABLET, EXTENDED RELEASE ORAL 2 TIMES DAILY
COMMUNITY

## 2023-10-12 RX ORDER — MORPHINE SULFATE 4 MG/ML
4 INJECTION, SOLUTION INTRAMUSCULAR; INTRAVENOUS EVERY 4 HOURS PRN
Status: DISCONTINUED | OUTPATIENT
Start: 2023-10-12 | End: 2023-10-14

## 2023-10-12 RX ORDER — INSULIN LISPRO 100 [IU]/ML
0-8 INJECTION, SOLUTION INTRAVENOUS; SUBCUTANEOUS
Status: DISCONTINUED | OUTPATIENT
Start: 2023-10-12 | End: 2023-10-17 | Stop reason: HOSPADM

## 2023-10-12 RX ORDER — SODIUM CHLORIDE 9 MG/ML
INJECTION, SOLUTION INTRAVENOUS CONTINUOUS PRN
Status: DISCONTINUED | OUTPATIENT
Start: 2023-10-12 | End: 2023-10-12 | Stop reason: SDUPTHER

## 2023-10-12 RX ORDER — SODIUM CHLORIDE 0.9 % (FLUSH) 0.9 %
5-40 SYRINGE (ML) INJECTION EVERY 12 HOURS SCHEDULED
Status: DISCONTINUED | OUTPATIENT
Start: 2023-10-12 | End: 2023-10-12 | Stop reason: HOSPADM

## 2023-10-12 RX ORDER — ROSUVASTATIN CALCIUM 20 MG/1
20 TABLET, COATED ORAL DAILY
Status: DISCONTINUED | OUTPATIENT
Start: 2023-10-12 | End: 2023-10-17 | Stop reason: HOSPADM

## 2023-10-12 RX ORDER — PHENYLEPHRINE HCL IN 0.9% NACL 1 MG/10 ML
SYRINGE (ML) INTRAVENOUS PRN
Status: DISCONTINUED | OUTPATIENT
Start: 2023-10-12 | End: 2023-10-12 | Stop reason: SDUPTHER

## 2023-10-12 RX ORDER — TRAZODONE HYDROCHLORIDE 50 MG/1
50 TABLET ORAL NIGHTLY PRN
COMMUNITY

## 2023-10-12 RX ORDER — ASPIRIN 325 MG
325 TABLET, DELAYED RELEASE (ENTERIC COATED) ORAL 2 TIMES DAILY
Qty: 56 TABLET | Refills: 0 | Status: SHIPPED | OUTPATIENT
Start: 2023-10-12 | End: 2023-11-09

## 2023-10-12 RX ORDER — SODIUM CHLORIDE 0.9 % (FLUSH) 0.9 %
5-40 SYRINGE (ML) INJECTION PRN
Status: DISCONTINUED | OUTPATIENT
Start: 2023-10-12 | End: 2023-10-12 | Stop reason: HOSPADM

## 2023-10-12 RX ORDER — PROPOFOL 10 MG/ML
INJECTION, EMULSION INTRAVENOUS PRN
Status: DISCONTINUED | OUTPATIENT
Start: 2023-10-12 | End: 2023-10-12 | Stop reason: SDUPTHER

## 2023-10-12 RX ORDER — TOPIRAMATE 25 MG/1
50 TABLET ORAL DAILY
Status: DISCONTINUED | OUTPATIENT
Start: 2023-10-12 | End: 2023-10-17 | Stop reason: HOSPADM

## 2023-10-12 RX ORDER — GLIPIZIDE 10 MG/1
10 TABLET ORAL
COMMUNITY

## 2023-10-12 RX ORDER — CEFAZOLIN SODIUM 1 G/3ML
INJECTION, POWDER, FOR SOLUTION INTRAMUSCULAR; INTRAVENOUS PRN
Status: DISCONTINUED | OUTPATIENT
Start: 2023-10-12 | End: 2023-10-12 | Stop reason: SDUPTHER

## 2023-10-12 RX ORDER — BUPROPION HYDROCHLORIDE 100 MG/1
100 TABLET, EXTENDED RELEASE ORAL 2 TIMES DAILY
Status: DISCONTINUED | OUTPATIENT
Start: 2023-10-12 | End: 2023-10-17 | Stop reason: HOSPADM

## 2023-10-12 RX ORDER — FENTANYL CITRATE 50 UG/ML
50 INJECTION, SOLUTION INTRAMUSCULAR; INTRAVENOUS EVERY 5 MIN PRN
Status: DISCONTINUED | OUTPATIENT
Start: 2023-10-12 | End: 2023-10-12 | Stop reason: HOSPADM

## 2023-10-12 RX ORDER — HYDROMORPHONE HYDROCHLORIDE 2 MG/ML
INJECTION, SOLUTION INTRAMUSCULAR; INTRAVENOUS; SUBCUTANEOUS PRN
Status: DISCONTINUED | OUTPATIENT
Start: 2023-10-12 | End: 2023-10-12 | Stop reason: SDUPTHER

## 2023-10-12 RX ORDER — SODIUM CHLORIDE 9 MG/ML
INJECTION, SOLUTION INTRAVENOUS PRN
Status: DISCONTINUED | OUTPATIENT
Start: 2023-10-12 | End: 2023-10-12 | Stop reason: HOSPADM

## 2023-10-12 RX ORDER — FAMOTIDINE 40 MG/1
40 TABLET, FILM COATED ORAL
COMMUNITY

## 2023-10-12 RX ORDER — FENTANYL CITRATE 50 UG/ML
25 INJECTION, SOLUTION INTRAMUSCULAR; INTRAVENOUS EVERY 5 MIN PRN
Status: DISCONTINUED | OUTPATIENT
Start: 2023-10-12 | End: 2023-10-12 | Stop reason: HOSPADM

## 2023-10-12 RX ORDER — PANTOPRAZOLE SODIUM 40 MG/1
40 TABLET, DELAYED RELEASE ORAL EVERY MORNING
COMMUNITY

## 2023-10-12 RX ORDER — PANTOPRAZOLE SODIUM 40 MG/1
40 TABLET, DELAYED RELEASE ORAL
Status: DISCONTINUED | OUTPATIENT
Start: 2023-10-12 | End: 2023-10-17 | Stop reason: HOSPADM

## 2023-10-12 RX ORDER — ONDANSETRON 2 MG/ML
INJECTION INTRAMUSCULAR; INTRAVENOUS PRN
Status: DISCONTINUED | OUTPATIENT
Start: 2023-10-12 | End: 2023-10-12 | Stop reason: SDUPTHER

## 2023-10-12 RX ORDER — FENTANYL CITRATE 50 UG/ML
INJECTION, SOLUTION INTRAMUSCULAR; INTRAVENOUS PRN
Status: DISCONTINUED | OUTPATIENT
Start: 2023-10-12 | End: 2023-10-12 | Stop reason: SDUPTHER

## 2023-10-12 RX ORDER — DEXAMETHASONE SODIUM PHOSPHATE 10 MG/ML
INJECTION INTRAMUSCULAR; INTRAVENOUS PRN
Status: DISCONTINUED | OUTPATIENT
Start: 2023-10-12 | End: 2023-10-12 | Stop reason: SDUPTHER

## 2023-10-12 RX ORDER — DICYCLOMINE HCL 20 MG
20 TABLET ORAL 3 TIMES DAILY
COMMUNITY

## 2023-10-12 RX ORDER — ONDANSETRON 2 MG/ML
4 INJECTION INTRAMUSCULAR; INTRAVENOUS
Status: DISCONTINUED | OUTPATIENT
Start: 2023-10-12 | End: 2023-10-12 | Stop reason: HOSPADM

## 2023-10-12 RX ORDER — INSULIN LISPRO 100 [IU]/ML
0-4 INJECTION, SOLUTION INTRAVENOUS; SUBCUTANEOUS NIGHTLY
Status: DISCONTINUED | OUTPATIENT
Start: 2023-10-12 | End: 2023-10-17 | Stop reason: HOSPADM

## 2023-10-12 RX ORDER — TOBRAMYCIN 1.2 G/30ML
INJECTION, POWDER, LYOPHILIZED, FOR SOLUTION INTRAVENOUS PRN
Status: DISCONTINUED | OUTPATIENT
Start: 2023-10-12 | End: 2023-10-12 | Stop reason: ALTCHOICE

## 2023-10-12 RX ORDER — ROCURONIUM BROMIDE 10 MG/ML
INJECTION, SOLUTION INTRAVENOUS PRN
Status: DISCONTINUED | OUTPATIENT
Start: 2023-10-12 | End: 2023-10-12 | Stop reason: SDUPTHER

## 2023-10-12 RX ORDER — LISINOPRIL 2.5 MG/1
2.5 TABLET ORAL NIGHTLY
COMMUNITY

## 2023-10-12 RX ORDER — OXYCODONE HYDROCHLORIDE AND ACETAMINOPHEN 5; 325 MG/1; MG/1
1 TABLET ORAL EVERY 6 HOURS PRN
Qty: 28 TABLET | Refills: 0 | Status: SHIPPED | OUTPATIENT
Start: 2023-10-12 | End: 2023-10-19

## 2023-10-12 RX ORDER — OXYCODONE HYDROCHLORIDE AND ACETAMINOPHEN 5; 325 MG/1; MG/1
1 TABLET ORAL EVERY 6 HOURS PRN
Status: DISCONTINUED | OUTPATIENT
Start: 2023-10-12 | End: 2023-10-13

## 2023-10-12 RX ADMIN — Medication 150 MCG: at 16:48

## 2023-10-12 RX ADMIN — ROCURONIUM BROMIDE 10 MG: 10 INJECTION, SOLUTION INTRAVENOUS at 17:25

## 2023-10-12 RX ADMIN — MORPHINE SULFATE 4 MG: 4 INJECTION, SOLUTION INTRAMUSCULAR; INTRAVENOUS at 13:12

## 2023-10-12 RX ADMIN — MORPHINE SULFATE 4 MG: 4 INJECTION, SOLUTION INTRAMUSCULAR; INTRAVENOUS at 03:33

## 2023-10-12 RX ADMIN — METOPROLOL TARTRATE 25 MG: 25 TABLET, FILM COATED ORAL at 13:17

## 2023-10-12 RX ADMIN — BUPROPION HYDROCHLORIDE 100 MG: 100 TABLET, EXTENDED RELEASE ORAL at 13:22

## 2023-10-12 RX ADMIN — MORPHINE SULFATE 4 MG: 4 INJECTION, SOLUTION INTRAMUSCULAR; INTRAVENOUS at 08:36

## 2023-10-12 RX ADMIN — HYDROMORPHONE HYDROCHLORIDE 1 MG: 2 INJECTION, SOLUTION INTRAMUSCULAR; INTRAVENOUS; SUBCUTANEOUS at 17:09

## 2023-10-12 RX ADMIN — MORPHINE SULFATE 4 MG: 4 INJECTION, SOLUTION INTRAMUSCULAR; INTRAVENOUS at 22:55

## 2023-10-12 RX ADMIN — SODIUM CHLORIDE: 9 INJECTION, SOLUTION INTRAVENOUS at 16:39

## 2023-10-12 RX ADMIN — PANTOPRAZOLE SODIUM 40 MG: 40 TABLET, DELAYED RELEASE ORAL at 13:17

## 2023-10-12 RX ADMIN — FENTANYL CITRATE 50 MCG: 50 INJECTION, SOLUTION INTRAMUSCULAR; INTRAVENOUS at 19:10

## 2023-10-12 RX ADMIN — DEXAMETHASONE SODIUM PHOSPHATE 4 MG: 10 INJECTION INTRAMUSCULAR; INTRAVENOUS at 16:54

## 2023-10-12 RX ADMIN — INSULIN LISPRO 4 UNITS: 100 INJECTION, SOLUTION INTRAVENOUS; SUBCUTANEOUS at 08:35

## 2023-10-12 RX ADMIN — TRAZODONE HYDROCHLORIDE 50 MG: 50 TABLET ORAL at 22:56

## 2023-10-12 RX ADMIN — SUGAMMADEX 150 MG: 100 INJECTION, SOLUTION INTRAVENOUS at 18:31

## 2023-10-12 RX ADMIN — SODIUM CHLORIDE: 9 INJECTION, SOLUTION INTRAVENOUS at 18:10

## 2023-10-12 RX ADMIN — HYDROMORPHONE HYDROCHLORIDE 1 MG: 2 INJECTION, SOLUTION INTRAMUSCULAR; INTRAVENOUS; SUBCUTANEOUS at 17:26

## 2023-10-12 RX ADMIN — OXYCODONE AND ACETAMINOPHEN 1 TABLET: 325; 5 TABLET ORAL at 10:14

## 2023-10-12 RX ADMIN — METOPROLOL TARTRATE 25 MG: 25 TABLET, FILM COATED ORAL at 22:56

## 2023-10-12 RX ADMIN — Medication 50 MCG: at 16:45

## 2023-10-12 RX ADMIN — PROPOFOL 150 MG: 10 INJECTION, EMULSION INTRAVENOUS at 16:41

## 2023-10-12 RX ADMIN — LIDOCAINE HYDROCHLORIDE 100 MG: 20 INJECTION, SOLUTION INTRAVENOUS at 16:41

## 2023-10-12 RX ADMIN — INSULIN LISPRO 4 UNITS: 100 INJECTION, SOLUTION INTRAVENOUS; SUBCUTANEOUS at 22:55

## 2023-10-12 RX ADMIN — TOPIRAMATE 50 MG: 25 TABLET, FILM COATED ORAL at 13:18

## 2023-10-12 RX ADMIN — ONDANSETRON 4 MG: 2 INJECTION INTRAMUSCULAR; INTRAVENOUS at 17:25

## 2023-10-12 RX ADMIN — FENTANYL CITRATE 100 MCG: 50 INJECTION, SOLUTION INTRAMUSCULAR; INTRAVENOUS at 16:41

## 2023-10-12 RX ADMIN — CEFAZOLIN 2 G: 1 INJECTION, POWDER, FOR SOLUTION INTRAMUSCULAR; INTRAVENOUS at 16:54

## 2023-10-12 RX ADMIN — ROCURONIUM BROMIDE 50 MG: 10 INJECTION, SOLUTION INTRAVENOUS at 16:41

## 2023-10-12 ASSESSMENT — PAIN DESCRIPTION - PAIN TYPE
TYPE: SURGICAL PAIN
TYPE: SURGICAL PAIN

## 2023-10-12 ASSESSMENT — PAIN DESCRIPTION - LOCATION
LOCATION: LEG

## 2023-10-12 ASSESSMENT — PAIN DESCRIPTION - ORIENTATION
ORIENTATION: RIGHT

## 2023-10-12 ASSESSMENT — PAIN DESCRIPTION - DESCRIPTORS
DESCRIPTORS: PATIENT UNABLE TO DESCRIBE
DESCRIPTORS: ACHING;THROBBING
DESCRIPTORS: ACHING

## 2023-10-12 ASSESSMENT — COPD QUESTIONNAIRES: CAT_SEVERITY: MILD

## 2023-10-12 ASSESSMENT — PAIN SCALES - GENERAL
PAINLEVEL_OUTOF10: 9
PAINLEVEL_OUTOF10: 10
PAINLEVEL_OUTOF10: 8
PAINLEVEL_OUTOF10: 8

## 2023-10-12 ASSESSMENT — PAIN - FUNCTIONAL ASSESSMENT: PAIN_FUNCTIONAL_ASSESSMENT: PREVENTS OR INTERFERES SOME ACTIVE ACTIVITIES AND ADLS

## 2023-10-12 ASSESSMENT — PAIN DESCRIPTION - FREQUENCY: FREQUENCY: CONTINUOUS

## 2023-10-12 ASSESSMENT — LIFESTYLE VARIABLES: SMOKING_STATUS: 1

## 2023-10-12 ASSESSMENT — PAIN DESCRIPTION - ONSET
ONSET: PROGRESSIVE
ONSET: AWAKENED FROM SLEEP

## 2023-10-12 NOTE — ANESTHESIA PRE PROCEDURE
Department of Anesthesiology  Preprocedure Note       Name:  Ana Araujo   Age:  80 y.o.  :  1955                                          MRN:  [id number]         Date:  10/12/2023      Surgeon: Gia Perez):  Jackie Roblero MD    Procedure: Procedure(s):  RIGHT DISTAL FEMUR OPEN REDUCTION INTERNAL FIXATION    Medications prior to admission:   Prior to Admission medications    Medication Sig Start Date End Date Taking? Authorizing Provider   buPROPion (WELLBUTRIN SR) 100 MG extended release tablet Take 1 tablet by mouth 2 times daily   Yes Francois Zhou MD   dicyclomine (BENTYL) 20 MG tablet Take 1 tablet by mouth 3 times daily   Yes Francois Zhou MD   famotidine (PEPCID) 40 MG tablet Take 1 tablet by mouth Daily with supper   Yes Francois Zhou MD   glipiZIDE (GLUCOTROL) 10 MG tablet Take 1 tablet by mouth 2 times daily (before meals)   Yes ProviderFrancois MD   lisinopril (PRINIVIL;ZESTRIL) 2.5 MG tablet Take 1 tablet by mouth at bedtime   Yes ProviderFrancois MD   metFORMIN (GLUCOPHAGE) 1000 MG tablet Take 0.5 tablets by mouth in the morning, at noon, in the evening, and at bedtime   Yes Provider, MD Francois   metoprolol tartrate (LOPRESSOR) 25 MG tablet Take 1 tablet by mouth in the morning, at noon, and at bedtime   Yes ProviderFrancois MD   pantoprazole (PROTONIX) 40 MG tablet Take 1 tablet by mouth every morning   Yes Francois Zhou MD   traZODone (DESYREL) 50 MG tablet Take 1 tablet by mouth nightly as needed for Sleep   Yes ProviderFrancois MD   gabapentin (NEURONTIN) 400 MG capsule Take 1 capsule by mouth 3 times daily for 90 days.  23  Vashti Larson MD   topiramate (TOPAMAX) 50 MG tablet Take 1 tablet by mouth daily 23   Vashti Larson MD   spironolactone-hydroCHLOROthiazide (ALDACTAZIDE) 25-25 MG per tablet Take 1 tablet by mouth daily 23   Vashti Larson MD   cyclobenzaprine (FLEXERIL) 10

## 2023-10-12 NOTE — DISCHARGE INSTRUCTIONS
multimodal pain control but opioid medications are tapered in all of our patients. If patient requires referral to pain management for prolonged taper off of opioid pain medication we will facilitate this process.

## 2023-10-12 NOTE — H&P
Saurabh Rizzo MD [address] [place] [address]  Internal medicine   History and Physical      CHIEF COMPLAINT: Patient fell at home      HISTORY OF PRESENT ILLNESS:   10/12/2023  Patient was seen in the emergency room earlier this morning at the [address] reviewed in detail with the patient  67-year-old woman sustained a mechanical fall when she was trying to reach out for a dog toy  Initially seen at [address] where she was found to have right knee tibial plateau fracture  Transferred over to [facility] for orthopedic intervention  No syncope chest pain shortness of breath  Pain control currently adequate    Past Medical History:    Past Medical History:   Diagnosis Date    Anemia     COPD (chronic obstructive pulmonary disease) ([facility])     Diabetes mellitus ([facility])     Fibromyalgia     GERD (gastroesophageal reflux disease)     Hx of migraine headaches     Hyperlipidemia     Hypertension     Major depressive disorder     Polycythemia vera ([facility])     Raynaud disease        Past Surgical History:    Past Surgical History:   Procedure Laterality Date    APPENDECTOMY      CHOLECYSTECTOMY      COLONOSCOPY N/A 2/4/2019    COLONOSCOPY DIAGNOSTIC    ++ISOLATION++ performed by Bay Palma MD at [address] [place] [address] (CERVIX STATUS UNKNOWN)      UPPER GASTROINTESTINAL ENDOSCOPY N/A 2/4/2019    EGD BIOPSY performed by Bay Palma MD at [facility] ENDOSCOPY       Medications Prior to Admission:    Not in a hospital admission. Allergies:    Patient has no known allergies. Social History:    reports that she has been smoking cigarettes. She started smoking about 49 years ago. She has a 25.00 pack-year smoking history. She has never been exposed to tobacco smoke. She has never used smokeless tobacco. She reports that she does not drink alcohol and does not use drugs. Family History:   family history is not on file.     REVIEW OF SYSTEMS:  As above in the HPI, otherwise negative    PHYSICAL

## 2023-10-12 NOTE — OP NOTE
DIA3.5MM PROX TIB S STL ST FULL THRD T15  DEPUY [employer]ES [employer]-WD  Right 1 Implanted         Drains: * No LDAs found *    Findings: Use a Synthes 10 mm x 340 mm retrograde nail with a locking attachment washer. Overall near-anatomic reduction significant comminution at the fracture site        Detailed Description of Procedure:   Patient was brought to the operating room in a supine position on the hospital room bed. Patient was transferred to the operating room table by multiple individuals in the safe fashion with anesthesia and control the patient C-spine area. Once in the operating room table all points pressure identified well-padded. Patient's right lower extremity was sterilely prepped and draped in a standard orthopedic fashion. A timeout was performed indicating the appropriate identification of the patient, the procedure to be performed, and the side to be performed upon. This was agreed upon by all individuals in the room. After the timeout the right leg was placed over a triangle. A incision was marked out over the medial side the patellar tendon for insertion of the nail and a distal lateral approach to the femur was also marked out. We initially started with a lateral approach of the femur. A 10 blade used to make an incision. Careful dissection was carried down to the iliotibial band which was incised in line with its fibers. We then exposed the fracture site reducing with a collinear clamp. Attention was turned to the anterior knee where the incision was made. We went medial parapatellar and gained access to the joint. We then inserted a guidewire in the appropriate starting position for the nail. Once in appropriate position the entry reamer was utilized to gain access to the intramedullary canal.  We then placed a ball-tipped guidewire and measured the length of the nail. We sequentially reamed from 8.5 mm up to 11.5 mm there for the 10 mm diameter nail was chosen.   We inserted the

## 2023-10-12 NOTE — CONSULTS
appropriate questions and decided to go forward with the procedure. Past Medical History:   Diagnosis Date    Anemia     COPD (chronic obstructive pulmonary disease) (HCC)     Diabetes mellitus (HCC)     Fibromyalgia     GERD (gastroesophageal reflux disease)     Hx of migraine headaches     Hyperlipidemia     Hypertension     Major depressive disorder     Polycythemia vera ([address])     Raynaud disease      Past Surgical History:   Procedure Laterality Date    APPENDECTOMY      CHOLECYSTECTOMY      COLONOSCOPY N/A 2/4/2019    COLONOSCOPY DIAGNOSTIC    ++ISOLATION++ performed by Lonnie Subramanian MD at [address] [place] [address] (CERVIX STATUS UNKNOWN)      UPPER GASTROINTESTINAL ENDOSCOPY N/A 2/4/2019    EGD BIOPSY performed by Lonnie Subramanian MD at [facility] ENDOSCOPY     History reviewed. No pertinent family history. Social History     Tobacco Use    Smoking status: Every Day     Packs/day: 0.50     Years: 50.00     Additional pack years: 0.00     Total pack years: 25.00     Types: Cigarettes     Start date: 1/1/1974     Passive exposure: Never    Smokeless tobacco: Never   Substance Use Topics    Alcohol use: No    Drug use: No     No Known Allergies      Physical Examination:   General appearance: alert, well appearing, and in no distress,  normal appearing weight. No visible signs of trauma   Mental status: alert, oriented to person, place, and time, normal mood, behavior, speech, dress, motor activity, and thought processes  Abdomen: soft, nondistended  Resp:   resp easy and unlabored, no audible wheezes note, normal symmetrical expansion of both hemithoraces  Cardiac: distal pulses palpable, skin and extremities well perfused  Neurological: alert, oriented X3, normal speech, no focal findings or movement disorder noted, motor and sensory grossly normal bilaterally, normal muscle tone, no tremors  HEENT: normochephalic atraumatic, external ears and eyes normal, sclera normal, neck supple, no nasal discharge.

## 2023-10-12 NOTE — ED NOTES
Pt does not want to sign consent until procedure is explained. Form filled out by nurse and placed in soft chart but not signed by patient at this time.       John Jerry RN  10/12/23 7977

## 2023-10-12 NOTE — ED PROVIDER NOTES
[facility]        Pt Name: Brooke Arias  MRN: [id number]  [facility] 1955  Date of evaluation: 10/11/2023  Provider: Nichol Milligan DO  PCP: Cierra Jonas MD  Note Started: 10:26 PM EDT 10/11/23    CHIEF COMPLAINT       Chief Complaint   Patient presents with    Leg Injury     Patient states she fell and broke her right leg. Transfer from [address] [place] [address]. Patient did not hit her head. Given 50 mcgs of fentanyl at [facility]. HISTORY OF PRESENT ILLNESS: 1 or more Elements   History From: Patient    Limitations to history : None    Brooke Arias is a 80 y.o. female who presents to the emergency department for a fall. The patient states earlier today she was walking when her foot got caught on her floor causing her to lose balance and fall onto her right side. Patient did not hit her head. No LOC. Patient presented to [facility] and had imaging which showed a distal femur fracture. Patient transferred to our facility for orthopedic care. Nursing Notes were all reviewed and agreed with or any disagreements were addressed in the HPI. REVIEW OF EXTERNAL NOTE :       PDMP Monitoring:    Last PDMP Misha as Reviewed:  Review User Review Instant Review Result            Urine Drug Screenings (1 yr)    No resulted procedures found. Medication Contract and Consent for Opioid Use Documents Filed        No documents found                      REVIEW OF SYSTEMS :           Positives and Pertinent negatives as per HPI.      SURGICAL HISTORY     Past Surgical History:   Procedure Laterality Date    APPENDECTOMY      CHOLECYSTECTOMY      COLONOSCOPY N/A 2/4/2019    COLONOSCOPY DIAGNOSTIC    ++ISOLATION++ performed by Debra Rodriguez MD at [address] [place] [address] (CERVIX STATUS UNKNOWN)      UPPER GASTROINTESTINAL ENDOSCOPY N/A 2/4/2019    EGD BIOPSY performed by Jerzy Ansari

## 2023-10-12 NOTE — ANESTHESIA POSTPROCEDURE EVALUATION
Department of Anesthesiology  Postprocedure Note    Patient: Yue Boogie  MRN: [id number]  YOB: 1955  Date of evaluation: 10/12/2023      Procedure Summary     Date: 10/12/23 Room / Location: [facility] OR 09 / [facility]    Anesthesia Start: 1006 Anesthesia Stop: 1851    Procedure: RIGHT DISTAL FEMUR OPEN REDUCTION INTERNAL FIXATION (Right: Leg Upper) Diagnosis:       Closed fracture of distal end of right femur, unspecified fracture morphology, initial encounter ([address])      (Closed fracture of distal end of right femur, unspecified fracture morphology, initial encounter ([address]) Leamon Push)    Surgeons: Connie Hurt MD Responsible Provider: Danita Suárez MD    Anesthesia Type: General ASA Status: 3          Anesthesia Type: General    Roxanna Phase I:      Roxanna Phase II:        Anesthesia Post Evaluation    Patient location during evaluation: PACU  Patient participation: complete - patient participated  Level of consciousness: awake and alert  Pain score: 2  Airway patency: patent  Nausea & Vomiting: no nausea and no vomiting  Complications: no  Cardiovascular status: blood pressure returned to baseline  Respiratory status: acceptable  Hydration status: euvolemic  Pain management: adequate

## 2023-10-12 NOTE — ED NOTES
Pt A&Ox4. Surgeon has not explained procedure to pt at this time. Consent form not signed at this time.       Garry Anderson RN  10/12/23 5827

## 2023-10-13 LAB
GLUCOSE BLD-MCNC: 240 MG/DL (ref 74–99)
GLUCOSE BLD-MCNC: 247 MG/DL (ref 74–99)
GLUCOSE BLD-MCNC: 257 MG/DL (ref 74–99)
GLUCOSE BLD-MCNC: 346 MG/DL (ref 74–99)

## 2023-10-13 PROCEDURE — 97530 THERAPEUTIC ACTIVITIES: CPT

## 2023-10-13 PROCEDURE — 6370000000 HC RX 637 (ALT 250 FOR IP): Performed by: STUDENT IN AN ORGANIZED HEALTH CARE EDUCATION/TRAINING PROGRAM

## 2023-10-13 PROCEDURE — 82962 GLUCOSE BLOOD TEST: CPT

## 2023-10-13 PROCEDURE — 6360000002 HC RX W HCPCS

## 2023-10-13 PROCEDURE — 1200000000 HC SEMI PRIVATE

## 2023-10-13 PROCEDURE — 97165 OT EVAL LOW COMPLEX 30 MIN: CPT

## 2023-10-13 PROCEDURE — 97161 PT EVAL LOW COMPLEX 20 MIN: CPT

## 2023-10-13 PROCEDURE — 6360000002 HC RX W HCPCS: Performed by: STUDENT IN AN ORGANIZED HEALTH CARE EDUCATION/TRAINING PROGRAM

## 2023-10-13 PROCEDURE — 2580000003 HC RX 258: Performed by: STUDENT IN AN ORGANIZED HEALTH CARE EDUCATION/TRAINING PROGRAM

## 2023-10-13 PROCEDURE — 6370000000 HC RX 637 (ALT 250 FOR IP)

## 2023-10-13 PROCEDURE — 97535 SELF CARE MNGMENT TRAINING: CPT

## 2023-10-13 PROCEDURE — 6360000002 HC RX W HCPCS: Performed by: FAMILY MEDICINE

## 2023-10-13 PROCEDURE — 6370000000 HC RX 637 (ALT 250 FOR IP): Performed by: FAMILY MEDICINE

## 2023-10-13 PROCEDURE — L1832 KO ADJ JNT POS R SUP PRE CST: HCPCS

## 2023-10-13 RX ORDER — OXYCODONE HYDROCHLORIDE 10 MG/1
10 TABLET ORAL EVERY 4 HOURS PRN
Status: DISCONTINUED | OUTPATIENT
Start: 2023-10-13 | End: 2023-10-17 | Stop reason: HOSPADM

## 2023-10-13 RX ORDER — OXYCODONE HYDROCHLORIDE 5 MG/1
5 TABLET ORAL EVERY 4 HOURS PRN
Status: DISCONTINUED | OUTPATIENT
Start: 2023-10-13 | End: 2023-10-17 | Stop reason: HOSPADM

## 2023-10-13 RX ORDER — SODIUM CHLORIDE 0.9 % (FLUSH) 0.9 %
5-40 SYRINGE (ML) INJECTION PRN
Status: DISCONTINUED | OUTPATIENT
Start: 2023-10-13 | End: 2023-10-17 | Stop reason: HOSPADM

## 2023-10-13 RX ORDER — SODIUM CHLORIDE 0.9 % (FLUSH) 0.9 %
5-40 SYRINGE (ML) INJECTION EVERY 12 HOURS SCHEDULED
Status: DISCONTINUED | OUTPATIENT
Start: 2023-10-13 | End: 2023-10-17 | Stop reason: HOSPADM

## 2023-10-13 RX ORDER — SODIUM CHLORIDE 9 MG/ML
INJECTION, SOLUTION INTRAVENOUS PRN
Status: DISCONTINUED | OUTPATIENT
Start: 2023-10-13 | End: 2023-10-17 | Stop reason: HOSPADM

## 2023-10-13 RX ORDER — ASPIRIN 325 MG
325 TABLET, DELAYED RELEASE (ENTERIC COATED) ORAL 2 TIMES DAILY
Status: DISCONTINUED | OUTPATIENT
Start: 2023-10-13 | End: 2023-10-17 | Stop reason: HOSPADM

## 2023-10-13 RX ORDER — ACETAMINOPHEN 325 MG/1
650 TABLET ORAL EVERY 6 HOURS
Status: DISCONTINUED | OUTPATIENT
Start: 2023-10-13 | End: 2023-10-17 | Stop reason: HOSPADM

## 2023-10-13 RX ORDER — KETOROLAC TROMETHAMINE 15 MG/ML
15 INJECTION, SOLUTION INTRAMUSCULAR; INTRAVENOUS EVERY 6 HOURS PRN
Status: DISCONTINUED | OUTPATIENT
Start: 2023-10-13 | End: 2023-10-13

## 2023-10-13 RX ORDER — KETOROLAC TROMETHAMINE 15 MG/ML
15 INJECTION, SOLUTION INTRAMUSCULAR; INTRAVENOUS EVERY 6 HOURS PRN
Status: COMPLETED | OUTPATIENT
Start: 2023-10-13 | End: 2023-10-14

## 2023-10-13 RX ADMIN — METOPROLOL TARTRATE 25 MG: 25 TABLET, FILM COATED ORAL at 14:44

## 2023-10-13 RX ADMIN — METOPROLOL TARTRATE 25 MG: 25 TABLET, FILM COATED ORAL at 08:55

## 2023-10-13 RX ADMIN — INSULIN LISPRO 2 UNITS: 100 INJECTION, SOLUTION INTRAVENOUS; SUBCUTANEOUS at 09:01

## 2023-10-13 RX ADMIN — TOPIRAMATE 50 MG: 25 TABLET, FILM COATED ORAL at 08:55

## 2023-10-13 RX ADMIN — KETOROLAC TROMETHAMINE 15 MG: 15 INJECTION, SOLUTION INTRAMUSCULAR; INTRAVENOUS at 16:03

## 2023-10-13 RX ADMIN — TRAZODONE HYDROCHLORIDE 50 MG: 50 TABLET ORAL at 21:52

## 2023-10-13 RX ADMIN — ACETAMINOPHEN 650 MG: 325 TABLET ORAL at 07:28

## 2023-10-13 RX ADMIN — ROSUVASTATIN 20 MG: 20 TABLET, FILM COATED ORAL at 21:54

## 2023-10-13 RX ADMIN — MORPHINE SULFATE 4 MG: 4 INJECTION, SOLUTION INTRAMUSCULAR; INTRAVENOUS at 19:02

## 2023-10-13 RX ADMIN — ASPIRIN 325 MG: 325 TABLET, COATED ORAL at 09:01

## 2023-10-13 RX ADMIN — OXYCODONE HYDROCHLORIDE 10 MG: 10 TABLET ORAL at 15:59

## 2023-10-13 RX ADMIN — KETOROLAC TROMETHAMINE 15 MG: 15 INJECTION, SOLUTION INTRAMUSCULAR; INTRAVENOUS at 09:02

## 2023-10-13 RX ADMIN — INSULIN LISPRO 2 UNITS: 100 INJECTION, SOLUTION INTRAVENOUS; SUBCUTANEOUS at 11:57

## 2023-10-13 RX ADMIN — ACETAMINOPHEN 650 MG: 325 TABLET ORAL at 19:03

## 2023-10-13 RX ADMIN — BUPROPION HYDROCHLORIDE 100 MG: 100 TABLET, EXTENDED RELEASE ORAL at 21:55

## 2023-10-13 RX ADMIN — OXYCODONE HYDROCHLORIDE 10 MG: 10 TABLET ORAL at 21:53

## 2023-10-13 RX ADMIN — MORPHINE SULFATE 4 MG: 4 INJECTION, SOLUTION INTRAMUSCULAR; INTRAVENOUS at 14:44

## 2023-10-13 RX ADMIN — WATER 2000 MG: 1 INJECTION INTRAMUSCULAR; INTRAVENOUS; SUBCUTANEOUS at 09:09

## 2023-10-13 RX ADMIN — MORPHINE SULFATE 4 MG: 4 INJECTION, SOLUTION INTRAMUSCULAR; INTRAVENOUS at 03:32

## 2023-10-13 RX ADMIN — BUPROPION HYDROCHLORIDE 100 MG: 100 TABLET, EXTENDED RELEASE ORAL at 08:56

## 2023-10-13 RX ADMIN — Medication 10 ML: at 21:56

## 2023-10-13 RX ADMIN — LISINOPRIL 2.5 MG: 2.5 TABLET ORAL at 08:55

## 2023-10-13 RX ADMIN — INSULIN LISPRO 6 UNITS: 100 INJECTION, SOLUTION INTRAVENOUS; SUBCUTANEOUS at 17:15

## 2023-10-13 RX ADMIN — OXYCODONE HYDROCHLORIDE 5 MG: 5 TABLET ORAL at 11:53

## 2023-10-13 RX ADMIN — ACETAMINOPHEN 650 MG: 325 TABLET ORAL at 11:53

## 2023-10-13 RX ADMIN — WATER 2000 MG: 1 INJECTION INTRAMUSCULAR; INTRAVENOUS; SUBCUTANEOUS at 17:15

## 2023-10-13 RX ADMIN — PANTOPRAZOLE SODIUM 40 MG: 40 TABLET, DELAYED RELEASE ORAL at 07:25

## 2023-10-13 RX ADMIN — OXYCODONE AND ACETAMINOPHEN 1 TABLET: 325; 5 TABLET ORAL at 01:52

## 2023-10-13 RX ADMIN — ASPIRIN 325 MG: 325 TABLET, COATED ORAL at 21:54

## 2023-10-13 ASSESSMENT — PAIN DESCRIPTION - ORIENTATION
ORIENTATION: RIGHT
ORIENTATION: LEFT
ORIENTATION: RIGHT
ORIENTATION: RIGHT

## 2023-10-13 ASSESSMENT — PAIN DESCRIPTION - LOCATION
LOCATION: LEG
LOCATION: KNEE
LOCATION: KNEE
LOCATION: LEG

## 2023-10-13 ASSESSMENT — PAIN SCALES - GENERAL
PAINLEVEL_OUTOF10: 7
PAINLEVEL_OUTOF10: 7
PAINLEVEL_OUTOF10: 10
PAINLEVEL_OUTOF10: 10
PAINLEVEL_OUTOF10: 9
PAINLEVEL_OUTOF10: 8
PAINLEVEL_OUTOF10: 5

## 2023-10-13 ASSESSMENT — PAIN DESCRIPTION - ONSET: ONSET: PROGRESSIVE

## 2023-10-13 ASSESSMENT — PAIN DESCRIPTION - PAIN TYPE
TYPE: SURGICAL PAIN
TYPE: SURGICAL PAIN

## 2023-10-13 ASSESSMENT — PAIN DESCRIPTION - FREQUENCY: FREQUENCY: CONTINUOUS

## 2023-10-13 ASSESSMENT — PAIN DESCRIPTION - DESCRIPTORS
DESCRIPTORS: ACHING;BURNING
DESCRIPTORS: ACHING
DESCRIPTORS: BURNING;ACHING
DESCRIPTORS: ACHING;SORE
DESCRIPTORS: ACHING;BURNING
DESCRIPTORS: ACHING
DESCRIPTORS: ACHING
DESCRIPTORS: ACHING;SHOOTING
DESCRIPTORS: ACHING

## 2023-10-13 ASSESSMENT — PAIN - FUNCTIONAL ASSESSMENT
PAIN_FUNCTIONAL_ASSESSMENT: PREVENTS OR INTERFERES SOME ACTIVE ACTIVITIES AND ADLS
PAIN_FUNCTIONAL_ASSESSMENT: PREVENTS OR INTERFERES SOME ACTIVE ACTIVITIES AND ADLS

## 2023-10-13 NOTE — CARE COORDINATION
10/13. Met with the pt at the bedside to discuss transition of care. The pt lives with her sister and is independent. The pt could not remember her address or PCP. Spoke to her sister, Tong Fisher. The pt's PCP is Dr Emmanuel Ayala. Bianca tello [facility]. Referral made.  Rudy Pablo RN

## 2023-10-13 NOTE — PLAN OF CARE
Problem: Discharge Planning  Goal: Discharge to home or other facility with appropriate resources  Outcome: Progressing  Flowsheets (Taken 10/12/2023 2230)  Discharge to home or other facility with appropriate resources: Identify barriers to discharge with patient and caregiver     Problem: Pain  Goal: Verbalizes/displays adequate comfort level or baseline comfort level  Outcome: Progressing  Flowsheets (Taken 10/12/2023 2230)  Verbalizes/displays adequate comfort level or baseline comfort level: Encourage patient to monitor pain and request assistance     Problem: Chronic Conditions and Co-morbidities  Goal: Patient's chronic conditions and co-morbidity symptoms are monitored and maintained or improved  Outcome: Progressing  Flowsheets (Taken 10/12/2023 2230)  Care Plan - Patient's Chronic Conditions and Co-Morbidity Symptoms are Monitored and Maintained or Improved: Monitor and assess patient's chronic conditions and comorbid symptoms for stability, deterioration, or improvement     Problem: Safety - Adult  Goal: Free from fall injury  Outcome: Progressing  Flowsheets (Taken 10/12/2023 2230)  Free From Fall Injury: Based on caregiver fall risk screen, instruct family/caregiver to ask for assistance with transferring infant if caregiver noted to have fall risk factors

## 2023-10-14 LAB
ALBUMIN SERPL-MCNC: 3 G/DL (ref 3.5–5.2)
ALP SERPL-CCNC: 66 U/L (ref 35–104)
ALT SERPL-CCNC: 12 U/L (ref 0–32)
ANION GAP SERPL CALCULATED.3IONS-SCNC: 15 MMOL/L (ref 7–16)
AST SERPL-CCNC: 17 U/L (ref 0–31)
BASOPHILS # BLD: 0.06 K/UL (ref 0–0.2)
BASOPHILS NFR BLD: 1 % (ref 0–2)
BILIRUB SERPL-MCNC: 0.3 MG/DL (ref 0–1.2)
BUN SERPL-MCNC: 13 MG/DL (ref 6–23)
CALCIUM SERPL-MCNC: 8.4 MG/DL (ref 8.6–10.2)
CHLORIDE SERPL-SCNC: 101 MMOL/L (ref 98–107)
CO2 SERPL-SCNC: 20 MMOL/L (ref 22–29)
CREAT SERPL-MCNC: 0.6 MG/DL (ref 0.5–1)
EOSINOPHIL # BLD: 0.09 K/UL (ref 0.05–0.5)
EOSINOPHILS RELATIVE PERCENT: 1 % (ref 0–6)
ERYTHROCYTE [DISTWIDTH] IN BLOOD BY AUTOMATED COUNT: 13.2 % (ref 11.5–15)
GFR SERPL CREATININE-BSD FRML MDRD: >60 ML/MIN/1.73M2
GLUCOSE BLD-MCNC: 203 MG/DL (ref 74–99)
GLUCOSE BLD-MCNC: 281 MG/DL (ref 74–99)
GLUCOSE BLD-MCNC: 287 MG/DL (ref 74–99)
GLUCOSE BLD-MCNC: 288 MG/DL (ref 74–99)
GLUCOSE SERPL-MCNC: 255 MG/DL (ref 74–99)
HCT VFR BLD AUTO: 27.4 % (ref 34–48)
HGB BLD-MCNC: 8.8 G/DL (ref 11.5–15.5)
IMM GRANULOCYTES # BLD AUTO: 0.06 K/UL (ref 0–0.58)
IMM GRANULOCYTES NFR BLD: 1 % (ref 0–5)
LYMPHOCYTES NFR BLD: 1.55 K/UL (ref 1.5–4)
LYMPHOCYTES RELATIVE PERCENT: 14 % (ref 20–42)
MCH RBC QN AUTO: 30.3 PG (ref 26–35)
MCHC RBC AUTO-ENTMCNC: 32.1 G/DL (ref 32–34.5)
MCV RBC AUTO: 94.5 FL (ref 80–99.9)
MONOCYTES NFR BLD: 0.99 K/UL (ref 0.1–0.95)
MONOCYTES NFR BLD: 9 % (ref 2–12)
NEUTROPHILS NFR BLD: 76 % (ref 43–80)
NEUTS SEG NFR BLD: 8.48 K/UL (ref 1.8–7.3)
PLATELET # BLD AUTO: 184 K/UL (ref 130–450)
PMV BLD AUTO: 11.2 FL (ref 7–12)
POTASSIUM SERPL-SCNC: 3.6 MMOL/L (ref 3.5–5)
PROT SERPL-MCNC: 5.5 G/DL (ref 6.4–8.3)
RBC # BLD AUTO: 2.9 M/UL (ref 3.5–5.5)
SODIUM SERPL-SCNC: 136 MMOL/L (ref 132–146)
WBC OTHER # BLD: 11.2 K/UL (ref 4.5–11.5)

## 2023-10-14 PROCEDURE — 6360000002 HC RX W HCPCS: Performed by: FAMILY MEDICINE

## 2023-10-14 PROCEDURE — 6370000000 HC RX 637 (ALT 250 FOR IP): Performed by: STUDENT IN AN ORGANIZED HEALTH CARE EDUCATION/TRAINING PROGRAM

## 2023-10-14 PROCEDURE — 6370000000 HC RX 637 (ALT 250 FOR IP): Performed by: FAMILY MEDICINE

## 2023-10-14 PROCEDURE — 80053 COMPREHEN METABOLIC PANEL: CPT

## 2023-10-14 PROCEDURE — 36415 COLL VENOUS BLD VENIPUNCTURE: CPT

## 2023-10-14 PROCEDURE — 2580000003 HC RX 258: Performed by: STUDENT IN AN ORGANIZED HEALTH CARE EDUCATION/TRAINING PROGRAM

## 2023-10-14 PROCEDURE — 82962 GLUCOSE BLOOD TEST: CPT

## 2023-10-14 PROCEDURE — 6360000002 HC RX W HCPCS

## 2023-10-14 PROCEDURE — 1200000000 HC SEMI PRIVATE

## 2023-10-14 PROCEDURE — 85025 COMPLETE CBC W/AUTO DIFF WBC: CPT

## 2023-10-14 RX ORDER — MORPHINE SULFATE 2 MG/ML
2 INJECTION, SOLUTION INTRAMUSCULAR; INTRAVENOUS EVERY 4 HOURS PRN
Status: DISCONTINUED | OUTPATIENT
Start: 2023-10-14 | End: 2023-10-17 | Stop reason: HOSPADM

## 2023-10-14 RX ADMIN — INSULIN LISPRO 4 UNITS: 100 INJECTION, SOLUTION INTRAVENOUS; SUBCUTANEOUS at 17:58

## 2023-10-14 RX ADMIN — ACETAMINOPHEN 650 MG: 325 TABLET ORAL at 06:15

## 2023-10-14 RX ADMIN — PANTOPRAZOLE SODIUM 40 MG: 40 TABLET, DELAYED RELEASE ORAL at 06:13

## 2023-10-14 RX ADMIN — METOPROLOL TARTRATE 25 MG: 25 TABLET, FILM COATED ORAL at 14:23

## 2023-10-14 RX ADMIN — OXYCODONE HYDROCHLORIDE 10 MG: 10 TABLET ORAL at 14:23

## 2023-10-14 RX ADMIN — ACETAMINOPHEN 650 MG: 325 TABLET ORAL at 00:49

## 2023-10-14 RX ADMIN — INSULIN LISPRO 4 UNITS: 100 INJECTION, SOLUTION INTRAVENOUS; SUBCUTANEOUS at 08:00

## 2023-10-14 RX ADMIN — BUPROPION HYDROCHLORIDE 100 MG: 100 TABLET, EXTENDED RELEASE ORAL at 08:01

## 2023-10-14 RX ADMIN — ASPIRIN 325 MG: 325 TABLET, COATED ORAL at 20:04

## 2023-10-14 RX ADMIN — OXYCODONE HYDROCHLORIDE 10 MG: 10 TABLET ORAL at 06:13

## 2023-10-14 RX ADMIN — METOPROLOL TARTRATE 25 MG: 25 TABLET, FILM COATED ORAL at 08:00

## 2023-10-14 RX ADMIN — BUPROPION HYDROCHLORIDE 100 MG: 100 TABLET, EXTENDED RELEASE ORAL at 20:04

## 2023-10-14 RX ADMIN — TRAZODONE HYDROCHLORIDE 50 MG: 50 TABLET ORAL at 20:04

## 2023-10-14 RX ADMIN — KETOROLAC TROMETHAMINE 15 MG: 15 INJECTION, SOLUTION INTRAMUSCULAR; INTRAVENOUS at 00:50

## 2023-10-14 RX ADMIN — Medication 10 ML: at 08:01

## 2023-10-14 RX ADMIN — ACETAMINOPHEN 650 MG: 325 TABLET ORAL at 18:32

## 2023-10-14 RX ADMIN — TOPIRAMATE 50 MG: 25 TABLET, FILM COATED ORAL at 08:00

## 2023-10-14 RX ADMIN — LISINOPRIL 2.5 MG: 2.5 TABLET ORAL at 08:01

## 2023-10-14 RX ADMIN — ROSUVASTATIN 20 MG: 20 TABLET, FILM COATED ORAL at 20:04

## 2023-10-14 RX ADMIN — Medication 10 ML: at 20:15

## 2023-10-14 RX ADMIN — ASPIRIN 325 MG: 325 TABLET, COATED ORAL at 08:00

## 2023-10-14 RX ADMIN — INSULIN LISPRO 2 UNITS: 100 INJECTION, SOLUTION INTRAVENOUS; SUBCUTANEOUS at 12:41

## 2023-10-14 RX ADMIN — MORPHINE SULFATE 2 MG: 2 INJECTION, SOLUTION INTRAMUSCULAR; INTRAVENOUS at 20:05

## 2023-10-14 RX ADMIN — OXYCODONE HYDROCHLORIDE 10 MG: 10 TABLET ORAL at 18:32

## 2023-10-14 RX ADMIN — ACETAMINOPHEN 650 MG: 325 TABLET ORAL at 12:41

## 2023-10-14 RX ADMIN — ACETAMINOPHEN 650 MG: 325 TABLET ORAL at 23:00

## 2023-10-14 RX ADMIN — METOPROLOL TARTRATE 25 MG: 25 TABLET, FILM COATED ORAL at 20:08

## 2023-10-14 RX ADMIN — OXYCODONE HYDROCHLORIDE 10 MG: 10 TABLET ORAL at 22:59

## 2023-10-14 ASSESSMENT — PAIN DESCRIPTION - ORIENTATION
ORIENTATION: RIGHT

## 2023-10-14 ASSESSMENT — PAIN DESCRIPTION - PAIN TYPE
TYPE: SURGICAL PAIN
TYPE: ACUTE PAIN;SURGICAL PAIN
TYPE: SURGICAL PAIN
TYPE: SURGICAL PAIN

## 2023-10-14 ASSESSMENT — PAIN SCALES - GENERAL
PAINLEVEL_OUTOF10: 7
PAINLEVEL_OUTOF10: 8
PAINLEVEL_OUTOF10: 7
PAINLEVEL_OUTOF10: 8
PAINLEVEL_OUTOF10: 7
PAINLEVEL_OUTOF10: 10
PAINLEVEL_OUTOF10: 7
PAINLEVEL_OUTOF10: 9
PAINLEVEL_OUTOF10: 7

## 2023-10-14 ASSESSMENT — PAIN DESCRIPTION - DESCRIPTORS
DESCRIPTORS: CRUSHING;ACHING;DISCOMFORT
DESCRIPTORS: ACHING;SORE;DISCOMFORT
DESCRIPTORS: ACHING;BURNING;SHARP
DESCRIPTORS: ACHING

## 2023-10-14 ASSESSMENT — PAIN DESCRIPTION - LOCATION
LOCATION: LEG
LOCATION: KNEE
LOCATION: LEG
LOCATION: KNEE

## 2023-10-14 ASSESSMENT — PAIN - FUNCTIONAL ASSESSMENT
PAIN_FUNCTIONAL_ASSESSMENT: PREVENTS OR INTERFERES SOME ACTIVE ACTIVITIES AND ADLS

## 2023-10-14 ASSESSMENT — PAIN DESCRIPTION - ONSET
ONSET: ON-GOING

## 2023-10-14 ASSESSMENT — PAIN DESCRIPTION - FREQUENCY
FREQUENCY: CONTINUOUS

## 2023-10-14 NOTE — PLAN OF CARE
Problem: Discharge Planning  Goal: Discharge to home or other facility with appropriate resources  Outcome: Progressing  Flowsheets (Taken 10/13/2023 2045)  Discharge to home or other facility with appropriate resources: Identify barriers to discharge with patient and caregiver     Problem: Pain  Goal: Verbalizes/displays adequate comfort level or baseline comfort level  Outcome: Progressing     Problem: Chronic Conditions and Co-morbidities  Goal: Patient's chronic conditions and co-morbidity symptoms are monitored and maintained or improved  Outcome: Progressing  Flowsheets (Taken 10/13/2023 2045)  Care Plan - Patient's Chronic Conditions and Co-Morbidity Symptoms are Monitored and Maintained or Improved: Monitor and assess patient's chronic conditions and comorbid symptoms for stability, deterioration, or improvement     Problem: Safety - Adult  Goal: Free from fall injury  Outcome: Progressing  Flowsheets (Taken 10/13/2023 2045)  Free From Fall Injury: Based on caregiver fall risk screen, instruct family/caregiver to ask for assistance with transferring infant if caregiver noted to have fall risk factors     Problem: Skin/Tissue Integrity  Goal: Absence of new skin breakdown  Description: 1. Monitor for areas of redness and/or skin breakdown  2. Assess vascular access sites hourly  3. Every 4-6 hours minimum:  Change oxygen saturation probe site  4. Every 4-6 hours:  If on nasal continuous positive airway pressure, respiratory therapy assess nares and determine need for appliance change or resting period.   Outcome: Progressing     Problem: Discharge Planning  Goal: Discharge to home or other facility with appropriate resources  Outcome: Progressing  Flowsheets (Taken 10/13/2023 2045)  Discharge to home or other facility with appropriate resources: Identify barriers to discharge with patient and caregiver     Problem: Pain  Goal: Verbalizes/displays adequate comfort level or baseline comfort level  Outcome:

## 2023-10-15 LAB
ALBUMIN SERPL-MCNC: 3.1 G/DL (ref 3.5–5.2)
ALP SERPL-CCNC: 72 U/L (ref 35–104)
ALT SERPL-CCNC: 14 U/L (ref 0–32)
ANION GAP SERPL CALCULATED.3IONS-SCNC: 14 MMOL/L (ref 7–16)
AST SERPL-CCNC: 17 U/L (ref 0–31)
BASOPHILS # BLD: 0.05 K/UL (ref 0–0.2)
BASOPHILS NFR BLD: 1 % (ref 0–2)
BILIRUB SERPL-MCNC: 0.2 MG/DL (ref 0–1.2)
BUN SERPL-MCNC: 11 MG/DL (ref 6–23)
CALCIUM SERPL-MCNC: 8.4 MG/DL (ref 8.6–10.2)
CHLORIDE SERPL-SCNC: 102 MMOL/L (ref 98–107)
CO2 SERPL-SCNC: 19 MMOL/L (ref 22–29)
CREAT SERPL-MCNC: 0.5 MG/DL (ref 0.5–1)
EOSINOPHIL # BLD: 0.27 K/UL (ref 0.05–0.5)
EOSINOPHILS RELATIVE PERCENT: 3 % (ref 0–6)
ERYTHROCYTE [DISTWIDTH] IN BLOOD BY AUTOMATED COUNT: 13.2 % (ref 11.5–15)
GFR SERPL CREATININE-BSD FRML MDRD: >60 ML/MIN/1.73M2
GLUCOSE BLD-MCNC: 229 MG/DL (ref 74–99)
GLUCOSE BLD-MCNC: 263 MG/DL (ref 74–99)
GLUCOSE BLD-MCNC: 274 MG/DL (ref 74–99)
GLUCOSE BLD-MCNC: 299 MG/DL (ref 74–99)
GLUCOSE SERPL-MCNC: 233 MG/DL (ref 74–99)
HCT VFR BLD AUTO: 27.6 % (ref 34–48)
HGB BLD-MCNC: 8.8 G/DL (ref 11.5–15.5)
IMM GRANULOCYTES # BLD AUTO: 0.06 K/UL (ref 0–0.58)
IMM GRANULOCYTES NFR BLD: 1 % (ref 0–5)
LYMPHOCYTES NFR BLD: 1.38 K/UL (ref 1.5–4)
LYMPHOCYTES RELATIVE PERCENT: 13 % (ref 20–42)
MCH RBC QN AUTO: 30.2 PG (ref 26–35)
MCHC RBC AUTO-ENTMCNC: 31.9 G/DL (ref 32–34.5)
MCV RBC AUTO: 94.8 FL (ref 80–99.9)
MONOCYTES NFR BLD: 0.93 K/UL (ref 0.1–0.95)
MONOCYTES NFR BLD: 9 % (ref 2–12)
NEUTROPHILS NFR BLD: 74 % (ref 43–80)
NEUTS SEG NFR BLD: 7.81 K/UL (ref 1.8–7.3)
PLATELET # BLD AUTO: 182 K/UL (ref 130–450)
PMV BLD AUTO: 11.4 FL (ref 7–12)
POTASSIUM SERPL-SCNC: 3.6 MMOL/L (ref 3.5–5)
PROT SERPL-MCNC: 5.8 G/DL (ref 6.4–8.3)
RBC # BLD AUTO: 2.91 M/UL (ref 3.5–5.5)
SODIUM SERPL-SCNC: 135 MMOL/L (ref 132–146)
WBC OTHER # BLD: 10.5 K/UL (ref 4.5–11.5)

## 2023-10-15 PROCEDURE — 1200000000 HC SEMI PRIVATE

## 2023-10-15 PROCEDURE — 6370000000 HC RX 637 (ALT 250 FOR IP): Performed by: STUDENT IN AN ORGANIZED HEALTH CARE EDUCATION/TRAINING PROGRAM

## 2023-10-15 PROCEDURE — 97530 THERAPEUTIC ACTIVITIES: CPT

## 2023-10-15 PROCEDURE — 80053 COMPREHEN METABOLIC PANEL: CPT

## 2023-10-15 PROCEDURE — 2580000003 HC RX 258: Performed by: STUDENT IN AN ORGANIZED HEALTH CARE EDUCATION/TRAINING PROGRAM

## 2023-10-15 PROCEDURE — 6370000000 HC RX 637 (ALT 250 FOR IP): Performed by: FAMILY MEDICINE

## 2023-10-15 PROCEDURE — 82962 GLUCOSE BLOOD TEST: CPT

## 2023-10-15 PROCEDURE — 36415 COLL VENOUS BLD VENIPUNCTURE: CPT

## 2023-10-15 PROCEDURE — 6360000002 HC RX W HCPCS

## 2023-10-15 PROCEDURE — 85025 COMPLETE CBC W/AUTO DIFF WBC: CPT

## 2023-10-15 RX ADMIN — ASPIRIN 325 MG: 325 TABLET, COATED ORAL at 08:17

## 2023-10-15 RX ADMIN — INSULIN LISPRO 2 UNITS: 100 INJECTION, SOLUTION INTRAVENOUS; SUBCUTANEOUS at 08:17

## 2023-10-15 RX ADMIN — OXYCODONE HYDROCHLORIDE 10 MG: 10 TABLET ORAL at 20:33

## 2023-10-15 RX ADMIN — ACETAMINOPHEN 650 MG: 325 TABLET ORAL at 12:00

## 2023-10-15 RX ADMIN — BUPROPION HYDROCHLORIDE 100 MG: 100 TABLET, EXTENDED RELEASE ORAL at 20:33

## 2023-10-15 RX ADMIN — Medication 10 ML: at 20:34

## 2023-10-15 RX ADMIN — ROSUVASTATIN 20 MG: 20 TABLET, FILM COATED ORAL at 20:33

## 2023-10-15 RX ADMIN — MORPHINE SULFATE 2 MG: 2 INJECTION, SOLUTION INTRAMUSCULAR; INTRAVENOUS at 06:39

## 2023-10-15 RX ADMIN — PANTOPRAZOLE SODIUM 40 MG: 40 TABLET, DELAYED RELEASE ORAL at 05:17

## 2023-10-15 RX ADMIN — ACETAMINOPHEN 650 MG: 325 TABLET ORAL at 05:15

## 2023-10-15 RX ADMIN — Medication 10 ML: at 08:21

## 2023-10-15 RX ADMIN — ACETAMINOPHEN 650 MG: 325 TABLET ORAL at 20:34

## 2023-10-15 RX ADMIN — TRAZODONE HYDROCHLORIDE 50 MG: 50 TABLET ORAL at 20:33

## 2023-10-15 RX ADMIN — METOPROLOL TARTRATE 25 MG: 25 TABLET, FILM COATED ORAL at 16:30

## 2023-10-15 RX ADMIN — OXYCODONE HYDROCHLORIDE 10 MG: 10 TABLET ORAL at 11:59

## 2023-10-15 RX ADMIN — INSULIN LISPRO 4 UNITS: 100 INJECTION, SOLUTION INTRAVENOUS; SUBCUTANEOUS at 17:23

## 2023-10-15 RX ADMIN — METOPROLOL TARTRATE 25 MG: 25 TABLET, FILM COATED ORAL at 20:34

## 2023-10-15 RX ADMIN — ASPIRIN 325 MG: 325 TABLET, COATED ORAL at 20:34

## 2023-10-15 RX ADMIN — LISINOPRIL 2.5 MG: 2.5 TABLET ORAL at 08:19

## 2023-10-15 RX ADMIN — MORPHINE SULFATE 2 MG: 2 INJECTION, SOLUTION INTRAMUSCULAR; INTRAVENOUS at 02:28

## 2023-10-15 RX ADMIN — INSULIN LISPRO 4 UNITS: 100 INJECTION, SOLUTION INTRAVENOUS; SUBCUTANEOUS at 12:00

## 2023-10-15 RX ADMIN — TOPIRAMATE 50 MG: 25 TABLET, FILM COATED ORAL at 08:18

## 2023-10-15 RX ADMIN — OXYCODONE HYDROCHLORIDE 10 MG: 10 TABLET ORAL at 05:15

## 2023-10-15 RX ADMIN — METOPROLOL TARTRATE 25 MG: 25 TABLET, FILM COATED ORAL at 08:17

## 2023-10-15 RX ADMIN — OXYCODONE HYDROCHLORIDE 10 MG: 10 TABLET ORAL at 16:23

## 2023-10-15 RX ADMIN — BUPROPION HYDROCHLORIDE 100 MG: 100 TABLET, EXTENDED RELEASE ORAL at 08:18

## 2023-10-15 ASSESSMENT — PAIN SCALES - GENERAL
PAINLEVEL_OUTOF10: 10
PAINLEVEL_OUTOF10: 9
PAINLEVEL_OUTOF10: 8
PAINLEVEL_OUTOF10: 6
PAINLEVEL_OUTOF10: 7
PAINLEVEL_OUTOF10: 9
PAINLEVEL_OUTOF10: 9
PAINLEVEL_OUTOF10: 8
PAINLEVEL_OUTOF10: 9

## 2023-10-15 ASSESSMENT — PAIN DESCRIPTION - FREQUENCY
FREQUENCY: CONTINUOUS
FREQUENCY: INTERMITTENT

## 2023-10-15 ASSESSMENT — PAIN DESCRIPTION - ORIENTATION
ORIENTATION: RIGHT

## 2023-10-15 ASSESSMENT — PAIN DESCRIPTION - PAIN TYPE
TYPE: ACUTE PAIN;SURGICAL PAIN

## 2023-10-15 ASSESSMENT — PAIN DESCRIPTION - LOCATION
LOCATION: LEG
LOCATION: HIP;LEG
LOCATION: LEG

## 2023-10-15 ASSESSMENT — PAIN DESCRIPTION - DESCRIPTORS
DESCRIPTORS: ACHING;DISCOMFORT;SORE
DESCRIPTORS: ACHING;CRUSHING;SORE
DESCRIPTORS: ACHING;CRUSHING;SORE
DESCRIPTORS: ACHING;DISCOMFORT;SORE

## 2023-10-15 ASSESSMENT — PAIN DESCRIPTION - ONSET
ONSET: ON-GOING

## 2023-10-16 LAB
GLUCOSE BLD-MCNC: 241 MG/DL (ref 74–99)
GLUCOSE BLD-MCNC: 246 MG/DL (ref 74–99)
GLUCOSE BLD-MCNC: 246 MG/DL (ref 74–99)
GLUCOSE BLD-MCNC: 267 MG/DL (ref 74–99)

## 2023-10-16 PROCEDURE — 6360000002 HC RX W HCPCS

## 2023-10-16 PROCEDURE — 82962 GLUCOSE BLOOD TEST: CPT

## 2023-10-16 PROCEDURE — 6370000000 HC RX 637 (ALT 250 FOR IP): Performed by: FAMILY MEDICINE

## 2023-10-16 PROCEDURE — 6370000000 HC RX 637 (ALT 250 FOR IP): Performed by: STUDENT IN AN ORGANIZED HEALTH CARE EDUCATION/TRAINING PROGRAM

## 2023-10-16 PROCEDURE — 2580000003 HC RX 258: Performed by: STUDENT IN AN ORGANIZED HEALTH CARE EDUCATION/TRAINING PROGRAM

## 2023-10-16 PROCEDURE — 6370000000 HC RX 637 (ALT 250 FOR IP): Performed by: INTERNAL MEDICINE

## 2023-10-16 PROCEDURE — 1200000000 HC SEMI PRIVATE

## 2023-10-16 RX ORDER — ENEMA 19; 7 G/133ML; G/133ML
1 ENEMA RECTAL ONCE
Status: COMPLETED | OUTPATIENT
Start: 2023-10-16 | End: 2023-10-16

## 2023-10-16 RX ADMIN — ACETAMINOPHEN 650 MG: 325 TABLET ORAL at 12:36

## 2023-10-16 RX ADMIN — ACETAMINOPHEN 650 MG: 325 TABLET ORAL at 00:28

## 2023-10-16 RX ADMIN — LISINOPRIL 2.5 MG: 2.5 TABLET ORAL at 08:24

## 2023-10-16 RX ADMIN — ROSUVASTATIN 20 MG: 20 TABLET, FILM COATED ORAL at 20:22

## 2023-10-16 RX ADMIN — BUPROPION HYDROCHLORIDE 100 MG: 100 TABLET, EXTENDED RELEASE ORAL at 20:25

## 2023-10-16 RX ADMIN — ASPIRIN 325 MG: 325 TABLET, COATED ORAL at 08:22

## 2023-10-16 RX ADMIN — Medication 1 ENEMA: at 15:01

## 2023-10-16 RX ADMIN — BUPROPION HYDROCHLORIDE 100 MG: 100 TABLET, EXTENDED RELEASE ORAL at 08:23

## 2023-10-16 RX ADMIN — METOPROLOL TARTRATE 25 MG: 25 TABLET, FILM COATED ORAL at 20:22

## 2023-10-16 RX ADMIN — MORPHINE SULFATE 2 MG: 2 INJECTION, SOLUTION INTRAMUSCULAR; INTRAVENOUS at 00:21

## 2023-10-16 RX ADMIN — TOPIRAMATE 50 MG: 25 TABLET, FILM COATED ORAL at 08:23

## 2023-10-16 RX ADMIN — Medication 10 ML: at 20:23

## 2023-10-16 RX ADMIN — OXYCODONE HYDROCHLORIDE 10 MG: 10 TABLET ORAL at 20:22

## 2023-10-16 RX ADMIN — MORPHINE SULFATE 2 MG: 2 INJECTION, SOLUTION INTRAMUSCULAR; INTRAVENOUS at 11:36

## 2023-10-16 RX ADMIN — Medication 10 ML: at 08:24

## 2023-10-16 RX ADMIN — INSULIN LISPRO 2 UNITS: 100 INJECTION, SOLUTION INTRAVENOUS; SUBCUTANEOUS at 17:36

## 2023-10-16 RX ADMIN — INSULIN LISPRO 2 UNITS: 100 INJECTION, SOLUTION INTRAVENOUS; SUBCUTANEOUS at 11:35

## 2023-10-16 RX ADMIN — ASPIRIN 325 MG: 325 TABLET, COATED ORAL at 20:22

## 2023-10-16 RX ADMIN — ACETAMINOPHEN 650 MG: 325 TABLET ORAL at 17:37

## 2023-10-16 RX ADMIN — METOPROLOL TARTRATE 25 MG: 25 TABLET, FILM COATED ORAL at 08:22

## 2023-10-16 RX ADMIN — ACETAMINOPHEN 650 MG: 325 TABLET ORAL at 05:10

## 2023-10-16 RX ADMIN — OXYCODONE HYDROCHLORIDE 10 MG: 10 TABLET ORAL at 08:28

## 2023-10-16 RX ADMIN — TRAZODONE HYDROCHLORIDE 50 MG: 50 TABLET ORAL at 20:22

## 2023-10-16 RX ADMIN — INSULIN LISPRO 4 UNITS: 100 INJECTION, SOLUTION INTRAVENOUS; SUBCUTANEOUS at 08:21

## 2023-10-16 RX ADMIN — PANTOPRAZOLE SODIUM 40 MG: 40 TABLET, DELAYED RELEASE ORAL at 05:10

## 2023-10-16 RX ADMIN — OXYCODONE HYDROCHLORIDE 10 MG: 10 TABLET ORAL at 02:57

## 2023-10-16 RX ADMIN — MORPHINE SULFATE 2 MG: 2 INJECTION, SOLUTION INTRAMUSCULAR; INTRAVENOUS at 05:10

## 2023-10-16 RX ADMIN — OXYCODONE HYDROCHLORIDE 10 MG: 10 TABLET ORAL at 15:04

## 2023-10-16 RX ADMIN — METOPROLOL TARTRATE 25 MG: 25 TABLET, FILM COATED ORAL at 15:04

## 2023-10-16 ASSESSMENT — PAIN SCALES - GENERAL
PAINLEVEL_OUTOF10: 9
PAINLEVEL_OUTOF10: 10
PAINLEVEL_OUTOF10: 1
PAINLEVEL_OUTOF10: 10
PAINLEVEL_OUTOF10: 8
PAINLEVEL_OUTOF10: 6
PAINLEVEL_OUTOF10: 7
PAINLEVEL_OUTOF10: 2
PAINLEVEL_OUTOF10: 2
PAINLEVEL_OUTOF10: 6
PAINLEVEL_OUTOF10: 8
PAINLEVEL_OUTOF10: 8

## 2023-10-16 ASSESSMENT — PAIN DESCRIPTION - DESCRIPTORS
DESCRIPTORS: ACHING;BURNING;THROBBING
DESCRIPTORS: ACHING;THROBBING
DESCRIPTORS: ACHING;DISCOMFORT;SORE
DESCRIPTORS: ACHING;BURNING;THROBBING
DESCRIPTORS: ACHING;DISCOMFORT;SORE
DESCRIPTORS: DISCOMFORT;SORE
DESCRIPTORS: ACHING;DISCOMFORT;SORE

## 2023-10-16 ASSESSMENT — PAIN DESCRIPTION - PAIN TYPE
TYPE: ACUTE PAIN;SURGICAL PAIN

## 2023-10-16 ASSESSMENT — PAIN DESCRIPTION - ORIENTATION
ORIENTATION: RIGHT

## 2023-10-16 ASSESSMENT — PAIN - FUNCTIONAL ASSESSMENT
PAIN_FUNCTIONAL_ASSESSMENT: PREVENTS OR INTERFERES SOME ACTIVE ACTIVITIES AND ADLS

## 2023-10-16 ASSESSMENT — PAIN DESCRIPTION - LOCATION
LOCATION: LEG
LOCATION: LEG
LOCATION: KNEE;LEG
LOCATION: KNEE;LEG
LOCATION: LEG
LOCATION: LEG;KNEE
LOCATION: LEG

## 2023-10-16 ASSESSMENT — PAIN DESCRIPTION - ONSET
ONSET: ON-GOING

## 2023-10-16 ASSESSMENT — PAIN DESCRIPTION - FREQUENCY
FREQUENCY: CONTINUOUS
FREQUENCY: INTERMITTENT
FREQUENCY: INTERMITTENT
FREQUENCY: CONTINUOUS

## 2023-10-16 NOTE — PLAN OF CARE
Problem: Discharge Planning  Goal: Discharge to home or other facility with appropriate resources  Outcome: Progressing     Problem: Pain  Goal: Verbalizes/displays adequate comfort level or baseline comfort level  10/16/2023 1011 by Blake Boyce RN  Outcome: Progressing  10/15/2023 2200 by Jose Carlos Carpenter RN  Outcome: Progressing     Problem: Chronic Conditions and Co-morbidities  Goal: Patient's chronic conditions and co-morbidity symptoms are monitored and maintained or improved  Outcome: Progressing     Problem: Safety - Adult  Goal: Free from fall injury  10/16/2023 1011 by Blake Boyce RN  Outcome: Progressing  10/15/2023 2200 by Jose Carlos Carpenter RN  Outcome: Progressing     Problem: Skin/Tissue Integrity  Goal: Absence of new skin breakdown  Description: 1. Monitor for areas of redness and/or skin breakdown  2. Assess vascular access sites hourly  3. Every 4-6 hours minimum:  Change oxygen saturation probe site  4. Every 4-6 hours:  If on nasal continuous positive airway pressure, respiratory therapy assess nares and determine need for appliance change or resting period.   Outcome: Progressing     Problem: ABCDS Injury Assessment  Goal: Absence of physical injury  Outcome: Progressing

## 2023-10-17 VITALS
BODY MASS INDEX: 28.35 KG/M2 | WEIGHT: 160 LBS | SYSTOLIC BLOOD PRESSURE: 124 MMHG | DIASTOLIC BLOOD PRESSURE: 71 MMHG | HEIGHT: 63 IN | OXYGEN SATURATION: 97 % | TEMPERATURE: 96.2 F | HEART RATE: 87 BPM | RESPIRATION RATE: 16 BRPM

## 2023-10-17 LAB — GLUCOSE BLD-MCNC: 291 MG/DL (ref 74–99)

## 2023-10-17 PROCEDURE — 97530 THERAPEUTIC ACTIVITIES: CPT

## 2023-10-17 PROCEDURE — 82962 GLUCOSE BLOOD TEST: CPT

## 2023-10-17 PROCEDURE — 6370000000 HC RX 637 (ALT 250 FOR IP): Performed by: STUDENT IN AN ORGANIZED HEALTH CARE EDUCATION/TRAINING PROGRAM

## 2023-10-17 PROCEDURE — 97535 SELF CARE MNGMENT TRAINING: CPT

## 2023-10-17 PROCEDURE — 6370000000 HC RX 637 (ALT 250 FOR IP): Performed by: FAMILY MEDICINE

## 2023-10-17 RX ADMIN — OXYCODONE HYDROCHLORIDE 10 MG: 10 TABLET ORAL at 00:09

## 2023-10-17 RX ADMIN — OXYCODONE HYDROCHLORIDE 10 MG: 10 TABLET ORAL at 09:06

## 2023-10-17 RX ADMIN — ACETAMINOPHEN 650 MG: 325 TABLET ORAL at 00:09

## 2023-10-17 RX ADMIN — OXYCODONE HYDROCHLORIDE 10 MG: 10 TABLET ORAL at 04:33

## 2023-10-17 RX ADMIN — ACETAMINOPHEN 650 MG: 325 TABLET ORAL at 05:10

## 2023-10-17 RX ADMIN — ASPIRIN 325 MG: 325 TABLET, COATED ORAL at 09:05

## 2023-10-17 RX ADMIN — INSULIN LISPRO 4 UNITS: 100 INJECTION, SOLUTION INTRAVENOUS; SUBCUTANEOUS at 09:05

## 2023-10-17 RX ADMIN — BUPROPION HYDROCHLORIDE 100 MG: 100 TABLET, EXTENDED RELEASE ORAL at 09:06

## 2023-10-17 RX ADMIN — METOPROLOL TARTRATE 25 MG: 25 TABLET, FILM COATED ORAL at 09:05

## 2023-10-17 RX ADMIN — TOPIRAMATE 50 MG: 25 TABLET, FILM COATED ORAL at 09:06

## 2023-10-17 RX ADMIN — PANTOPRAZOLE SODIUM 40 MG: 40 TABLET, DELAYED RELEASE ORAL at 05:11

## 2023-10-17 RX ADMIN — LISINOPRIL 2.5 MG: 2.5 TABLET ORAL at 09:07

## 2023-10-17 ASSESSMENT — PAIN DESCRIPTION - PAIN TYPE: TYPE: ACUTE PAIN;SURGICAL PAIN

## 2023-10-17 ASSESSMENT — PAIN DESCRIPTION - LOCATION
LOCATION: LEG

## 2023-10-17 ASSESSMENT — PAIN DESCRIPTION - ORIENTATION
ORIENTATION: RIGHT

## 2023-10-17 ASSESSMENT — PAIN DESCRIPTION - DESCRIPTORS
DESCRIPTORS: ACHING;DISCOMFORT;SORE
DESCRIPTORS: ACHING;DISCOMFORT;SORE
DESCRIPTORS: ACHING

## 2023-10-17 ASSESSMENT — PAIN SCALES - GENERAL
PAINLEVEL_OUTOF10: 8
PAINLEVEL_OUTOF10: 5
PAINLEVEL_OUTOF10: 8
PAINLEVEL_OUTOF10: 6
PAINLEVEL_OUTOF10: 8

## 2023-10-17 ASSESSMENT — PAIN DESCRIPTION - ONSET: ONSET: ON-GOING

## 2023-10-17 ASSESSMENT — PAIN DESCRIPTION - FREQUENCY: FREQUENCY: INTERMITTENT

## 2023-10-17 NOTE — CARE COORDINATION
10/17. Authorization received for [employer]. [facility] arranged [facility]. Sister, Eligio Escamilla, facility and nursing notified of the time.  Carlos Larkin RN

## 2023-10-17 NOTE — PLAN OF CARE
Problem: Discharge Planning  Goal: Discharge to home or other facility with appropriate resources  10/16/2023 2120 by Kee Johnston RN  Outcome: Progressing     Problem: Pain  Goal: Verbalizes/displays adequate comfort level or baseline comfort level  10/16/2023 2120 by Kee Johnston RN  Outcome: Progressing     Problem: Safety - Adult  Goal: Free from fall injury  10/16/2023 2120 by Kee Johnston RN  Outcome: Progressing

## 2023-10-20 DIAGNOSIS — T14.8XXA FRACTURE: Primary | ICD-10-CM

## 2023-10-30 ENCOUNTER — HOSPITAL ENCOUNTER (OUTPATIENT)
Dept: GENERAL RADIOLOGY | Age: 68
Discharge: HOME | End: 2023-11-01
Payer: MEDICARE

## 2023-10-30 ENCOUNTER — OFFICE VISIT (OUTPATIENT)
Dept: ORTHOPEDIC SURGERY | Age: 68
End: 2023-10-30
Payer: MEDICARE

## 2023-10-30 DIAGNOSIS — S72.431D CLOSED BICONDYLAR FRACTURE OF DISTAL END OF FEMUR WITH ROUTINE HEALING, RIGHT: Primary | ICD-10-CM

## 2023-10-30 DIAGNOSIS — T14.8XXA FRACTURE: ICD-10-CM

## 2023-10-30 DIAGNOSIS — S72.421D CLOSED BICONDYLAR FRACTURE OF DISTAL END OF FEMUR WITH ROUTINE HEALING, RIGHT: Primary | ICD-10-CM

## 2023-10-30 PROCEDURE — 73552 X-RAY EXAM OF FEMUR 2/>: CPT

## 2023-10-30 PROCEDURE — 99213 OFFICE O/P EST LOW 20 MIN: CPT

## 2023-10-30 PROCEDURE — 99024 POSTOP FOLLOW-UP VISIT: CPT | Performed by: PHYSICIAN ASSISTANT

## 2023-10-30 PROCEDURE — 73560 X-RAY EXAM OF KNEE 1 OR 2: CPT

## 2023-10-30 RX ORDER — ATORVASTATIN CALCIUM 40 MG/1
40 TABLET, FILM COATED ORAL NIGHTLY
COMMUNITY

## 2023-10-30 RX ORDER — OXYCODONE HYDROCHLORIDE AND ACETAMINOPHEN 5; 325 MG/1; MG/1
1 TABLET ORAL EVERY 6 HOURS PRN
COMMUNITY

## 2023-10-30 RX ORDER — DICYCLOMINE HCL 20 MG
20 TABLET ORAL 3 TIMES DAILY
Qty: 90 TABLET | Refills: 0 | Status: SHIPPED | OUTPATIENT
Start: 2023-10-30

## 2023-10-30 RX ORDER — OMEPRAZOLE 20 MG/1
20 CAPSULE, DELAYED RELEASE ORAL DAILY
COMMUNITY

## 2023-10-30 NOTE — PROGRESS NOTES
distal femur fracture. Hardware remains intact without interval displacement, loosening, or failure. No significant change in alignment. No acute fractures or dislocations or any other osseus abnormality identified. Assessment:   Diagnosis Orders   1. Closed bicondylar fracture of distal end of femur with routine healing, right  XR KNEE RIGHT (1-2 VIEWS)    XR FEMUR RIGHT (MIN 2 VIEWS)          Plan:  Reviewed x-rays with patient today in office       Weight Bearing: Non-weight bearing right lower extremity. Use assistive devices when needed. Therapy: Continue PT/OT. Recommend [facility] PT at discharge. Okay for discharge from othopedic standpoint as long as she can transfer and perform ADLs effectively while maintaining NWB status on R LE. Pain control: per facility physician    Incisional Care: sutures removed today in office. Steri strips placed. Steri strips can be removed in 7-10 days if they do not fall off sooner. Continue to monitor for signs or symptoms of infection until skin has completely healed. Recommend thin layer of Vaseline 1-2x daily over incision line to aid in healing. Okay to shower. No scrubbing near incision until skin has completely healed. Thoroughly pat dry with clean towel. DVT Prophylaxis: Continue with Aspirin 325mg BID. Should continue this until allowed to bear weight on R LE. Follow up: approximately 3-4 weeks with orthopedics. Call with any questions or concerns. [phone]    Electronically signed by Lien Bishop PA-C on 10/30/2023 at 1:52 PM  Note: This report was completed using "[employer] voiced recognition software. Every effort has been made to ensure accuracy; however, inadvertent computerized transcription errors may be present.

## 2023-10-30 NOTE — PATIENT INSTRUCTIONS
INSTRUCTIONS FOR FACILITY      Weight Bearing: Non-weight bearing right lower extremity. Use assistive devices when needed. Therapy: Continue PT/OT. Recommend [facility] PT at discharge. Okay for discharge from othopedic standpoint as long as she can transfer and perform ADLs effectively while maintaining NWB status on R LE. Pain control: per facility physician    Incisional Care: sutures removed today in office. Steri strips placed. Steri strips can be removed in 7-10 days if they do not fall off sooner. Continue to monitor for signs or symptoms of infection until skin has completely healed. Recommend thin layer of Vaseline 1-2x daily over incision line to aid in healing. Okay to shower. No scrubbing near incision until skin has completely healed. Thoroughly pat dry with clean towel. DVT Prophylaxis: Continue with Aspirin 325mg BID. Should continue this until allowed to bear weight on R LE. Follow up: approximately 3-4 weeks with orthopedics. Call with any questions or concerns.    [phone]
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
## 2023-11-16 ENCOUNTER — TELEPHONE (OUTPATIENT)
Dept: PRIMARY CARE CLINIC | Age: 68
End: 2023-11-16

## 2023-11-16 DIAGNOSIS — S72.421A CLOSED BICONDYLAR FRACTURE OF DISTAL FEMUR, RIGHT, INITIAL ENCOUNTER (HCC): Primary | ICD-10-CM

## 2023-11-16 DIAGNOSIS — Z98.890 S/P ORIF (OPEN REDUCTION INTERNAL FIXATION) FRACTURE: ICD-10-CM

## 2023-11-16 DIAGNOSIS — S72.431A CLOSED BICONDYLAR FRACTURE OF DISTAL FEMUR, RIGHT, INITIAL ENCOUNTER (HCC): Primary | ICD-10-CM

## 2023-11-16 DIAGNOSIS — Z87.81 S/P ORIF (OPEN REDUCTION INTERNAL FIXATION) FRACTURE: ICD-10-CM

## 2023-11-20 RX ORDER — ROSUVASTATIN CALCIUM 20 MG/1
20 TABLET, COATED ORAL DAILY
Qty: 90 TABLET | Refills: 0 | Status: SHIPPED | OUTPATIENT
Start: 2023-11-20

## 2023-11-21 ENCOUNTER — TELEPHONE (OUTPATIENT)
Dept: ORTHOPEDIC SURGERY | Age: 68
End: 2023-11-21

## 2023-11-21 RX ORDER — LISINOPRIL 2.5 MG/1
2.5 TABLET ORAL DAILY
Qty: 90 TABLET | OUTPATIENT
Start: 2023-11-21

## 2023-11-29 DIAGNOSIS — S72.421D CLOSED BICONDYLAR FRACTURE OF DISTAL END OF FEMUR WITH ROUTINE HEALING, RIGHT: ICD-10-CM

## 2023-11-29 DIAGNOSIS — S72.431D CLOSED BICONDYLAR FRACTURE OF DISTAL END OF FEMUR WITH ROUTINE HEALING, RIGHT: ICD-10-CM

## 2023-11-29 RX ORDER — OXYCODONE HYDROCHLORIDE AND ACETAMINOPHEN 5; 325 MG/1; MG/1
1 TABLET ORAL DAILY PRN
Qty: 7 TABLET | Refills: 0 | Status: SHIPPED | OUTPATIENT
Start: 2023-11-29 | End: 2023-12-06

## 2023-11-29 NOTE — TELEPHONE ENCOUNTER
Percocet refilled and weaned to once daily. Last narcotic refill    Controlled Substance Monitoring:    Acute and Chronic Pain Monitoring:   RX Monitoring Periodic Controlled Substance Monitoring   11/29/2023   2:47 PM No signs of potential drug abuse or diversion identified.

## 2023-11-29 NOTE — TELEPHONE ENCOUNTER
Received call from caregiver, Constanza Mayer with [facility] requesting refill of Percocet 5/325 mg.    OP:SURGEON: Dr. Katy Garza MD  DATE OF PROCEDURE: 10/12/23  PROCEDURE:Open reduction internal fixation right supracondylar femur fracture with retrograde intramedullary nail and locking attachment washer    Weeks from Surgery: 7 weeks  Last fill was 11/17/2023 at [facility]    Last OV: 10/30/2023 (was to be seen 11/27 but had to be rescheduled due to provider availability)    Medication pended and routed to providers for decision and signature.     Future Appointments   Date Time Provider [address]   12/12/2023  2:45 PM Domingo Galan MD [facility]   12/19/2023  2:15 PM Emy Hoover MD [facility]   5/28/2024  3:00 PM Emy Hoover MD [facility] [address]       Electronically signed by Bela Soto ATC on 11/29/2023 at 12:38 PM

## 2023-12-11 DIAGNOSIS — S72.421D CLOSED BICONDYLAR FRACTURE OF DISTAL END OF FEMUR WITH ROUTINE HEALING, RIGHT: Primary | ICD-10-CM

## 2023-12-11 DIAGNOSIS — S72.431D CLOSED BICONDYLAR FRACTURE OF DISTAL END OF FEMUR WITH ROUTINE HEALING, RIGHT: Primary | ICD-10-CM

## 2024-01-02 ENCOUNTER — OFFICE VISIT (OUTPATIENT)
Dept: ORTHOPEDIC SURGERY | Age: 69
End: 2024-01-02

## 2024-01-02 VITALS — BODY MASS INDEX: 28.35 KG/M2 | HEIGHT: 63 IN | WEIGHT: 160 LBS

## 2024-01-02 DIAGNOSIS — S72.421D CLOSED BICONDYLAR FRACTURE OF DISTAL END OF FEMUR WITH ROUTINE HEALING, RIGHT: Primary | ICD-10-CM

## 2024-01-02 DIAGNOSIS — M90.869: ICD-10-CM

## 2024-01-02 DIAGNOSIS — S72.431D CLOSED BICONDYLAR FRACTURE OF DISTAL END OF FEMUR WITH ROUTINE HEALING, RIGHT: Primary | ICD-10-CM

## 2024-01-02 DIAGNOSIS — E11.9 TYPE 2 DIABETES MELLITUS WITHOUT COMPLICATION, UNSPECIFIED WHETHER LONG TERM INSULIN USE (HCC): ICD-10-CM

## 2024-01-02 DIAGNOSIS — E08.618: ICD-10-CM

## 2024-01-02 DIAGNOSIS — M89.9 DISORDER OF BONE, UNSPECIFIED: ICD-10-CM

## 2024-01-02 PROCEDURE — 99024 POSTOP FOLLOW-UP VISIT: CPT | Performed by: PHYSICIAN ASSISTANT

## 2024-01-02 RX ORDER — BUPROPION HYDROCHLORIDE 100 MG/1
100 TABLET ORAL 2 TIMES DAILY
COMMUNITY
Start: 2023-11-14

## 2024-01-02 RX ORDER — IBUPROFEN 800 MG/1
800 TABLET ORAL 3 TIMES DAILY
COMMUNITY
Start: 2023-11-22

## 2024-01-02 NOTE — PROGRESS NOTES
Chief Complaint   Patient presents with    Post-Op Check      FU Rt distal femur ORIF 10/12/2023.  Has cancelled post-op appts due to transportation issues.  States she is PWB RLE       OP:SURGEON: Dr. Lio Galan MD  DATE OF PROCEDURE: 10/12/23  PROCEDURE:Open reduction internal fixation right supracondylar femur fracture with retrograde intramedullary nail and locking attachment washer    Subjective:  Beata Butts is approximately 10 weeks follow-up from the above surgery. She is mostly NWB. Presents with walker and hinged knee brace set 0-10 degrees today. C PT has paused due to NWB status. She smokes about 0.5 PPD and has hx of DM. Taking ASA 81mg BID for DVT ppx.  Denies calf pain, CP, SOB, fever, chills, myalgias.    Review of Systems -  all pertinent positives and negatives in HPI.      Objective:    General: Alert and oriented X 3, normocephalic atraumatic, external ears and eye normal, sclera clear, no acute distress, respirations easy and unlabored with no audible wheezes, skin warm and dry, speech and dress appropriate for noted age, affect euthymic.    Extremity:  Right Lower Extremity  Skin clean dry and intact, without signs of infection  Mild TTP throughout the distal lateral thigh. No skin tenting or blanching   Very limited AROM of the knee due to stiffness   Compartments supple throughout thigh and leg  Calf supple and nontender  Demonstrates active knee flexion/extension, ankle plantar/dorsiflexion/great toe extension.   States sensation intact to touch in sural/deep peroneal/superficial peroneal/saphenous/posterior tibial nerve distributions to foot/ankle.   Palpable dorsalis pedis and posterior tibialis pulses, cap refill brisk in toes, foot warm/perfused.           XR:   Multiple views of R knee and femur demonstrating no significant interval healing of the distal femur fracture. Lateral plate possibly fractured with screw back out. Femoral intramedullary corey remains intact without

## 2024-01-04 ENCOUNTER — TELEPHONE (OUTPATIENT)
Dept: ORTHOPEDIC SURGERY | Age: 69
End: 2024-01-04

## 2024-01-04 DIAGNOSIS — S72.421D CLOSED BICONDYLAR FRACTURE OF DISTAL END OF FEMUR WITH ROUTINE HEALING, RIGHT: ICD-10-CM

## 2024-01-04 DIAGNOSIS — M90.869: ICD-10-CM

## 2024-01-04 DIAGNOSIS — S72.431D CLOSED BICONDYLAR FRACTURE OF DISTAL END OF FEMUR WITH ROUTINE HEALING, RIGHT: ICD-10-CM

## 2024-01-04 DIAGNOSIS — M89.9 DISORDER OF BONE, UNSPECIFIED: Primary | ICD-10-CM

## 2024-01-04 NOTE — TELEPHONE ENCOUNTER
Patient called.  Needs new CT order for Femur. Should be Right Femur. [facility] will not schedule without correction.    Also patient asking if she is also to have CT of Rt knee.  Will Rt knee be included in CT of Right Femur

## 2024-01-04 NOTE — TELEPHONE ENCOUNTER
CT order changed to R femur, thank you! We should be able to image the entire femur with just a femur CT, no need to include knee CT as well.

## 2024-01-05 DIAGNOSIS — S72.421A CLOSED BICONDYLAR FRACTURE OF DISTAL FEMUR, RIGHT, INITIAL ENCOUNTER (HCC): Primary | ICD-10-CM

## 2024-01-05 DIAGNOSIS — S72.431A CLOSED BICONDYLAR FRACTURE OF DISTAL FEMUR, RIGHT, INITIAL ENCOUNTER (HCC): Primary | ICD-10-CM

## 2024-01-08 NOTE — TELEPHONE ENCOUNTER
Placed new order for CT Scan Right Femur.   Only saw order for XR Rt femur dated 1/4/2024.  Order pended.  Thanks.

## 2024-01-09 ENCOUNTER — OFFICE VISIT (OUTPATIENT)
Dept: PRIMARY CARE CLINIC | Age: 69
End: 2024-01-09
Payer: MEDICARE

## 2024-01-09 VITALS
TEMPERATURE: 97.1 F | WEIGHT: 156 LBS | HEIGHT: 63 IN | BODY MASS INDEX: 27.64 KG/M2 | SYSTOLIC BLOOD PRESSURE: 118 MMHG | DIASTOLIC BLOOD PRESSURE: 60 MMHG

## 2024-01-09 DIAGNOSIS — S72.421D CLOSED BICONDYLAR FRACTURE OF DISTAL END OF FEMUR WITH ROUTINE HEALING, RIGHT: ICD-10-CM

## 2024-01-09 DIAGNOSIS — S72.91XG CLOSED FRACTURE OF RIGHT FEMUR WITH DELAYED HEALING, UNSPECIFIED FRACTURE MORPHOLOGY, UNSPECIFIED PORTION OF FEMUR, SUBSEQUENT ENCOUNTER: ICD-10-CM

## 2024-01-09 DIAGNOSIS — E11.9 TYPE 2 DIABETES MELLITUS WITHOUT COMPLICATION, UNSPECIFIED WHETHER LONG TERM INSULIN USE (HCC): ICD-10-CM

## 2024-01-09 DIAGNOSIS — I10 ESSENTIAL HYPERTENSION: Primary | ICD-10-CM

## 2024-01-09 DIAGNOSIS — M89.9 DISORDER OF BONE, UNSPECIFIED: ICD-10-CM

## 2024-01-09 DIAGNOSIS — S72.431D CLOSED BICONDYLAR FRACTURE OF DISTAL END OF FEMUR WITH ROUTINE HEALING, RIGHT: ICD-10-CM

## 2024-01-09 DIAGNOSIS — E11.9 TYPE 2 DIABETES MELLITUS WITHOUT COMPLICATION, WITHOUT LONG-TERM CURRENT USE OF INSULIN (HCC): ICD-10-CM

## 2024-01-09 DIAGNOSIS — E08.618: ICD-10-CM

## 2024-01-09 DIAGNOSIS — E78.5 SERUM LIPIDS HIGH: ICD-10-CM

## 2024-01-09 DIAGNOSIS — M90.869: ICD-10-CM

## 2024-01-09 LAB
ABSOLUTE IMMATURE GRANULOCYTE: 0.06 K/UL (ref 0–0.58)
ALBUMIN SERPL-MCNC: 4.5 G/DL (ref 3.5–5.2)
ALP BLD-CCNC: 99 U/L (ref 35–104)
ALT SERPL-CCNC: 16 U/L (ref 0–32)
ANION GAP SERPL CALCULATED.3IONS-SCNC: 16 MMOL/L (ref 7–16)
AST SERPL-CCNC: 14 U/L (ref 0–31)
BASOPHILS ABSOLUTE: 0.13 K/UL (ref 0–0.2)
BASOPHILS RELATIVE PERCENT: 1 % (ref 0–2)
BILIRUB SERPL-MCNC: <0.2 MG/DL (ref 0–1.2)
BUN BLDV-MCNC: 17 MG/DL (ref 6–23)
C-REACTIVE PROTEIN: 11 MG/L (ref 0–5)
CALCIUM SERPL-MCNC: 9.5 MG/DL (ref 8.6–10.2)
CHLORIDE BLD-SCNC: 97 MMOL/L (ref 98–107)
CO2: 22 MMOL/L (ref 22–29)
CREAT SERPL-MCNC: 0.7 MG/DL (ref 0.5–1)
EOSINOPHILS ABSOLUTE: 0.71 K/UL (ref 0.05–0.5)
EOSINOPHILS RELATIVE PERCENT: 6 % (ref 0–6)
GFR SERPL CREATININE-BSD FRML MDRD: >60 ML/MIN/1.73M2
GLUCOSE BLD-MCNC: 79 MG/DL (ref 74–99)
HBA1C MFR BLD: 6.6 % (ref 4–5.6)
HCT VFR BLD CALC: 42.4 % (ref 34–48)
HEMOGLOBIN: 13.3 G/DL (ref 11.5–15.5)
IMMATURE GRANULOCYTES: 1 % (ref 0–5)
LYMPHOCYTES ABSOLUTE: 2.54 K/UL (ref 1.5–4)
LYMPHOCYTES RELATIVE PERCENT: 20 % (ref 20–42)
MCH RBC QN AUTO: 29.5 PG (ref 26–35)
MCHC RBC AUTO-ENTMCNC: 31.4 G/DL (ref 32–34.5)
MCV RBC AUTO: 94 FL (ref 80–99.9)
MONOCYTES ABSOLUTE: 0.93 K/UL (ref 0.1–0.95)
MONOCYTES RELATIVE PERCENT: 7 % (ref 2–12)
NEUTROPHILS ABSOLUTE: 8.6 K/UL (ref 1.8–7.3)
NEUTROPHILS RELATIVE PERCENT: 66 % (ref 43–80)
PDW BLD-RTO: 14.1 % (ref 11.5–15)
PLATELET # BLD: 282 K/UL (ref 130–450)
PMV BLD AUTO: 11.7 FL (ref 7–12)
POTASSIUM SERPL-SCNC: 4.4 MMOL/L (ref 3.5–5)
PTH INTACT: 9.1 PG/ML (ref 15–65)
RBC # BLD: 4.51 M/UL (ref 3.5–5.5)
SEDIMENTATION RATE, ERYTHROCYTE: 15 MM/HR (ref 0–20)
SODIUM BLD-SCNC: 135 MMOL/L (ref 132–146)
TOTAL PROTEIN: 7.6 G/DL (ref 6.4–8.3)
TSH SERPL DL<=0.05 MIU/L-ACNC: 1.41 UIU/ML (ref 0.27–4.2)
VITAMIN D 25-HYDROXY: 77.5 NG/ML (ref 30–100)
WBC # BLD: 13 K/UL (ref 4.5–11.5)

## 2024-01-09 PROCEDURE — 99214 OFFICE O/P EST MOD 30 MIN: CPT | Performed by: INTERNAL MEDICINE

## 2024-01-09 PROCEDURE — 3074F SYST BP LT 130 MM HG: CPT | Performed by: INTERNAL MEDICINE

## 2024-01-09 PROCEDURE — 3078F DIAST BP <80 MM HG: CPT | Performed by: INTERNAL MEDICINE

## 2024-01-09 PROCEDURE — 1123F ACP DISCUSS/DSCN MKR DOCD: CPT | Performed by: INTERNAL MEDICINE

## 2024-01-09 PROCEDURE — 3044F HG A1C LEVEL LT 7.0%: CPT | Performed by: INTERNAL MEDICINE

## 2024-01-09 RX ORDER — LISINOPRIL 2.5 MG/1
2.5 TABLET ORAL NIGHTLY
Qty: 90 TABLET | Refills: 0 | Status: SHIPPED | OUTPATIENT
Start: 2024-01-09

## 2024-01-09 ASSESSMENT — ENCOUNTER SYMPTOMS
TROUBLE SWALLOWING: 0
ABDOMINAL DISTENTION: 0
ALLERGIC/IMMUNOLOGIC NEGATIVE: 1
COLOR CHANGE: 0
CONSTIPATION: 0
RHINORRHEA: 0
SINUS PRESSURE: 0
BACK PAIN: 0
NAUSEA: 0
ABDOMINAL PAIN: 0
SORE THROAT: 0
BLOOD IN STOOL: 0
DIARRHEA: 0
VOMITING: 0

## 2024-01-09 ASSESSMENT — PATIENT HEALTH QUESTIONNAIRE - PHQ9
8. MOVING OR SPEAKING SO SLOWLY THAT OTHER PEOPLE COULD HAVE NOTICED. OR THE OPPOSITE, BEING SO FIGETY OR RESTLESS THAT YOU HAVE BEEN MOVING AROUND A LOT MORE THAN USUAL: 0
7. TROUBLE CONCENTRATING ON THINGS, SUCH AS READING THE NEWSPAPER OR WATCHING TELEVISION: 1
4. FEELING TIRED OR HAVING LITTLE ENERGY: 0
3. TROUBLE FALLING OR STAYING ASLEEP: 1
9. THOUGHTS THAT YOU WOULD BE BETTER OFF DEAD, OR OF HURTING YOURSELF: 0
6. FEELING BAD ABOUT YOURSELF - OR THAT YOU ARE A FAILURE OR HAVE LET YOURSELF OR YOUR FAMILY DOWN: 0
SUM OF ALL RESPONSES TO PHQ QUESTIONS 1-9: 5
SUM OF ALL RESPONSES TO PHQ QUESTIONS 1-9: 5
2. FEELING DOWN, DEPRESSED OR HOPELESS: 1
SUM OF ALL RESPONSES TO PHQ QUESTIONS 1-9: 5
SUM OF ALL RESPONSES TO PHQ9 QUESTIONS 1 & 2: 2
SUM OF ALL RESPONSES TO PHQ QUESTIONS 1-9: 5
1. LITTLE INTEREST OR PLEASURE IN DOING THINGS: 1
5. POOR APPETITE OR OVEREATING: 1
10. IF YOU CHECKED OFF ANY PROBLEMS, HOW DIFFICULT HAVE THESE PROBLEMS MADE IT FOR YOU TO DO YOUR WORK, TAKE CARE OF THINGS AT HOME, OR GET ALONG WITH OTHER PEOPLE: 0

## 2024-01-09 NOTE — PROGRESS NOTES
Beata Butts presents today for   Follow up of Diabetes, HTN, High chol, Fibromyalgia, Migraine Headaches  Current Outpatient Medications   Medication Sig Dispense Refill    lisinopril (PRINIVIL;ZESTRIL) 2.5 MG tablet Take 1 tablet by mouth at bedtime 90 tablet 0    buPROPion (WELLBUTRIN) 100 MG tablet Take 1 tablet by mouth 2 times daily      topiramate (TOPAMAX) 50 MG tablet TAKE 1 TABLET BY MOUTH DAILY 90 tablet 0    spironolactone-hydroCHLOROthiazide (ALDACTAZIDE) 25-25 MG per tablet TAKE 1 TABLET BY MOUTH DAILY 90 tablet 0    metFORMIN (GLUCOPHAGE) 1000 MG tablet TAKE 1 TABLET(1000 MG) BY MOUTH TWICE DAILY WITH MEALS 180 tablet 0    gabapentin (NEURONTIN) 400 MG capsule Take 1 capsule by mouth 3 times daily for 90 days. 270 capsule 0    famotidine (PEPCID) 40 MG tablet TAKE 1 TABLET BY MOUTH EVERY DAY 90 tablet 0    cyclobenzaprine (FLEXERIL) 10 MG tablet Take 1 tablet by mouth in the morning, at noon, and at bedtime 270 tablet 0    rosuvastatin (CRESTOR) 20 MG tablet TAKE 1 TABLET BY MOUTH DAILY 90 tablet 0    aspirin 81 MG EC tablet Take 1 tablet by mouth in the morning and 1 tablet in the evening. 60 tablet 2    metoprolol tartrate (LOPRESSOR) 25 MG tablet TAKE 1 TABLET(25 MG) BY MOUTH THREE TIMES DAILY WITH FOOD 270 tablet 0    dicyclomine (BENTYL) 20 MG tablet TAKE 1 TABLET BY MOUTH THREE TIMES DAILY 90 tablet 0    omeprazole (PRILOSEC) 20 MG delayed release capsule Take 1 capsule by mouth daily      atorvastatin (LIPITOR) 40 MG tablet Take 1 tablet by mouth nightly      glipiZIDE (GLUCOTROL) 10 MG tablet Take 1 tablet by mouth 2 times daily (before meals)      traZODone (DESYREL) 50 MG tablet Take 1 tablet by mouth nightly as needed for Sleep      ipratropium (ATROVENT HFA) 17 MCG/ACT inhaler Inhale 2 puffs into the lungs every 4 hours as needed for Wheezing      ammonium lactate (AMLACTIN) 12 % cream Apply topically as needed for Dry Skin      buPROPion (WELLBUTRIN SR) 100 MG extended release

## 2024-01-10 NOTE — TELEPHONE ENCOUNTER
Informed patient order for CT Scan Rt femur placed.  She can call [facility] for an appt @ [phone].

## 2024-02-07 ENCOUNTER — HOSPITAL ENCOUNTER (OUTPATIENT)
Dept: CT IMAGING | Age: 69
Discharge: HOME | End: 2024-02-09
Payer: COMMERCIAL

## 2024-02-07 DIAGNOSIS — S72.421D CLOSED BICONDYLAR FRACTURE OF DISTAL END OF FEMUR WITH ROUTINE HEALING, RIGHT: ICD-10-CM

## 2024-02-07 DIAGNOSIS — M89.9 DISORDER OF BONE, UNSPECIFIED: ICD-10-CM

## 2024-02-07 DIAGNOSIS — E08.618: ICD-10-CM

## 2024-02-07 DIAGNOSIS — S72.431D CLOSED BICONDYLAR FRACTURE OF DISTAL END OF FEMUR WITH ROUTINE HEALING, RIGHT: ICD-10-CM

## 2024-02-07 DIAGNOSIS — E11.9 TYPE 2 DIABETES MELLITUS WITHOUT COMPLICATION, UNSPECIFIED WHETHER LONG TERM INSULIN USE (HCC): ICD-10-CM

## 2024-02-07 DIAGNOSIS — M90.869: ICD-10-CM

## 2024-02-07 PROCEDURE — 73700 CT LOWER EXTREMITY W/O DYE: CPT

## 2024-02-09 RX ORDER — GLIPIZIDE 10 MG/1
TABLET ORAL
Qty: 180 TABLET | Refills: 0 | Status: SHIPPED | OUTPATIENT
Start: 2024-02-09

## 2024-02-13 ENCOUNTER — OFFICE VISIT (OUTPATIENT)
Dept: ORTHOPEDIC SURGERY | Age: 69
End: 2024-02-13
Payer: COMMERCIAL

## 2024-02-13 DIAGNOSIS — S72.431D CLOSED BICONDYLAR FRACTURE OF DISTAL END OF FEMUR WITH ROUTINE HEALING, RIGHT: Primary | ICD-10-CM

## 2024-02-13 DIAGNOSIS — S72.421D CLOSED BICONDYLAR FRACTURE OF DISTAL END OF FEMUR WITH ROUTINE HEALING, RIGHT: Primary | ICD-10-CM

## 2024-02-13 PROCEDURE — 1090F PRES/ABSN URINE INCON ASSESS: CPT | Performed by: ORTHOPAEDIC SURGERY

## 2024-02-13 PROCEDURE — G8484 FLU IMMUNIZE NO ADMIN: HCPCS | Performed by: ORTHOPAEDIC SURGERY

## 2024-02-13 PROCEDURE — 4004F PT TOBACCO SCREEN RCVD TLK: CPT | Performed by: ORTHOPAEDIC SURGERY

## 2024-02-13 PROCEDURE — G8419 CALC BMI OUT NRM PARAM NOF/U: HCPCS | Performed by: ORTHOPAEDIC SURGERY

## 2024-02-13 PROCEDURE — 3017F COLORECTAL CA SCREEN DOC REV: CPT | Performed by: ORTHOPAEDIC SURGERY

## 2024-02-13 PROCEDURE — 99213 OFFICE O/P EST LOW 20 MIN: CPT | Performed by: ORTHOPAEDIC SURGERY

## 2024-02-13 PROCEDURE — 1123F ACP DISCUSS/DSCN MKR DOCD: CPT | Performed by: ORTHOPAEDIC SURGERY

## 2024-02-13 PROCEDURE — G8427 DOCREV CUR MEDS BY ELIG CLIN: HCPCS | Performed by: ORTHOPAEDIC SURGERY

## 2024-02-13 PROCEDURE — G8400 PT W/DXA NO RESULTS DOC: HCPCS | Performed by: ORTHOPAEDIC SURGERY

## 2024-02-13 RX ORDER — ROSUVASTATIN CALCIUM 20 MG/1
20 TABLET, COATED ORAL DAILY
Qty: 90 TABLET | Refills: 0 | Status: SHIPPED | OUTPATIENT
Start: 2024-02-13

## 2024-02-13 NOTE — PROGRESS NOTES
Chief Complaint   Patient presents with    Follow-up     CT right femur 2/12/24       SUBJECTIVE: Patient is approxi-4 months out from right distal femur open reduction internal fixation with a locking attachment washer.  She had a recent CT showing some healing although she still has not completely united her distal femur.  Her alignment remains near-anatomic.  She is having some pain although does place some weight on which she gets around the house.  She utilizes a walker for ambulation.  She denies any further injury.  She denies any redness or drainage.  She is already taking vitamin D at home.  She has not been started on a bone stimulator.  She denies any fevers or chills          Past Medical History:   Diagnosis Date    Anemia     COPD (chronic obstructive pulmonary disease) (HCC)     Diabetes mellitus (HCC)     Fibromyalgia     GERD (gastroesophageal reflux disease)     Hx of migraine headaches     Hyperlipidemia     Hypertension     Major depressive disorder     Polycythemia vera (HCC)     Raynaud disease        Past Surgical History:   Procedure Laterality Date    APPENDECTOMY      CHOLECYSTECTOMY      COLONOSCOPY N/A 2/4/2019    COLONOSCOPY DIAGNOSTIC    ++ISOLATION++ performed by Xenia Anderson MD at [facility] ENDOSCOPY    FEMUR FRACTURE SURGERY Right 10/12/2023    RIGHT DISTAL FEMUR OPEN REDUCTION INTERNAL FIXATION performed by Lio Galan MD at [facility] OR    HYSTERECTOMY (CERVIX STATUS UNKNOWN)      UPPER GASTROINTESTINAL ENDOSCOPY N/A 2/4/2019    EGD BIOPSY performed by Xenia Anderson MD at [facility] ENDOSCOPY       History reviewed. No pertinent family history.    Social History     Tobacco Use    Smoking status: Every Day     Current packs/day: 0.50     Average packs/day: 0.5 packs/day for 50.1 years (25.1 ttl pk-yrs)     Types: Cigarettes     Start date: 1/1/1974     Passive exposure: Never    Smokeless tobacco: Never   Substance Use Topics    Alcohol use: No    Drug use: No       No Known

## 2024-02-13 NOTE — PATIENT INSTRUCTIONS
Weight-bear as tolerated, let pain be your guide    May discontinue right knee hinged brace    Continue taking vitamin D    Bone stimulator ordered    Follow-up here in 8 weeks, call sooner with any questions, concerns, or worsening of symptoms

## 2024-02-19 ENCOUNTER — TELEPHONE (OUTPATIENT)
Dept: PRIMARY CARE CLINIC | Age: 69
End: 2024-02-19

## 2024-02-19 NOTE — TELEPHONE ENCOUNTER
----- Message from Yuko Sim sent at 2/19/2024  3:33 PM EST -----  Subject: Message to Provider    QUESTIONS  Information for Provider? Pt is Milad's call to scheduled a bone density   test. Please call Pt back.  ---------------------------------------------------------------------------  --------------  CALL BACK INFO  [phone]; OK to leave message on voicemail  ---------------------------------------------------------------------------  --------------  SCRIPT ANSWERS  undefined

## 2024-02-22 NOTE — TELEPHONE ENCOUNTER
Called Beata and gave her the number to call [facility] to set up a bone density scan. Thank you.

## 2024-03-12 RX ORDER — SPIRONOLACTONE AND HYDROCHLOROTHIAZIDE 25; 25 MG/1; MG/1
1 TABLET ORAL DAILY
Qty: 90 TABLET | Refills: 0 | Status: SHIPPED | OUTPATIENT
Start: 2024-03-12

## 2024-03-12 RX ORDER — PANTOPRAZOLE SODIUM 40 MG/1
40 TABLET, DELAYED RELEASE ORAL EVERY MORNING
Qty: 90 TABLET | OUTPATIENT
Start: 2024-03-12

## 2024-03-12 RX ORDER — CYCLOBENZAPRINE HCL 10 MG
TABLET ORAL
Qty: 270 TABLET | Refills: 0 | Status: SHIPPED | OUTPATIENT
Start: 2024-03-12

## 2024-03-12 RX ORDER — GABAPENTIN 400 MG/1
400 CAPSULE ORAL 3 TIMES DAILY
Qty: 270 CAPSULE | Refills: 0 | Status: SHIPPED | OUTPATIENT
Start: 2024-03-12 | End: 2024-06-10

## 2024-03-12 RX ORDER — BUPROPION HYDROCHLORIDE 100 MG/1
TABLET, EXTENDED RELEASE ORAL
Qty: 180 TABLET | Refills: 0 | OUTPATIENT
Start: 2024-03-12

## 2024-03-12 RX ORDER — TOPIRAMATE 50 MG/1
50 TABLET, FILM COATED ORAL DAILY
Qty: 90 TABLET | Refills: 0 | Status: SHIPPED | OUTPATIENT
Start: 2024-03-12

## 2024-03-26 ENCOUNTER — OFFICE VISIT (OUTPATIENT)
Dept: ORTHOPEDIC SURGERY | Age: 69
End: 2024-03-26
Payer: COMMERCIAL

## 2024-03-26 ENCOUNTER — OFFICE VISIT (OUTPATIENT)
Dept: PRIMARY CARE CLINIC | Age: 69
End: 2024-03-26
Payer: COMMERCIAL

## 2024-03-26 VITALS
BODY MASS INDEX: 27.11 KG/M2 | TEMPERATURE: 97.7 F | SYSTOLIC BLOOD PRESSURE: 108 MMHG | DIASTOLIC BLOOD PRESSURE: 58 MMHG | HEIGHT: 63 IN | WEIGHT: 153 LBS

## 2024-03-26 DIAGNOSIS — S72.422G: Primary | ICD-10-CM

## 2024-03-26 DIAGNOSIS — E78.5 SERUM LIPIDS HIGH: ICD-10-CM

## 2024-03-26 DIAGNOSIS — S72.432G: Primary | ICD-10-CM

## 2024-03-26 DIAGNOSIS — S72.431A CLOSED BICONDYLAR FRACTURE OF DISTAL FEMUR, RIGHT, INITIAL ENCOUNTER (HCC): ICD-10-CM

## 2024-03-26 DIAGNOSIS — S72.421D CLOSED BICONDYLAR FRACTURE OF DISTAL END OF FEMUR WITH ROUTINE HEALING, RIGHT: ICD-10-CM

## 2024-03-26 DIAGNOSIS — E11.9 TYPE 2 DIABETES MELLITUS WITHOUT COMPLICATION, WITHOUT LONG-TERM CURRENT USE OF INSULIN (HCC): ICD-10-CM

## 2024-03-26 DIAGNOSIS — S72.421A CLOSED BICONDYLAR FRACTURE OF DISTAL FEMUR, RIGHT, INITIAL ENCOUNTER (HCC): ICD-10-CM

## 2024-03-26 DIAGNOSIS — I10 ESSENTIAL HYPERTENSION: Primary | ICD-10-CM

## 2024-03-26 DIAGNOSIS — S72.431D CLOSED BICONDYLAR FRACTURE OF DISTAL END OF FEMUR WITH ROUTINE HEALING, RIGHT: ICD-10-CM

## 2024-03-26 PROCEDURE — G8400 PT W/DXA NO RESULTS DOC: HCPCS | Performed by: INTERNAL MEDICINE

## 2024-03-26 PROCEDURE — 3078F DIAST BP <80 MM HG: CPT | Performed by: INTERNAL MEDICINE

## 2024-03-26 PROCEDURE — G8427 DOCREV CUR MEDS BY ELIG CLIN: HCPCS | Performed by: ORTHOPAEDIC SURGERY

## 2024-03-26 PROCEDURE — 3074F SYST BP LT 130 MM HG: CPT | Performed by: INTERNAL MEDICINE

## 2024-03-26 PROCEDURE — G8484 FLU IMMUNIZE NO ADMIN: HCPCS | Performed by: INTERNAL MEDICINE

## 2024-03-26 PROCEDURE — 1123F ACP DISCUSS/DSCN MKR DOCD: CPT | Performed by: INTERNAL MEDICINE

## 2024-03-26 PROCEDURE — 1090F PRES/ABSN URINE INCON ASSESS: CPT | Performed by: ORTHOPAEDIC SURGERY

## 2024-03-26 PROCEDURE — G8484 FLU IMMUNIZE NO ADMIN: HCPCS | Performed by: ORTHOPAEDIC SURGERY

## 2024-03-26 PROCEDURE — G8427 DOCREV CUR MEDS BY ELIG CLIN: HCPCS | Performed by: INTERNAL MEDICINE

## 2024-03-26 PROCEDURE — G8400 PT W/DXA NO RESULTS DOC: HCPCS | Performed by: ORTHOPAEDIC SURGERY

## 2024-03-26 PROCEDURE — 99213 OFFICE O/P EST LOW 20 MIN: CPT | Performed by: ORTHOPAEDIC SURGERY

## 2024-03-26 PROCEDURE — 1123F ACP DISCUSS/DSCN MKR DOCD: CPT | Performed by: ORTHOPAEDIC SURGERY

## 2024-03-26 PROCEDURE — G8419 CALC BMI OUT NRM PARAM NOF/U: HCPCS | Performed by: ORTHOPAEDIC SURGERY

## 2024-03-26 PROCEDURE — 99214 OFFICE O/P EST MOD 30 MIN: CPT | Performed by: INTERNAL MEDICINE

## 2024-03-26 PROCEDURE — 3017F COLORECTAL CA SCREEN DOC REV: CPT | Performed by: ORTHOPAEDIC SURGERY

## 2024-03-26 PROCEDURE — 4004F PT TOBACCO SCREEN RCVD TLK: CPT | Performed by: ORTHOPAEDIC SURGERY

## 2024-03-26 PROCEDURE — G8419 CALC BMI OUT NRM PARAM NOF/U: HCPCS | Performed by: INTERNAL MEDICINE

## 2024-03-26 PROCEDURE — 2022F DILAT RTA XM EVC RTNOPTHY: CPT | Performed by: INTERNAL MEDICINE

## 2024-03-26 PROCEDURE — 3017F COLORECTAL CA SCREEN DOC REV: CPT | Performed by: INTERNAL MEDICINE

## 2024-03-26 PROCEDURE — 3044F HG A1C LEVEL LT 7.0%: CPT | Performed by: INTERNAL MEDICINE

## 2024-03-26 PROCEDURE — 4004F PT TOBACCO SCREEN RCVD TLK: CPT | Performed by: INTERNAL MEDICINE

## 2024-03-26 PROCEDURE — 1090F PRES/ABSN URINE INCON ASSESS: CPT | Performed by: INTERNAL MEDICINE

## 2024-03-26 RX ORDER — DICYCLOMINE HCL 20 MG
20 TABLET ORAL 3 TIMES DAILY
Qty: 90 TABLET | Refills: 0 | Status: SHIPPED | OUTPATIENT
Start: 2024-03-26

## 2024-03-26 RX ORDER — CYCLOBENZAPRINE HCL 10 MG
TABLET ORAL
Qty: 270 TABLET | Refills: 0 | Status: SHIPPED | OUTPATIENT
Start: 2024-03-26

## 2024-03-26 RX ORDER — LISINOPRIL 2.5 MG/1
2.5 TABLET ORAL NIGHTLY
Qty: 90 TABLET | Refills: 0 | Status: SHIPPED | OUTPATIENT
Start: 2024-03-26

## 2024-03-26 SDOH — ECONOMIC STABILITY: INCOME INSECURITY: HOW HARD IS IT FOR YOU TO PAY FOR THE VERY BASICS LIKE FOOD, HOUSING, MEDICAL CARE, AND HEATING?: NOT HARD AT ALL

## 2024-03-26 SDOH — ECONOMIC STABILITY: FOOD INSECURITY: WITHIN THE PAST 12 MONTHS, THE FOOD YOU BOUGHT JUST DIDN'T LAST AND YOU DIDN'T HAVE MONEY TO GET MORE.: NEVER TRUE

## 2024-03-26 SDOH — ECONOMIC STABILITY: FOOD INSECURITY: WITHIN THE PAST 12 MONTHS, YOU WORRIED THAT YOUR FOOD WOULD RUN OUT BEFORE YOU GOT MONEY TO BUY MORE.: NEVER TRUE

## 2024-03-26 ASSESSMENT — ENCOUNTER SYMPTOMS
NAUSEA: 0
SORE THROAT: 0
RHINORRHEA: 0
SINUS PRESSURE: 0
CONSTIPATION: 0
BACK PAIN: 0
TROUBLE SWALLOWING: 0
COLOR CHANGE: 0
VOMITING: 0
ABDOMINAL PAIN: 0
DIARRHEA: 0
BLOOD IN STOOL: 0
ABDOMINAL DISTENTION: 0
ALLERGIC/IMMUNOLOGIC NEGATIVE: 1

## 2024-03-26 NOTE — PROGRESS NOTES
Beata Butts presents today for follow up of Diabetes, HTN, High chol, recent rt femur/knee fracture.     Current Outpatient Medications   Medication Sig Dispense Refill    cyclobenzaprine (FLEXERIL) 10 MG tablet TAKE 1 TABLET BY MOUTH IN THE MORNING,& NOON& AT BEDTIME 270 tablet 0    dicyclomine (BENTYL) 20 MG tablet Take 1 tablet by mouth 3 times daily 90 tablet 0    lisinopril (PRINIVIL;ZESTRIL) 2.5 MG tablet Take 1 tablet by mouth at bedtime 90 tablet 0    metoprolol tartrate (LOPRESSOR) 25 MG tablet TAKE 1 TABLET(25 MG) BY MOUTH THREE TIMES DAILY WITH FOOD 270 tablet 0    topiramate (TOPAMAX) 50 MG tablet TAKE 1 TABLET BY MOUTH DAILY 90 tablet 0    spironolactone-hydroCHLOROthiazide (ALDACTAZIDE) 25-25 MG per tablet TAKE 1 TABLET BY MOUTH DAILY 90 tablet 0    metFORMIN (GLUCOPHAGE) 1000 MG tablet TAKE 1 TABLET(1000 MG) BY MOUTH TWICE DAILY WITH MEALS 180 tablet 0    gabapentin (NEURONTIN) 400 MG capsule Take 1 capsule by mouth 3 times daily for 90 days. 270 capsule 0    rosuvastatin (CRESTOR) 20 MG tablet TAKE 1 TABLET BY MOUTH DAILY 90 tablet 0    glipiZIDE (GLUCOTROL) 10 MG tablet TAKE 1 TABLET(10 MG) BY MOUTH TWICE DAILY 30 MINUTES BEFORE MEALS 180 tablet 0    famotidine (PEPCID) 40 MG tablet TAKE 1 TABLET BY MOUTH EVERY DAY 90 tablet 0    buPROPion (WELLBUTRIN SR) 100 MG extended release tablet TAKE 1 TABLET(100 MG) BY MOUTH TWICE DAILY 180 tablet 0    aspirin 81 MG EC tablet Take 1 tablet by mouth in the morning and 1 tablet in the evening. 60 tablet 2    omeprazole (PRILOSEC) 20 MG delayed release capsule Take 1 capsule by mouth daily      atorvastatin (LIPITOR) 40 MG tablet Take 1 tablet by mouth nightly      pantoprazole (PROTONIX) 40 MG tablet Take 1 tablet by mouth every morning      traZODone (DESYREL) 50 MG tablet Take 1 tablet by mouth nightly as needed for Sleep      ipratropium (ATROVENT HFA) 17 MCG/ACT inhaler Inhale 2 puffs into the lungs every 4 hours as needed for Wheezing

## 2024-03-26 NOTE — PATIENT INSTRUCTIONS
Continue bone stimulator, vitamin D and calcium    Follow-up here in 6 weeks    Call sooner with any increased pain, questions, or concerns

## 2024-03-26 NOTE — PROGRESS NOTES
Use Topics    Alcohol use: No    Drug use: No       No Known Allergies      Review of Systems -   General ROS: negative for - chills, fatigue, fever or night sweats  Respiratory ROS: no cough, shortness of breath, or wheezing  Cardiovascular ROS: no chest pain or dyspnea on exertion  Gastrointestinal ROS: no abdominal pain, change in bowel habits, or black or bloody stools  Genitourinary: no hematuria, dysuria, or incontinence   Musculoskeletal ROS:see above  Neurological ROS: no TIA or stroke symptoms       OBJECTIVE:   Alert and oriented X 3, no acute distress, respirations easy and unlabored with no audible wheezes, skin warm and dry, speech and dress appropriate for noted age, affect euthymic.    Extremity:  Right lower extremity   Skin intact, surgical incisions well-healed with no signs of infection   No tenderness palpation at the fracture site   Knee range of motion is -3 to 115 degrees with no significant crepitus and no significant pain   Compartments soft and compressible   Calves soft and non tender    5/5 EHL, TA, GS   2/4 DP and PT pulses   Sensation intact distally   Capillary refill less than 3 seconds       XR: 3/26/2024  X-rays today of the knee show no further displacement, no further backing out of the screw through the locking attachment washer.  There is also no significant difference in callus formation.  He continues to appear as a nonunion    ASSESSMENT:     Diagnosis Orders   1. Closed bicondylar fracture of distal end of left femur, with delayed healing, subsequent encounter        2. Closed bicondylar fracture of distal end of femur with routine healing, right  XR FEMUR RIGHT (MIN 2 VIEWS)          PLAN:  Continue bone stimulator, vitamin D and calcium    Follow-up here in 6 weeks    Call sooner with any increased pain, questions, or concerns      ELECTRONICALLY signed by:    Lio Galan MD  3/26/2024    NOTE: This report was transcribed using voice recognition software. Every effort

## 2024-04-12 RX ORDER — LISINOPRIL 2.5 MG/1
2.5 TABLET ORAL NIGHTLY
Qty: 90 TABLET | Refills: 0 | OUTPATIENT
Start: 2024-04-12

## 2024-04-16 RX ORDER — DICYCLOMINE HCL 20 MG
20 TABLET ORAL 3 TIMES DAILY
Qty: 270 TABLET | Refills: 0 | Status: SHIPPED | OUTPATIENT
Start: 2024-04-16

## 2024-04-22 RX ORDER — PANTOPRAZOLE SODIUM 40 MG/1
TABLET, DELAYED RELEASE ORAL
Qty: 90 TABLET | Refills: 0 | Status: SHIPPED | OUTPATIENT
Start: 2024-04-22

## 2024-05-06 RX ORDER — ROSUVASTATIN CALCIUM 20 MG/1
20 TABLET, COATED ORAL DAILY
Qty: 90 TABLET | Refills: 0 | Status: SHIPPED | OUTPATIENT
Start: 2024-05-06

## 2024-05-21 ENCOUNTER — OFFICE VISIT (OUTPATIENT)
Dept: ORTHOPEDIC SURGERY | Age: 69
End: 2024-05-21
Payer: COMMERCIAL

## 2024-05-21 VITALS — BODY MASS INDEX: 27.11 KG/M2 | HEIGHT: 63 IN | WEIGHT: 153 LBS

## 2024-05-21 DIAGNOSIS — S72.432G: Primary | ICD-10-CM

## 2024-05-21 DIAGNOSIS — S72.422G: Primary | ICD-10-CM

## 2024-05-21 PROCEDURE — 3017F COLORECTAL CA SCREEN DOC REV: CPT | Performed by: PHYSICIAN ASSISTANT

## 2024-05-21 PROCEDURE — 4004F PT TOBACCO SCREEN RCVD TLK: CPT | Performed by: PHYSICIAN ASSISTANT

## 2024-05-21 PROCEDURE — G8400 PT W/DXA NO RESULTS DOC: HCPCS | Performed by: PHYSICIAN ASSISTANT

## 2024-05-21 PROCEDURE — 1090F PRES/ABSN URINE INCON ASSESS: CPT | Performed by: PHYSICIAN ASSISTANT

## 2024-05-21 PROCEDURE — G8419 CALC BMI OUT NRM PARAM NOF/U: HCPCS | Performed by: PHYSICIAN ASSISTANT

## 2024-05-21 PROCEDURE — 99213 OFFICE O/P EST LOW 20 MIN: CPT | Performed by: PHYSICIAN ASSISTANT

## 2024-05-21 PROCEDURE — 1123F ACP DISCUSS/DSCN MKR DOCD: CPT | Performed by: PHYSICIAN ASSISTANT

## 2024-05-21 PROCEDURE — G8427 DOCREV CUR MEDS BY ELIG CLIN: HCPCS | Performed by: PHYSICIAN ASSISTANT

## 2024-05-21 NOTE — PROGRESS NOTES
Chief Complaint   Patient presents with    Follow Up After Procedure     FU right distal femur open reduction internal fixation with a locking attachment washer 10/12/2023        OP:SURGEON: Dr. Lio Galan MD  DATE OF PROCEDURE: 10/12/23  PROCEDURE:Open reduction internal fixation right supracondylar femur fracture with retrograde intramedullary nail and locking attachment washer       POD: 7 months    Subjective:  Beata Butts is following up from the above surgery. She is WBAT on right lower extremity. She ambulates with no assistive device.  Pain to extremity is mild and is not taking prescribed pain medication. They denies numbness, tingling to the right lower extremity. Denies calf pain, chest pain, or shortness of breath.  Patient had a CT completed before which demonstrates an incomplete union. She does take vitamin D. She uses her bone stimulator daily. She denies any numbness or tingling to the right lower extremity.      Review of Systems -  All pertinent positives/negative in HPI     Objective:    General: Alert and oriented X 3, normocephalic atraumatic, external ears and eye normal, sclera clear, no acute distress, respirations easy and unlabored with no audible wheezes, skin warm and dry, speech and dress appropriate for noted age, affect euthymic.    Extremity:  Left Lower Extremity  Skin clean dry and intact, without signs of infection  Incision healing well. No erythema or signs of infection.   mild edema noted  Compartments supple throughout thigh and leg  Calf supple and not tender  negative Homans  Demonstrates active knee flexion and extension -3 - 120 degrees with no pain or crepitus.   States sensation intact to touch in sural, deep peroneal, superficial peroneal, saphenous, posterior tibial  nerve distributions to foot/ankle.  Palpable dorsalis pedis and posterior tibialis pulses, cap refill brisk in toes, foot warm/perfused.    Ht 1.6 m (5' 3\")   Wt 69.4 kg (153 lb)   BMI 27.10

## 2024-05-28 ENCOUNTER — OFFICE VISIT (OUTPATIENT)
Dept: PRIMARY CARE CLINIC | Age: 69
End: 2024-05-28

## 2024-05-28 VITALS
WEIGHT: 156 LBS | HEART RATE: 82 BPM | HEIGHT: 63 IN | OXYGEN SATURATION: 98 % | BODY MASS INDEX: 27.64 KG/M2 | DIASTOLIC BLOOD PRESSURE: 56 MMHG | TEMPERATURE: 97.3 F | SYSTOLIC BLOOD PRESSURE: 102 MMHG

## 2024-05-28 DIAGNOSIS — E11.42 TYPE 2 DIABETES MELLITUS WITH DIABETIC POLYNEUROPATHY, WITHOUT LONG-TERM CURRENT USE OF INSULIN (HCC): Primary | ICD-10-CM

## 2024-05-28 DIAGNOSIS — Z87.891 PERSONAL HISTORY OF TOBACCO USE: ICD-10-CM

## 2024-05-28 DIAGNOSIS — Z00.00 MEDICARE ANNUAL WELLNESS VISIT, SUBSEQUENT: ICD-10-CM

## 2024-05-28 RX ORDER — CYCLOBENZAPRINE HCL 10 MG
TABLET ORAL
Qty: 270 TABLET | Refills: 0 | Status: SHIPPED | OUTPATIENT
Start: 2024-05-28

## 2024-05-28 RX ORDER — TOPIRAMATE 50 MG/1
50 TABLET, FILM COATED ORAL 2 TIMES DAILY
Qty: 180 TABLET | Refills: 0 | Status: SHIPPED | OUTPATIENT
Start: 2024-05-28

## 2024-05-28 ASSESSMENT — PATIENT HEALTH QUESTIONNAIRE - PHQ9
8. MOVING OR SPEAKING SO SLOWLY THAT OTHER PEOPLE COULD HAVE NOTICED. OR THE OPPOSITE, BEING SO FIGETY OR RESTLESS THAT YOU HAVE BEEN MOVING AROUND A LOT MORE THAN USUAL: NOT AT ALL
SUM OF ALL RESPONSES TO PHQ QUESTIONS 1-9: 19
4. FEELING TIRED OR HAVING LITTLE ENERGY: NEARLY EVERY DAY
SUM OF ALL RESPONSES TO PHQ QUESTIONS 1-9: 19
SUM OF ALL RESPONSES TO PHQ QUESTIONS 1-9: 19
10. IF YOU CHECKED OFF ANY PROBLEMS, HOW DIFFICULT HAVE THESE PROBLEMS MADE IT FOR YOU TO DO YOUR WORK, TAKE CARE OF THINGS AT HOME, OR GET ALONG WITH OTHER PEOPLE: VERY DIFFICULT
3. TROUBLE FALLING OR STAYING ASLEEP: NEARLY EVERY DAY
7. TROUBLE CONCENTRATING ON THINGS, SUCH AS READING THE NEWSPAPER OR WATCHING TELEVISION: SEVERAL DAYS
6. FEELING BAD ABOUT YOURSELF - OR THAT YOU ARE A FAILURE OR HAVE LET YOURSELF OR YOUR FAMILY DOWN: NEARLY EVERY DAY
5. POOR APPETITE OR OVEREATING: NEARLY EVERY DAY
2. FEELING DOWN, DEPRESSED OR HOPELESS: NEARLY EVERY DAY
SUM OF ALL RESPONSES TO PHQ QUESTIONS 1-9: 19
1. LITTLE INTEREST OR PLEASURE IN DOING THINGS: NEARLY EVERY DAY
9. THOUGHTS THAT YOU WOULD BE BETTER OFF DEAD, OR OF HURTING YOURSELF: NOT AT ALL
SUM OF ALL RESPONSES TO PHQ9 QUESTIONS 1 & 2: 6

## 2024-05-28 NOTE — PROGRESS NOTES
Beata Butts presents today for follow of High chol, GERD, HTN, Diabetes    Current Outpatient Medications   Medication Sig Dispense Refill    rosuvastatin (CRESTOR) 20 MG tablet TAKE 1 TABLET BY MOUTH DAILY 90 tablet 0    pantoprazole (PROTONIX) 40 MG tablet TAKE 1 TABLET(40 MG) BY MOUTH DAILY 90 tablet 0    dicyclomine (BENTYL) 20 MG tablet TAKE 1 TABLET BY MOUTH THREE TIMES DAILY 270 tablet 0    cyclobenzaprine (FLEXERIL) 10 MG tablet TAKE 1 TABLET BY MOUTH IN THE MORNING,& NOON& AT BEDTIME 270 tablet 0    lisinopril (PRINIVIL;ZESTRIL) 2.5 MG tablet Take 1 tablet by mouth at bedtime 90 tablet 0    metoprolol tartrate (LOPRESSOR) 25 MG tablet TAKE 1 TABLET(25 MG) BY MOUTH THREE TIMES DAILY WITH FOOD 270 tablet 0    topiramate (TOPAMAX) 50 MG tablet TAKE 1 TABLET BY MOUTH DAILY (Patient taking differently: Take 1 tablet by mouth 2 times daily) 90 tablet 0    spironolactone-hydroCHLOROthiazide (ALDACTAZIDE) 25-25 MG per tablet TAKE 1 TABLET BY MOUTH DAILY 90 tablet 0    metFORMIN (GLUCOPHAGE) 1000 MG tablet TAKE 1 TABLET(1000 MG) BY MOUTH TWICE DAILY WITH MEALS 180 tablet 0    gabapentin (NEURONTIN) 400 MG capsule Take 1 capsule by mouth 3 times daily for 90 days. 270 capsule 0    Bone Growth Stimulator (E) MISC by Does not apply route For the right distal femur 1 each 0    glipiZIDE (GLUCOTROL) 10 MG tablet TAKE 1 TABLET(10 MG) BY MOUTH TWICE DAILY 30 MINUTES BEFORE MEALS 180 tablet 0    famotidine (PEPCID) 40 MG tablet TAKE 1 TABLET BY MOUTH EVERY DAY 90 tablet 0    buPROPion (WELLBUTRIN SR) 100 MG extended release tablet TAKE 1 TABLET(100 MG) BY MOUTH TWICE DAILY 180 tablet 0    omeprazole (PRILOSEC) 20 MG delayed release capsule Take 1 capsule by mouth daily      atorvastatin (LIPITOR) 40 MG tablet Take 1 tablet by mouth nightly      traZODone (DESYREL) 50 MG tablet Take 1 tablet by mouth nightly as needed for Sleep      ipratropium (ATROVENT HFA) 17 MCG/ACT inhaler Inhale 2 puffs into the lungs every 4

## 2024-05-31 NOTE — PATIENT INSTRUCTIONS
harmless and would not have caused a problem if they had not been found through screening. But because doctors can't tell which ones will turn out to be harmless, most will be treated. This means that you may get treatment--including surgery, radiation, or chemotherapy--that you don't need.  There is a risk of damage to cells or tissue from being exposed to radiation, including the small amounts used in CTs, X-rays, and other medical tests. Over time, exposure to radiation may cause cancer and other health problems. But in most cases, the risk of getting cancer from being exposed to small amounts of radiation is low. It's not a reason to avoid these tests for most people.  What are the benefits of screening?  Your scan may be normal (negative).  For some people who are at higher risk, screening lowers the chance of dying of lung cancer. How much and how long you smoked helps to determine your risk level. Screening can find some cancers early, when treatment may be more likely to work.  What happens after screening?  The results of your CT scan will be sent to your doctor. Someone from your care team will explain the results of your scan and answer any questions you may have. If you need any follow-up, he or she will help you understand what to do next.  After a lung cancer screening, you can go back to your usual activities right away.  A lung cancer screening test can't tell if you have lung cancer. If your results are positive, your doctor can't tell whether an abnormal finding is a harmless nodule, cancer, or something else without doing more tests.  What can you do to help prevent lung cancer?  Some lung cancers can't be prevented. But if you smoke, quitting smoking is the best step you can take to prevent lung cancer. If you want to quit, your doctor can recommend medicines or other ways to help.  Follow-up care is a key part of your treatment and safety. Be sure to make and go to all appointments, and call

## 2024-05-31 NOTE — PROGRESS NOTES
Medicare Annual Wellness Visit    Beata Butts is here for Medicare AWV    Assessment & Plan   Type 2 diabetes mellitus with diabetic polyneuropathy, without long-term current use of insulin ([facility])  -     Comprehensive Metabolic Panel, Fasting; Future  -     Lipid Panel; Future  -     Hemoglobin A1C; Future  Personal history of tobacco use  -     UT VISIT TO DISCUSS LUNG CA SCREEN W LDCT  -     CT Lung Screen (Initial/Annual/Baseline); Future  Medicare annual wellness visit, subsequent    Recommendations for Preventive Services Due: see orders and patient instructions/AVS.  Recommended screening schedule for the next 5-10 years is provided to the patient in written form: see Patient Instructions/AVS.     Return in 3 months (on 8/28/2024) for Medicare Annual Wellness Visit in 1 year, follow up.     Subjective       Patient's complete Health Risk Assessment and screening values have been reviewed and are found in Flowsheets. The following problems were reviewed today and where indicated follow up appointments were made and/or referrals ordered.    Positive Risk Factor Screenings with Interventions:    Fall Risk:  Do you feel unsteady or are you worried about falling? : (!) yes  2 or more falls in past year?: no  Fall with injury in past year?: no     Interventions:    Is always very careful     Depression:  PHQ-2 Score: 6  PHQ-9 Total Score: 19    Interpretation:  5-9 mild   10-14 moderate   15-19 moderately severe   20-27 severe     Interventions:  Patient comments: Aggravated by recent leg fracture, starting to feel better           Activity, Diet, and Weight:  On average, how many days per week do you engage in moderate to strenuous exercise (like a brisk walk)?: 0 days  On average, how many minutes do you engage in exercise at this level?: 0 min    Do you eat balanced/healthy meals regularly?: (!) No    Body mass index is 27.63 kg/m².      Inactivity Interventions:  Patient comments: starting to feel better

## 2024-06-03 RX ORDER — SPIRONOLACTONE AND HYDROCHLOROTHIAZIDE 25; 25 MG/1; MG/1
1 TABLET ORAL DAILY
Qty: 90 TABLET | Refills: 0 | Status: SHIPPED | OUTPATIENT
Start: 2024-06-03

## 2024-06-03 RX ORDER — FAMOTIDINE 40 MG/1
40 TABLET, FILM COATED ORAL DAILY
Qty: 90 TABLET | Refills: 0 | Status: SHIPPED | OUTPATIENT
Start: 2024-06-03

## 2024-06-03 RX ORDER — GABAPENTIN 400 MG/1
400 CAPSULE ORAL 3 TIMES DAILY
Qty: 270 CAPSULE | Refills: 0 | Status: SHIPPED | OUTPATIENT
Start: 2024-06-03 | End: 2024-09-01

## 2024-06-07 DIAGNOSIS — E11.42 TYPE 2 DIABETES MELLITUS WITH DIABETIC POLYNEUROPATHY, WITHOUT LONG-TERM CURRENT USE OF INSULIN (HCC): ICD-10-CM

## 2024-06-07 LAB
ALBUMIN: 4.4 G/DL (ref 3.5–5.2)
ALP BLD-CCNC: 97 U/L (ref 35–104)
ALT SERPL-CCNC: 17 U/L (ref 0–32)
ANION GAP SERPL CALCULATED.3IONS-SCNC: 16 MMOL/L (ref 7–16)
AST SERPL-CCNC: 13 U/L (ref 0–31)
BILIRUB SERPL-MCNC: 0.2 MG/DL (ref 0–1.2)
BUN BLDV-MCNC: 19 MG/DL (ref 6–23)
CALCIUM SERPL-MCNC: 9.3 MG/DL (ref 8.6–10.2)
CHLORIDE BLD-SCNC: 96 MMOL/L (ref 98–107)
CHOLESTEROL, TOTAL: 137 MG/DL
CO2: 20 MMOL/L (ref 22–29)
CREAT SERPL-MCNC: 0.7 MG/DL (ref 0.5–1)
GFR, ESTIMATED: >90 ML/MIN/1.73M2
GLUCOSE FASTING: 214 MG/DL (ref 74–99)
HBA1C MFR BLD: 7.3 % (ref 4–5.6)
HDLC SERPL-MCNC: 44 MG/DL
LDL CHOLESTEROL: 48 MG/DL
POTASSIUM SERPL-SCNC: 4.6 MMOL/L (ref 3.5–5)
SODIUM BLD-SCNC: 132 MMOL/L (ref 132–146)
TOTAL PROTEIN: 7.4 G/DL (ref 6.4–8.3)
TRIGL SERPL-MCNC: 226 MG/DL
VLDLC SERPL CALC-MCNC: 45 MG/DL

## 2024-06-27 NOTE — TELEPHONE ENCOUNTER
Patient requesting a refill of her    metoprolol tartrate (LOPRESSOR) 25 MG tablet please send to   [id number] - [place], [place] - [address]    Thank you.

## 2024-07-24 RX ORDER — DICYCLOMINE HCL 20 MG
20 TABLET ORAL 3 TIMES DAILY
Qty: 270 TABLET | Refills: 0 | Status: SHIPPED | OUTPATIENT
Start: 2024-07-24

## 2024-07-24 RX ORDER — PANTOPRAZOLE SODIUM 40 MG/1
TABLET, DELAYED RELEASE ORAL
Qty: 90 TABLET | Refills: 0 | Status: SHIPPED | OUTPATIENT
Start: 2024-07-24

## 2024-07-29 RX ORDER — ROSUVASTATIN CALCIUM 20 MG/1
20 TABLET, COATED ORAL DAILY
Qty: 90 TABLET | Refills: 0 | Status: SHIPPED | OUTPATIENT
Start: 2024-07-29

## 2024-07-29 RX ORDER — GLIPIZIDE 10 MG/1
TABLET ORAL
Qty: 180 TABLET | Refills: 0 | Status: SHIPPED | OUTPATIENT
Start: 2024-07-29

## 2024-08-13 ENCOUNTER — OFFICE VISIT (OUTPATIENT)
Dept: PRIMARY CARE CLINIC | Age: 69
End: 2024-08-13
Payer: COMMERCIAL

## 2024-08-13 VITALS
DIASTOLIC BLOOD PRESSURE: 60 MMHG | WEIGHT: 157 LBS | HEIGHT: 63 IN | BODY MASS INDEX: 27.82 KG/M2 | OXYGEN SATURATION: 97 % | HEART RATE: 84 BPM | SYSTOLIC BLOOD PRESSURE: 110 MMHG

## 2024-08-13 DIAGNOSIS — E11.9 TYPE 2 DIABETES MELLITUS WITHOUT COMPLICATION, WITHOUT LONG-TERM CURRENT USE OF INSULIN (HCC): Primary | ICD-10-CM

## 2024-08-13 PROCEDURE — 99214 OFFICE O/P EST MOD 30 MIN: CPT | Performed by: INTERNAL MEDICINE

## 2024-08-13 PROCEDURE — 1123F ACP DISCUSS/DSCN MKR DOCD: CPT | Performed by: INTERNAL MEDICINE

## 2024-08-13 PROCEDURE — G8400 PT W/DXA NO RESULTS DOC: HCPCS | Performed by: INTERNAL MEDICINE

## 2024-08-13 PROCEDURE — G8427 DOCREV CUR MEDS BY ELIG CLIN: HCPCS | Performed by: INTERNAL MEDICINE

## 2024-08-13 PROCEDURE — 4004F PT TOBACCO SCREEN RCVD TLK: CPT | Performed by: INTERNAL MEDICINE

## 2024-08-13 PROCEDURE — 3074F SYST BP LT 130 MM HG: CPT | Performed by: INTERNAL MEDICINE

## 2024-08-13 PROCEDURE — 82044 UR ALBUMIN SEMIQUANTITATIVE: CPT | Performed by: INTERNAL MEDICINE

## 2024-08-13 PROCEDURE — 3051F HG A1C>EQUAL 7.0%<8.0%: CPT | Performed by: INTERNAL MEDICINE

## 2024-08-13 PROCEDURE — 3017F COLORECTAL CA SCREEN DOC REV: CPT | Performed by: INTERNAL MEDICINE

## 2024-08-13 PROCEDURE — 1090F PRES/ABSN URINE INCON ASSESS: CPT | Performed by: INTERNAL MEDICINE

## 2024-08-13 PROCEDURE — 2022F DILAT RTA XM EVC RTNOPTHY: CPT | Performed by: INTERNAL MEDICINE

## 2024-08-13 PROCEDURE — G8419 CALC BMI OUT NRM PARAM NOF/U: HCPCS | Performed by: INTERNAL MEDICINE

## 2024-08-13 PROCEDURE — 3078F DIAST BP <80 MM HG: CPT | Performed by: INTERNAL MEDICINE

## 2024-08-13 RX ORDER — LISINOPRIL 2.5 MG/1
2.5 TABLET ORAL NIGHTLY
Qty: 90 TABLET | Refills: 0 | Status: SHIPPED | OUTPATIENT
Start: 2024-08-13

## 2024-08-13 RX ORDER — CYCLOBENZAPRINE HCL 10 MG
TABLET ORAL
Qty: 270 TABLET | Refills: 0 | Status: SHIPPED | OUTPATIENT
Start: 2024-08-13

## 2024-08-13 RX ORDER — BUPROPION HYDROCHLORIDE 100 MG/1
100 TABLET, EXTENDED RELEASE ORAL 2 TIMES DAILY
Qty: 180 TABLET | Refills: 0 | Status: SHIPPED | OUTPATIENT
Start: 2024-08-13

## 2024-08-13 RX ORDER — TOPIRAMATE 50 MG/1
50 TABLET, FILM COATED ORAL 2 TIMES DAILY
Qty: 180 TABLET | Refills: 0 | Status: SHIPPED | OUTPATIENT
Start: 2024-08-13

## 2024-08-13 NOTE — PROGRESS NOTES
Beata Butts presents today for ollow up of Diabetes, HTN, Migraine Headaches.     Current Outpatient Medications   Medication Sig Dispense Refill    rosuvastatin (CRESTOR) 20 MG tablet TAKE 1 TABLET BY MOUTH DAILY 90 tablet 0    glipiZIDE (GLUCOTROL) 10 MG tablet TAKE 1 TABLET(10 MG) BY MOUTH TWICE DAILY 30 MINUTES BEFORE MEALS 180 tablet 0    pantoprazole (PROTONIX) 40 MG tablet TAKE 1 TABLET(40 MG) BY MOUTH DAILY 90 tablet 0    dicyclomine (BENTYL) 20 MG tablet TAKE 1 TABLET BY MOUTH THREE TIMES DAILY 270 tablet 0    metoprolol tartrate (LOPRESSOR) 25 MG tablet TAKE 1 TABLET(25 MG) BY MOUTH THREE TIMES DAILY WITH FOOD 270 tablet 0    spironolactone-hydroCHLOROthiazide (ALDACTAZIDE) 25-25 MG per tablet TAKE 1 TABLET BY MOUTH DAILY 90 tablet 0    metFORMIN (GLUCOPHAGE) 1000 MG tablet TAKE 1 TABLET(1000 MG) BY MOUTH TWICE DAILY WITH MEALS 180 tablet 0    gabapentin (NEURONTIN) 400 MG capsule Take 1 capsule by mouth 3 times daily for 90 days. 270 capsule 0    famotidine (PEPCID) 40 MG tablet TAKE 1 TABLET BY MOUTH EVERY DAY 90 tablet 0    cyclobenzaprine (FLEXERIL) 10 MG tablet TAKE 1 TABLET BY MOUTH IN THE MORNING,& NOON& AT BEDTIME 270 tablet 0    topiramate (TOPAMAX) 50 MG tablet Take 1 tablet by mouth 2 times daily 180 tablet 0    lisinopril (PRINIVIL;ZESTRIL) 2.5 MG tablet Take 1 tablet by mouth at bedtime 90 tablet 0    Bone Growth Stimulator (E) MISC by Does not apply route For the right distal femur 1 each 0    buPROPion (WELLBUTRIN SR) 100 MG extended release tablet TAKE 1 TABLET(100 MG) BY MOUTH TWICE DAILY 180 tablet 0    omeprazole (PRILOSEC) 20 MG delayed release capsule Take 1 capsule by mouth daily      atorvastatin (LIPITOR) 40 MG tablet Take 1 tablet by mouth nightly      traZODone (DESYREL) 50 MG tablet Take 1 tablet by mouth nightly as needed for Sleep      ibuprofen (ADVIL;MOTRIN) 200 MG tablet Take 1 tablet by mouth every 6 hours as needed for Pain      ammonium lactate (AMLACTIN) 12 %

## 2024-08-19 RX ORDER — GABAPENTIN 400 MG/1
400 CAPSULE ORAL 3 TIMES DAILY
Qty: 270 CAPSULE | Refills: 0 | OUTPATIENT
Start: 2024-08-19

## 2024-08-19 RX ORDER — FAMOTIDINE 40 MG/1
40 TABLET, FILM COATED ORAL DAILY
Qty: 90 TABLET | Refills: 0 | OUTPATIENT
Start: 2024-08-19

## 2024-08-19 RX ORDER — SPIRONOLACTONE AND HYDROCHLOROTHIAZIDE 25; 25 MG/1; MG/1
1 TABLET ORAL DAILY
Qty: 90 TABLET | Refills: 0 | OUTPATIENT
Start: 2024-08-19

## 2024-08-27 ENCOUNTER — OFFICE VISIT (OUTPATIENT)
Dept: ORTHOPEDIC SURGERY | Age: 69
End: 2024-08-27
Payer: COMMERCIAL

## 2024-08-27 DIAGNOSIS — S72.431D CLOSED BICONDYLAR FRACTURE OF DISTAL END OF FEMUR WITH ROUTINE HEALING, RIGHT: Primary | ICD-10-CM

## 2024-08-27 DIAGNOSIS — S72.421D CLOSED BICONDYLAR FRACTURE OF DISTAL END OF FEMUR WITH ROUTINE HEALING, RIGHT: Primary | ICD-10-CM

## 2024-08-27 PROCEDURE — 1123F ACP DISCUSS/DSCN MKR DOCD: CPT | Performed by: ORTHOPAEDIC SURGERY

## 2024-08-27 PROCEDURE — 99213 OFFICE O/P EST LOW 20 MIN: CPT | Performed by: ORTHOPAEDIC SURGERY

## 2024-08-27 PROCEDURE — G8419 CALC BMI OUT NRM PARAM NOF/U: HCPCS | Performed by: ORTHOPAEDIC SURGERY

## 2024-08-27 PROCEDURE — 4004F PT TOBACCO SCREEN RCVD TLK: CPT | Performed by: ORTHOPAEDIC SURGERY

## 2024-08-27 PROCEDURE — 1090F PRES/ABSN URINE INCON ASSESS: CPT | Performed by: ORTHOPAEDIC SURGERY

## 2024-08-27 PROCEDURE — 3017F COLORECTAL CA SCREEN DOC REV: CPT | Performed by: ORTHOPAEDIC SURGERY

## 2024-08-27 PROCEDURE — G8427 DOCREV CUR MEDS BY ELIG CLIN: HCPCS | Performed by: ORTHOPAEDIC SURGERY

## 2024-08-27 PROCEDURE — G8400 PT W/DXA NO RESULTS DOC: HCPCS | Performed by: ORTHOPAEDIC SURGERY

## 2024-08-27 NOTE — PATIENT INSTRUCTIONS
Activities to    Follow-up in 3 to 4 months, call sooner with any increased pain questions or concerns

## 2024-08-27 NOTE — PROGRESS NOTES
Chief Complaint   Patient presents with    Follow-up     Right distal femur 8/13/24. States it is getting stronger but she is still very sore       SUBJECTIVE: Patient is a very pleasant 69-year-old female comes in today for follow-up of her right supracondylar femur fracture.  She has a native knee.  Her surgery was in October 2023.  She is still having mild pain.  She continues to get along well.  She had a CT done in the spring which showed partial healing.  On her x-rays today she does have increased bone formation and states that her pain has improved.  She still has weakness but does walk without assistance.  She denies any falls or traumas          Past Medical History:   Diagnosis Date    Anemia     COPD (chronic obstructive pulmonary disease) (HCC)     Diabetes mellitus (HCC)     Fibromyalgia     GERD (gastroesophageal reflux disease)     Hx of migraine headaches     Hyperlipidemia     Hypertension     Major depressive disorder     Polycythemia vera (HCC)     Raynaud disease        Past Surgical History:   Procedure Laterality Date    APPENDECTOMY      CHOLECYSTECTOMY      COLONOSCOPY N/A 2/4/2019    COLONOSCOPY DIAGNOSTIC    ++ISOLATION++ performed by Xenia Anderson MD at [facility] ENDOSCOPY    FEMUR FRACTURE SURGERY Right 10/12/2023    RIGHT DISTAL FEMUR OPEN REDUCTION INTERNAL FIXATION performed by Lio Galan MD at [facility] OR    HYSTERECTOMY (CERVIX STATUS UNKNOWN)      UPPER GASTROINTESTINAL ENDOSCOPY N/A 2/4/2019    EGD BIOPSY performed by Xenia Anderson MD at [facility] ENDOSCOPY       Family History   Problem Relation Age of Onset    Heart Attack Mother     Stroke Mother     Cancer Father     Arthritis Sister     Lupus Sister     No Known Problems Brother     No Known Problems Brother     No Known Problems Brother        Social History     Tobacco Use    Smoking status: Every Day     Current packs/day: 0.50     Average packs/day: 0.5 packs/day for 50.7 years (25.3 ttl pk-yrs)     Types: Cigarettes  present

## 2024-09-16 RX ORDER — METOPROLOL TARTRATE 25 MG/1
TABLET, FILM COATED ORAL
Qty: 270 TABLET | Refills: 0 | Status: SHIPPED | OUTPATIENT
Start: 2024-09-16

## 2024-10-10 NOTE — FLOWSHEET NOTE
Message Dr. Andres Reyes with vitals and to see if both Bp meds should be given. Ok to give.
Ortho messaged that patient is having pain control issues.
no joint swelling/no joint erythema/no joint warmth/no calf tenderness
no joint swelling/no joint erythema/no joint warmth/no calf tenderness

## 2024-10-14 RX ORDER — PANTOPRAZOLE SODIUM 40 MG/1
TABLET, DELAYED RELEASE ORAL
Qty: 90 TABLET | Refills: 0 | Status: SHIPPED | OUTPATIENT
Start: 2024-10-14

## 2024-10-14 RX ORDER — DICYCLOMINE HCL 20 MG
20 TABLET ORAL 3 TIMES DAILY
Qty: 270 TABLET | Refills: 0 | Status: SHIPPED | OUTPATIENT
Start: 2024-10-14

## 2024-10-21 RX ORDER — GLIPIZIDE 10 MG/1
TABLET ORAL
Qty: 180 TABLET | Refills: 0 | Status: SHIPPED | OUTPATIENT
Start: 2024-10-21

## 2024-10-21 RX ORDER — ROSUVASTATIN CALCIUM 20 MG/1
20 TABLET, COATED ORAL DAILY
Qty: 90 TABLET | Refills: 0 | Status: SHIPPED | OUTPATIENT
Start: 2024-10-21

## 2024-10-28 NOTE — TELEPHONE ENCOUNTER
Name of Medication(s) Requested:  Requested Prescriptions     Pending Prescriptions Disp Refills    gabapentin (NEURONTIN) 400 MG capsule 270 capsule 0     Sig: Take 1 capsule by mouth 3 times daily for 90 days.       Medication is on current medication list Yes    Dosage and directions were verified? Yes    Quantity verified: 90 day supply     Pharmacy Verified?  Yes    Last Appointment:  Visit date not found    Future appts:  Future Appointments   Date Time Provider Department Center   11/13/2024  2:00 PM [facility]   12/3/2024  1:15 PM Lio Galan MD  BD ORTHO [facility]        (If no appt send self scheduling link. .REFILLAPPT)  Scheduling request sent?     [] Yes  [x] No    Does patient need updated?  [] Yes  [x] No

## 2024-10-29 RX ORDER — GABAPENTIN 400 MG/1
400 CAPSULE ORAL 3 TIMES DAILY
Qty: 270 CAPSULE | Refills: 0 | Status: SHIPPED | OUTPATIENT
Start: 2024-10-29 | End: 2025-01-27

## 2024-11-12 RX ORDER — LISINOPRIL 2.5 MG/1
2.5 TABLET ORAL NIGHTLY
Qty: 90 TABLET | Refills: 0 | Status: SHIPPED | OUTPATIENT
Start: 2024-11-12

## 2024-11-12 RX ORDER — BUPROPION HYDROCHLORIDE 100 MG/1
100 TABLET, EXTENDED RELEASE ORAL 2 TIMES DAILY
Qty: 180 TABLET | Refills: 0 | OUTPATIENT
Start: 2024-11-12

## 2024-11-12 RX ORDER — CYCLOBENZAPRINE HCL 10 MG
TABLET ORAL
Qty: 270 TABLET | Refills: 0 | Status: SHIPPED | OUTPATIENT
Start: 2024-11-12

## 2024-11-12 RX ORDER — TOPIRAMATE 50 MG/1
50 TABLET, FILM COATED ORAL 2 TIMES DAILY
Qty: 180 TABLET | Refills: 0 | Status: SHIPPED | OUTPATIENT
Start: 2024-11-12

## 2024-11-12 NOTE — TELEPHONE ENCOUNTER
Name of Medication(s) Requested:  Requested Prescriptions     Pending Prescriptions Disp Refills    cyclobenzaprine (FLEXERIL) 10 MG tablet [Pharmacy Med Name: CYCLOBENZAPRINE 10MG TABLETS] 270 tablet 0     Sig: TAKE 1 TABLET BY MOUTH IN THE MORNING, 1 TABLET AT NOON, AND 1 TABLET AT BEDTIME.    lisinopril (PRINIVIL;ZESTRIL) 2.5 MG tablet [Pharmacy Med Name: LISINOPRIL 2.5MG TABLETS] 90 tablet 0     Sig: TAKE 1 TABLET BY MOUTH AT BEDTIME    topiramate (TOPAMAX) 50 MG tablet [Pharmacy Med Name: TOPIRAMATE 50MG TABLETS] 180 tablet 0     Sig: TAKE 1 TABLET BY MOUTH TWICE DAILY       Medication is on current medication list Yes    Dosage and directions were verified? Yes    Quantity verified: 90 day supply     Pharmacy Verified?  Yes    Last Appointment:  8/13/2024    Future appts:  Future Appointments   Date Time Provider Department Center   11/13/2024  2:00 [facility]   12/3/2024  1:15 PM Lio Galan MD [facility]O [facility]        (If no appt send self scheduling link. .REFILLAPPT)  Scheduling request sent?     [] Yes  [x] No    Does patient need updated?  [] Yes  [x] No

## 2024-11-13 ENCOUNTER — OFFICE VISIT (OUTPATIENT)
Dept: PRIMARY CARE CLINIC | Age: 69
End: 2024-11-13
Payer: COMMERCIAL

## 2024-11-13 VITALS
HEIGHT: 63 IN | BODY MASS INDEX: 27.82 KG/M2 | OXYGEN SATURATION: 97 % | HEART RATE: 78 BPM | SYSTOLIC BLOOD PRESSURE: 110 MMHG | WEIGHT: 157 LBS | DIASTOLIC BLOOD PRESSURE: 70 MMHG | TEMPERATURE: 97.8 F

## 2024-11-13 DIAGNOSIS — E11.9 TYPE 2 DIABETES MELLITUS WITHOUT COMPLICATION, WITHOUT LONG-TERM CURRENT USE OF INSULIN (HCC): Primary | ICD-10-CM

## 2024-11-13 DIAGNOSIS — K21.9 GASTROESOPHAGEAL REFLUX DISEASE WITHOUT ESOPHAGITIS: ICD-10-CM

## 2024-11-13 DIAGNOSIS — I10 ESSENTIAL HYPERTENSION: ICD-10-CM

## 2024-11-13 PROCEDURE — 3051F HG A1C>EQUAL 7.0%<8.0%: CPT | Performed by: INTERNAL MEDICINE

## 2024-11-13 PROCEDURE — G8419 CALC BMI OUT NRM PARAM NOF/U: HCPCS | Performed by: INTERNAL MEDICINE

## 2024-11-13 PROCEDURE — 1090F PRES/ABSN URINE INCON ASSESS: CPT | Performed by: INTERNAL MEDICINE

## 2024-11-13 PROCEDURE — 4004F PT TOBACCO SCREEN RCVD TLK: CPT | Performed by: INTERNAL MEDICINE

## 2024-11-13 PROCEDURE — 2022F DILAT RTA XM EVC RTNOPTHY: CPT | Performed by: INTERNAL MEDICINE

## 2024-11-13 PROCEDURE — 3017F COLORECTAL CA SCREEN DOC REV: CPT | Performed by: INTERNAL MEDICINE

## 2024-11-13 PROCEDURE — G8427 DOCREV CUR MEDS BY ELIG CLIN: HCPCS | Performed by: INTERNAL MEDICINE

## 2024-11-13 PROCEDURE — 3078F DIAST BP <80 MM HG: CPT | Performed by: INTERNAL MEDICINE

## 2024-11-13 PROCEDURE — 1123F ACP DISCUSS/DSCN MKR DOCD: CPT | Performed by: INTERNAL MEDICINE

## 2024-11-13 PROCEDURE — 99214 OFFICE O/P EST MOD 30 MIN: CPT | Performed by: INTERNAL MEDICINE

## 2024-11-13 PROCEDURE — G8484 FLU IMMUNIZE NO ADMIN: HCPCS | Performed by: INTERNAL MEDICINE

## 2024-11-13 PROCEDURE — 3074F SYST BP LT 130 MM HG: CPT | Performed by: INTERNAL MEDICINE

## 2024-11-13 PROCEDURE — 82044 UR ALBUMIN SEMIQUANTITATIVE: CPT | Performed by: INTERNAL MEDICINE

## 2024-11-13 PROCEDURE — G8400 PT W/DXA NO RESULTS DOC: HCPCS | Performed by: INTERNAL MEDICINE

## 2024-11-13 RX ORDER — FAMOTIDINE 40 MG/1
40 TABLET, FILM COATED ORAL DAILY
Qty: 90 TABLET | Refills: 0 | Status: SHIPPED | OUTPATIENT
Start: 2024-11-13

## 2024-11-13 RX ORDER — BUPROPION HYDROCHLORIDE 100 MG/1
100 TABLET, EXTENDED RELEASE ORAL 2 TIMES DAILY
Qty: 180 TABLET | Refills: 0 | Status: SHIPPED | OUTPATIENT
Start: 2024-11-13

## 2024-11-13 RX ORDER — SPIRONOLACTONE AND HYDROCHLOROTHIAZIDE 25; 25 MG/1; MG/1
1 TABLET ORAL DAILY
Qty: 90 TABLET | Refills: 0 | Status: SHIPPED | OUTPATIENT
Start: 2024-11-13

## 2024-11-13 ASSESSMENT — ENCOUNTER SYMPTOMS
VOMITING: 0
TROUBLE SWALLOWING: 0
SORE THROAT: 0
BLOOD IN STOOL: 0
ABDOMINAL DISTENTION: 0
DIARRHEA: 0
BACK PAIN: 0
NAUSEA: 0
ABDOMINAL PAIN: 0
CONSTIPATION: 0
ALLERGIC/IMMUNOLOGIC NEGATIVE: 1
SINUS PRESSURE: 0
RHINORRHEA: 0
COLOR CHANGE: 0

## 2024-11-13 NOTE — PROGRESS NOTES
cont    Gastroesophageal reflux disease without esophagitis  Comments:  Cotrolled on Prilosec, cont and dietary restrictions    Other orders  -     buPROPion (WELLBUTRIN SR) 100 MG extended release tablet; Take 1 tablet by mouth 2 times daily  -     famotidine (PEPCID) 40 MG tablet; Take 1 tablet by mouth daily  -     metFORMIN (GLUCOPHAGE) 1000 MG tablet; Take 1 tablet by mouth 2 times daily (with meals)  -     spironolactone-hydroCHLOROthiazide (ALDACTAZIDE) 25-25 MG per tablet; Take 1 tablet by mouth daily

## 2024-12-03 ENCOUNTER — OFFICE VISIT (OUTPATIENT)
Dept: ORTHOPEDIC SURGERY | Age: 69
End: 2024-12-03
Payer: COMMERCIAL

## 2024-12-03 VITALS
TEMPERATURE: 97.8 F | DIASTOLIC BLOOD PRESSURE: 82 MMHG | SYSTOLIC BLOOD PRESSURE: 136 MMHG | HEART RATE: 91 BPM | OXYGEN SATURATION: 95 %

## 2024-12-03 DIAGNOSIS — S72.431D CLOSED BICONDYLAR FRACTURE OF DISTAL END OF FEMUR WITH ROUTINE HEALING, RIGHT: Primary | ICD-10-CM

## 2024-12-03 DIAGNOSIS — S72.421D CLOSED BICONDYLAR FRACTURE OF DISTAL END OF FEMUR WITH ROUTINE HEALING, RIGHT: Primary | ICD-10-CM

## 2024-12-03 PROCEDURE — G8400 PT W/DXA NO RESULTS DOC: HCPCS | Performed by: PHYSICIAN ASSISTANT

## 2024-12-03 PROCEDURE — 3017F COLORECTAL CA SCREEN DOC REV: CPT | Performed by: PHYSICIAN ASSISTANT

## 2024-12-03 PROCEDURE — G8484 FLU IMMUNIZE NO ADMIN: HCPCS | Performed by: PHYSICIAN ASSISTANT

## 2024-12-03 PROCEDURE — G8427 DOCREV CUR MEDS BY ELIG CLIN: HCPCS | Performed by: PHYSICIAN ASSISTANT

## 2024-12-03 PROCEDURE — G8419 CALC BMI OUT NRM PARAM NOF/U: HCPCS | Performed by: PHYSICIAN ASSISTANT

## 2024-12-03 PROCEDURE — 4004F PT TOBACCO SCREEN RCVD TLK: CPT | Performed by: PHYSICIAN ASSISTANT

## 2024-12-03 PROCEDURE — 3075F SYST BP GE 130 - 139MM HG: CPT | Performed by: PHYSICIAN ASSISTANT

## 2024-12-03 PROCEDURE — 1090F PRES/ABSN URINE INCON ASSESS: CPT | Performed by: PHYSICIAN ASSISTANT

## 2024-12-03 PROCEDURE — 99213 OFFICE O/P EST LOW 20 MIN: CPT | Performed by: PHYSICIAN ASSISTANT

## 2024-12-03 PROCEDURE — 3079F DIAST BP 80-89 MM HG: CPT | Performed by: PHYSICIAN ASSISTANT

## 2024-12-03 PROCEDURE — 1123F ACP DISCUSS/DSCN MKR DOCD: CPT | Performed by: PHYSICIAN ASSISTANT

## 2024-12-03 RX ORDER — ERGOCALCIFEROL 1.25 MG/1
50000 CAPSULE, LIQUID FILLED ORAL WEEKLY
Qty: 12 CAPSULE | Refills: 1 | Status: SHIPPED | OUTPATIENT
Start: 2024-12-03

## 2024-12-03 NOTE — PROGRESS NOTES
Chief Complaint   Patient presents with    Follow-up     Right distal femur 8/13/24. Still 7/10 pain        OP:SURGEON: Dr. Lio Galan MD  DATE OF PROCEDURE: 10/12/23  PROCEDURE:Open reduction internal fixation right supracondylar femur fracture with retrograde intramedullary nail and locking attachment washer        Subjective:  Beata Butts is following up from the above surgery. She is WBAT on right lower extremity. She ambulates with no assistive device.  Pain to extremity is mild and is not taking prescribed pain medication. They denies numbness, tingling to the right lower extremity. Denies calf pain, chest pain, or shortness of breath.  Patient has completed therapy. She uses daily vitamin D and calcium. She is on a high protein diet. She uses her bone stimulator almost every night.      Review of Systems -  All pertinent positives/negative in HPI     Objective:    General: Alert and oriented X 3, normocephalic atraumatic, external ears and eye normal, sclera clear, no acute distress, respirations easy and unlabored with no audible wheezes, skin warm and dry, speech and dress appropriate for noted age, affect euthymic.    Extremity:  Right Lower Extremity  Skin clean dry and intact, without signs of infection   Incision well healed   mild edema noted  Compartments supple throughout thigh and leg  Calf supple and not tender  negative Homans  Demonstrates active knee flexion and extension 0-90 degrees with no pain. Active ankle DF/PF  Mild tenderness over the distal femur.   States sensation intact to touch in sural, deep peroneal, superficial peroneal, saphenous, posterior tibial  nerve distributions to foot/ankle.  Palpable dorsalis pedis and posterior tibialis pulses, cap refill brisk in toes, foot warm/perfused.      /82   Pulse 91   Temp 97.8 °F (36.6 °C)   SpO2 95%     XR:   Xr of the right femur obtained today in the office and reviewed with the patient demonstrating stable right

## 2024-12-03 NOTE — PATIENT INSTRUCTIONS
Vitamin D once weekly.    Continue with Calcium.    Weightbearing as tolerated to the right lower extremity.    Continue bone stimulator.    Follow up in 2-3 months for repeat xrays of the right femur. Call sooner if needed.

## 2024-12-16 RX ORDER — METOPROLOL TARTRATE 25 MG/1
TABLET, FILM COATED ORAL
Qty: 270 TABLET | Refills: 0 | Status: SHIPPED | OUTPATIENT
Start: 2024-12-16

## 2024-12-16 NOTE — TELEPHONE ENCOUNTER
Name of Medication(s) Requested:  Requested Prescriptions     Pending Prescriptions Disp Refills    metoprolol tartrate (LOPRESSOR) 25 MG tablet [Pharmacy Med Name: METOPROLOL TARTRATE 25MG TABLETS] 270 tablet 0     Sig: TAKE 1 TABLET(25 MG) BY MOUTH THREE TIMES DAILY WITH FOOD       Medication is on current medication list Yes    Dosage and directions were verified? Yes    Quantity verified: 90 day supply     Pharmacy Verified?  Yes    Last Appointment:  11/13/2024    Future appts:  Future Appointments   Date Time Provider Department Center   2/12/2025  1:45 PM [facility]   3/11/2025  1:15 PM Lio Galan MD [facility] ORTHO [facility]   11/14/2025  2:15 PM Leeann Rankin MD [facility] DEP        (If no appt send self scheduling link. .REFILLAPPT)  Scheduling request sent?     [] Yes  [x] No    Does patient need updated?  [] Yes  [x] No

## 2025-01-13 RX ORDER — PANTOPRAZOLE SODIUM 40 MG/1
TABLET, DELAYED RELEASE ORAL
Qty: 90 TABLET | Refills: 0 | Status: SHIPPED | OUTPATIENT
Start: 2025-01-13

## 2025-01-13 RX ORDER — DICYCLOMINE HCL 20 MG
20 TABLET ORAL 3 TIMES DAILY
Qty: 270 TABLET | Refills: 0 | Status: SHIPPED | OUTPATIENT
Start: 2025-01-13

## 2025-01-13 NOTE — TELEPHONE ENCOUNTER
Name of Medication(s) Requested:  Requested Prescriptions     Pending Prescriptions Disp Refills    pantoprazole (PROTONIX) 40 MG tablet [Pharmacy Med Name: PANTOPRAZOLE 40MG TABLETS] 90 tablet 0     Sig: TAKE 1 TABLET(40 MG) BY MOUTH DAILY    dicyclomine (BENTYL) 20 MG tablet [Pharmacy Med Name: DICYCLOMINE 20MG TABLETS] 270 tablet 0     Sig: TAKE 1 TABLET BY MOUTH THREE TIMES DAILY       Medication is on current medication list Yes    Dosage and directions were verified? Yes    Quantity verified: 90 day supply     Pharmacy Verified?  Yes    Last Appointment:  11/13/2024    Future appts:  Future Appointments   Date Time Provider Department Center   2/12/2025  1:45 PM Leeann Rankin MD [facility] DEP   3/11/2025  1:15 PM Lio Galan MD [facility] ORTHO [facility]   11/14/2025  2:15 PM Leeann Rankin MD [facility] DEP        (If no appt send self scheduling link. .REFILLAPPT)  Scheduling request sent?     [] Yes  [x] No    Does patient need updated?  [] Yes  [x] No

## 2025-01-21 RX ORDER — ROSUVASTATIN CALCIUM 20 MG/1
20 TABLET, COATED ORAL DAILY
Qty: 90 TABLET | Refills: 0 | Status: SHIPPED | OUTPATIENT
Start: 2025-01-21

## 2025-01-21 RX ORDER — GLIPIZIDE 10 MG/1
TABLET ORAL
Qty: 180 TABLET | Refills: 0 | Status: SHIPPED | OUTPATIENT
Start: 2025-01-21

## 2025-01-21 NOTE — TELEPHONE ENCOUNTER
Name of Medication(s) Requested:  Requested Prescriptions     Pending Prescriptions Disp Refills    rosuvastatin (CRESTOR) 20 MG tablet [Pharmacy Med Name: ROSUVASTATIN 20MG TABLETS] 90 tablet 0     Sig: TAKE 1 TABLET BY MOUTH DAILY       Medication is on current medication list Yes    Dosage and directions were verified? Yes    Quantity verified: 90 day supply     Pharmacy Verified?  Yes    Last Appointment:  11/13/2024    Future appts:  Future Appointments   Date Time Provider Department Center   2/12/2025  1:45 PM [facility]   3/11/2025  1:15 PM Lio Galan MD  [facility] ORTHO [facility]   11/14/2025  2:15 PM Leeann Rankin MD [facility] DEP        (If no appt send self scheduling link. .REFILLAPPT)  Scheduling request sent?     [] Yes  [x] No    Does patient need updated?  [] Yes  [x] No

## 2025-01-21 NOTE — TELEPHONE ENCOUNTER
Name of Medication(s) Requested:  Requested Prescriptions     Pending Prescriptions Disp Refills    glipiZIDE (GLUCOTROL) 10 MG tablet [Pharmacy Med Name: GLIPIZIDE 10MG TABLETS] 180 tablet 0     Sig: TAKE 1 TABLET(10 MG) BY MOUTH TWICE DAILY 30 MINUTES BEFORE MEALS       Medication is on current medication list Yes    Dosage and directions were verified? Yes    Quantity verified: 90 day supply     Pharmacy Verified?  Yes    Last Appointment:  11/13/2024    Future appts:  Future Appointments   Date Time Provider Department Center   2/12/2025  1:45 PM [facility]   3/11/2025  1:15 PM Lio Galan MD [facility] ORTHO [facility]   11/14/2025  2:15 PM Leeann Rankin MD [facility] DEP        (If no appt send self scheduling link. .REFILLAPPT)  Scheduling request sent?     [] Yes  [x] No    Does patient need updated?  [] Yes  [x] No

## 2025-01-29 RX ORDER — GABAPENTIN 400 MG/1
400 CAPSULE ORAL 3 TIMES DAILY
Qty: 270 CAPSULE | Refills: 0 | Status: SHIPPED | OUTPATIENT
Start: 2025-01-29 | End: 2025-04-29

## 2025-01-29 NOTE — TELEPHONE ENCOUNTER
Name of Medication(s) Requested:  Requested Prescriptions     Pending Prescriptions Disp Refills    gabapentin (NEURONTIN) 400 MG capsule [Pharmacy Med Name: GABAPENTIN 400MG CAPSULES] 270 capsule 0     Sig: Take 1 capsule by mouth 3 times daily for 90 days.       Medication is on current medication list Yes    Dosage and directions were verified? Yes    Quantity verified: 90 day supply     Pharmacy Verified?  Yes    Last Appointment:  11/13/2024    Future appts:  Future Appointments   Date Time Provider Department Center   2/12/2025  1:45 [facility]   3/11/2025  1:15 PM Lio Galan MD  [facility] ORTHO [facility]   11/14/2025  2:15 PM Leeann Rankin MD [facility] DEP        (If no appt send self scheduling link. .REFILLAPPT)  Scheduling request sent?     [] Yes  [x] No    Does patient need updated?  [] Yes  [x] No

## 2025-02-03 RX ORDER — CYCLOBENZAPRINE HCL 10 MG
TABLET ORAL
Qty: 270 TABLET | Refills: 0 | OUTPATIENT
Start: 2025-02-03

## 2025-02-06 RX ORDER — CYCLOBENZAPRINE HCL 10 MG
TABLET ORAL
Qty: 270 TABLET | Refills: 0 | OUTPATIENT
Start: 2025-02-06

## 2025-02-06 NOTE — TELEPHONE ENCOUNTER
Name of Medication(s) Requested:  Requested Prescriptions     Pending Prescriptions Disp Refills    cyclobenzaprine (FLEXERIL) 10 MG tablet 180 tablet 0     Sig: TAKE 1 TABLET BY MOUTH IN THE MORNING, 1 TABLET AT NOON, AND 1 TABLET AT BEDTIME.     Refused Prescriptions Disp Refills    cyclobenzaprine (FLEXERIL) 10 MG tablet [Pharmacy Med Name: CYCLOBENZAPRINE 10MG TABLETS] 270 tablet 0     Sig: TAKE 1 TABLET BY MOUTH THREE TIMES DAILY. EVERY MORNING, AT NOON, AND AT BEDTIME     Refused By: BETSEY GUERRA     Reason for Refusal: Patient has requested refill too soon       Medication is on current medication list Yes    Dosage and directions were verified? Yes    Quantity verified: 30 day supply     Pharmacy Verified?  Yes    Last Appointment:  11/13/2024    Future appts:  Future Appointments   Date Time Provider Department Center   2/12/2025  1:45 PM Leeann Rankin MD [facility] DEP   3/11/2025  1:15 PM Lio Galan MD [facility]   11/14/2025  2:15 PM Leeann Rankin MD [facility] DEP        (If no appt send self scheduling link. .REFILLAPPT)  Scheduling request sent?     [] Yes  [x] No    Does patient need updated?  [] Yes  [x] No

## 2025-02-07 RX ORDER — CYCLOBENZAPRINE HCL 10 MG
TABLET ORAL
Qty: 180 TABLET | Refills: 0 | OUTPATIENT
Start: 2025-02-07

## 2025-02-07 RX ORDER — CYCLOBENZAPRINE HCL 10 MG
TABLET ORAL
Qty: 270 TABLET | Refills: 0 | Status: SHIPPED | OUTPATIENT
Start: 2025-02-07

## 2025-02-07 NOTE — TELEPHONE ENCOUNTER
Patient requesting a call back at [phone] to let her know what is going on with her prescription of   cyclobenzaprine (FLEXERIL) 10 MG tablet. Thank you.

## 2025-02-10 RX ORDER — TOPIRAMATE 50 MG/1
50 TABLET, FILM COATED ORAL 2 TIMES DAILY
Qty: 180 TABLET | Refills: 0 | OUTPATIENT
Start: 2025-02-10

## 2025-02-10 RX ORDER — BUPROPION HYDROCHLORIDE 100 MG/1
100 TABLET, EXTENDED RELEASE ORAL 2 TIMES DAILY
Qty: 180 TABLET | Refills: 0 | OUTPATIENT
Start: 2025-02-10

## 2025-02-10 RX ORDER — FAMOTIDINE 40 MG/1
40 TABLET, FILM COATED ORAL DAILY
Qty: 90 TABLET | Refills: 0 | OUTPATIENT
Start: 2025-02-10

## 2025-02-10 RX ORDER — LISINOPRIL 2.5 MG/1
2.5 TABLET ORAL NIGHTLY
Qty: 90 TABLET | Refills: 0 | OUTPATIENT
Start: 2025-02-10

## 2025-02-10 RX ORDER — SPIRONOLACTONE AND HYDROCHLOROTHIAZIDE 25; 25 MG/1; MG/1
1 TABLET ORAL DAILY
Qty: 90 TABLET | Refills: 0 | OUTPATIENT
Start: 2025-02-10

## 2025-02-10 NOTE — TELEPHONE ENCOUNTER
Name of Medication(s) Requested:  Requested Prescriptions     Pending Prescriptions Disp Refills    famotidine (PEPCID) 40 MG tablet [Pharmacy Med Name: FAMOTIDINE 40MG TABLETS] 90 tablet 0     Sig: TAKE 1 TABLET BY MOUTH DAILY    metFORMIN (GLUCOPHAGE) 1000 MG tablet [Pharmacy Med Name: METFORMIN 1000MG TABLETS] 180 tablet 0     Sig: TAKE 1 TABLET BY MOUTH TWICE DAILY WITH MEALS    spironolactone-hydroCHLOROthiazide (ALDACTAZIDE) 25-25 MG per tablet [Pharmacy Med Name: SPIRONOLACTONE 25MG W/HCTZ 25MG TAB] 90 tablet 0     Sig: TAKE 1 TABLET BY MOUTH DAILY       Medication is on current medication list Yes    Dosage and directions were verified? Yes    Quantity verified: 90 day supply     Pharmacy Verified?  Yes    Last Appointment:  11/13/2024    Future appts:  Future Appointments   Date Time Provider Department Center   2/12/2025  1:45 PM [facility]   3/11/2025  1:15 PM Lio Galan MD [facility]   11/14/2025  2:15 PM Leeann Rankin MD [facility] DEP        (If no appt send self scheduling link. .REFILLAPPT)  Scheduling request sent?     [] Yes  [x] No    Does patient need updated?  [] Yes  [x] No

## 2025-02-12 ENCOUNTER — OFFICE VISIT (OUTPATIENT)
Dept: PRIMARY CARE CLINIC | Age: 70
End: 2025-02-12
Payer: MEDICARE

## 2025-02-12 VITALS
BODY MASS INDEX: 27.29 KG/M2 | HEIGHT: 63 IN | DIASTOLIC BLOOD PRESSURE: 60 MMHG | TEMPERATURE: 97.9 F | WEIGHT: 154 LBS | SYSTOLIC BLOOD PRESSURE: 102 MMHG

## 2025-02-12 DIAGNOSIS — R53.83 OTHER FATIGUE: ICD-10-CM

## 2025-02-12 DIAGNOSIS — I10 ESSENTIAL HYPERTENSION: Primary | ICD-10-CM

## 2025-02-12 DIAGNOSIS — E78.5 SERUM LIPIDS HIGH: ICD-10-CM

## 2025-02-12 DIAGNOSIS — E11.9 TYPE 2 DIABETES MELLITUS WITHOUT COMPLICATION, WITHOUT LONG-TERM CURRENT USE OF INSULIN (HCC): ICD-10-CM

## 2025-02-12 LAB
ALBUMIN: 4.3 G/DL (ref 3.5–5.2)
ALP BLD-CCNC: 103 U/L (ref 35–104)
ALT SERPL-CCNC: 19 U/L (ref 0–32)
ANION GAP SERPL CALCULATED.3IONS-SCNC: 16 MMOL/L (ref 7–16)
AST SERPL-CCNC: 14 U/L (ref 0–31)
BASOPHILS ABSOLUTE: 0.11 K/UL (ref 0–0.2)
BASOPHILS RELATIVE PERCENT: 1 % (ref 0–2)
BILIRUB SERPL-MCNC: 0.2 MG/DL (ref 0–1.2)
BUN BLDV-MCNC: 19 MG/DL (ref 6–23)
CALCIUM SERPL-MCNC: 10.1 MG/DL (ref 8.6–10.2)
CHLORIDE BLD-SCNC: 96 MMOL/L (ref 98–107)
CHOLESTEROL, TOTAL: 145 MG/DL
CO2: 24 MMOL/L (ref 22–29)
CREAT SERPL-MCNC: 0.7 MG/DL (ref 0.5–1)
EOSINOPHILS ABSOLUTE: 0.3 K/UL (ref 0.05–0.5)
EOSINOPHILS RELATIVE PERCENT: 2 % (ref 0–6)
GFR, ESTIMATED: 90 ML/MIN/1.73M2
GLUCOSE BLD-MCNC: 230 MG/DL (ref 74–99)
HBA1C MFR BLD: 7.8 %
HCT VFR BLD CALC: 43.7 % (ref 34–48)
HDLC SERPL-MCNC: 42 MG/DL
HEMOGLOBIN: 13.7 G/DL (ref 11.5–15.5)
IMMATURE GRANULOCYTES %: 0 % (ref 0–5)
IMMATURE GRANULOCYTES ABSOLUTE: 0.05 K/UL (ref 0–0.58)
LDL CHOLESTEROL: 62 MG/DL
LYMPHOCYTES ABSOLUTE: 1.95 K/UL (ref 1.5–4)
LYMPHOCYTES RELATIVE PERCENT: 15 % (ref 20–42)
MCH RBC QN AUTO: 30.1 PG (ref 26–35)
MCHC RBC AUTO-ENTMCNC: 31.4 G/DL (ref 32–34.5)
MCV RBC AUTO: 96 FL (ref 80–99.9)
MONOCYTES ABSOLUTE: 0.94 K/UL (ref 0.1–0.95)
MONOCYTES RELATIVE PERCENT: 7 % (ref 2–12)
NEUTROPHILS ABSOLUTE: 9.28 K/UL (ref 1.8–7.3)
NEUTROPHILS RELATIVE PERCENT: 74 % (ref 43–80)
PDW BLD-RTO: 13.3 % (ref 11.5–15)
PLATELET # BLD: 282 K/UL (ref 130–450)
PMV BLD AUTO: 11.6 FL (ref 7–12)
POTASSIUM SERPL-SCNC: 4.8 MMOL/L (ref 3.5–5)
RBC # BLD: 4.55 M/UL (ref 3.5–5.5)
SODIUM BLD-SCNC: 136 MMOL/L (ref 132–146)
TOTAL PROTEIN: 7.2 G/DL (ref 6.4–8.3)
TRIGL SERPL-MCNC: 207 MG/DL
VLDLC SERPL CALC-MCNC: 41 MG/DL
WBC # BLD: 12.6 K/UL (ref 4.5–11.5)

## 2025-02-12 PROCEDURE — 3078F DIAST BP <80 MM HG: CPT | Performed by: INTERNAL MEDICINE

## 2025-02-12 PROCEDURE — 99214 OFFICE O/P EST MOD 30 MIN: CPT | Performed by: INTERNAL MEDICINE

## 2025-02-12 PROCEDURE — 83036 HEMOGLOBIN GLYCOSYLATED A1C: CPT | Performed by: INTERNAL MEDICINE

## 2025-02-12 PROCEDURE — 36415 COLL VENOUS BLD VENIPUNCTURE: CPT | Performed by: INTERNAL MEDICINE

## 2025-02-12 PROCEDURE — 1159F MED LIST DOCD IN RCRD: CPT | Performed by: INTERNAL MEDICINE

## 2025-02-12 PROCEDURE — 1123F ACP DISCUSS/DSCN MKR DOCD: CPT | Performed by: INTERNAL MEDICINE

## 2025-02-12 PROCEDURE — 3074F SYST BP LT 130 MM HG: CPT | Performed by: INTERNAL MEDICINE

## 2025-02-12 RX ORDER — LISINOPRIL 2.5 MG/1
2.5 TABLET ORAL NIGHTLY
Qty: 90 TABLET | Refills: 0 | Status: SHIPPED | OUTPATIENT
Start: 2025-02-12

## 2025-02-12 RX ORDER — TOPIRAMATE 50 MG/1
50 TABLET, FILM COATED ORAL 2 TIMES DAILY
Qty: 180 TABLET | Refills: 0 | Status: SHIPPED | OUTPATIENT
Start: 2025-02-12

## 2025-02-12 RX ORDER — FAMOTIDINE 40 MG/1
40 TABLET, FILM COATED ORAL DAILY
Qty: 90 TABLET | Refills: 0 | Status: SHIPPED | OUTPATIENT
Start: 2025-02-12

## 2025-02-12 RX ORDER — BUPROPION HYDROCHLORIDE 100 MG/1
100 TABLET, EXTENDED RELEASE ORAL 2 TIMES DAILY
Qty: 180 TABLET | Refills: 0 | Status: SHIPPED | OUTPATIENT
Start: 2025-02-12

## 2025-02-12 RX ORDER — METOPROLOL TARTRATE 25 MG/1
TABLET, FILM COATED ORAL
Qty: 270 TABLET | Refills: 0 | Status: SHIPPED | OUTPATIENT
Start: 2025-02-12

## 2025-02-12 RX ORDER — SPIRONOLACTONE AND HYDROCHLOROTHIAZIDE 25; 25 MG/1; MG/1
1 TABLET ORAL DAILY
Qty: 90 TABLET | Refills: 0 | Status: SHIPPED | OUTPATIENT
Start: 2025-02-12

## 2025-02-12 SDOH — ECONOMIC STABILITY: FOOD INSECURITY: WITHIN THE PAST 12 MONTHS, YOU WORRIED THAT YOUR FOOD WOULD RUN OUT BEFORE YOU GOT MONEY TO BUY MORE.: NEVER TRUE

## 2025-02-12 SDOH — ECONOMIC STABILITY: FOOD INSECURITY: WITHIN THE PAST 12 MONTHS, THE FOOD YOU BOUGHT JUST DIDN'T LAST AND YOU DIDN'T HAVE MONEY TO GET MORE.: NEVER TRUE

## 2025-02-12 ASSESSMENT — ENCOUNTER SYMPTOMS
ALLERGIC/IMMUNOLOGIC NEGATIVE: 1
RHINORRHEA: 0
COLOR CHANGE: 0
ABDOMINAL PAIN: 0
TROUBLE SWALLOWING: 0
NAUSEA: 0
ABDOMINAL DISTENTION: 0
VOMITING: 0
SINUS PRESSURE: 0
DIARRHEA: 0
SORE THROAT: 0
CONSTIPATION: 0
BACK PAIN: 0
BLOOD IN STOOL: 0

## 2025-02-12 ASSESSMENT — PATIENT HEALTH QUESTIONNAIRE - PHQ9: DEPRESSION UNABLE TO ASSESS: PT REFUSES

## 2025-02-12 NOTE — PROGRESS NOTES
Beata Butts presents today for follow up of HTN, Migraine, Sp leg fracture, Depression, Fibromyalgia    Current Outpatient Medications   Medication Sig Dispense Refill    buPROPion (WELLBUTRIN SR) 100 MG extended release tablet Take 1 tablet by mouth 2 times daily 180 tablet 0    famotidine (PEPCID) 40 MG tablet Take 1 tablet by mouth daily 90 tablet 0    lisinopril (PRINIVIL;ZESTRIL) 2.5 MG tablet Take 1 tablet by mouth at bedtime 90 tablet 0    metFORMIN (GLUCOPHAGE) 1000 MG tablet Take 1 tablet by mouth 2 times daily (with meals) 180 tablet 0    metoprolol tartrate (LOPRESSOR) 25 MG tablet TAKE 1 TABLET(25 MG) BY MOUTH THREE TIMES DAILY WITH FOOD 270 tablet 0    spironolactone-hydroCHLOROthiazide (ALDACTAZIDE) 25-25 MG per tablet Take 1 tablet by mouth daily 90 tablet 0    topiramate (TOPAMAX) 50 MG tablet Take 1 tablet by mouth 2 times daily 180 tablet 0    cyclobenzaprine (FLEXERIL) 10 MG tablet TAKE 1 TABLET BY MOUTH IN THE MORNING, 1 TABLET AT NOON, AND 1 TABLET AT BEDTIME. 270 tablet 0    gabapentin (NEURONTIN) 400 MG capsule Take 1 capsule by mouth 3 times daily for 90 days. 270 capsule 0    rosuvastatin (CRESTOR) 20 MG tablet TAKE 1 TABLET BY MOUTH DAILY 90 tablet 0    glipiZIDE (GLUCOTROL) 10 MG tablet TAKE 1 TABLET(10 MG) BY MOUTH TWICE DAILY 30 MINUTES BEFORE MEALS 180 tablet 0    pantoprazole (PROTONIX) 40 MG tablet TAKE 1 TABLET(40 MG) BY MOUTH DAILY 90 tablet 0    dicyclomine (BENTYL) 20 MG tablet TAKE 1 TABLET BY MOUTH THREE TIMES DAILY 270 tablet 0    vitamin D (ERGOCALCIFEROL) 1.25 MG (63211 UT) CAPS capsule Take 1 capsule by mouth once a week 12 capsule 1    aspirin 81 MG EC tablet Take 1 tablet by mouth in the morning and 1 tablet in the evening. 60 tablet 2    omeprazole (PRILOSEC) 20 MG delayed release capsule Take 1 capsule by mouth daily      atorvastatin (LIPITOR) 40 MG tablet Take 1 tablet by mouth nightly      traZODone (DESYREL) 50 MG tablet Take 1 tablet by mouth nightly as

## 2025-02-13 NOTE — TELEPHONE ENCOUNTER
[facility] phoned with some concerns about life at home and wanted to have  visit. She will write order and fax it over to office for Dr merritt sign. Also was sent home from hospital without a wheelchair even though she isn't allowed weight bearing after fall so I pended that for Dalia I will fax over to [facility] at [phone] when it is signed. FYI patient fell the day she came home from hospital coming into the front door. Not apparent injuries and states she didn't hurt anything but nurse just wanted to document it.
No

## 2025-02-24 ENCOUNTER — TELEPHONE (OUTPATIENT)
Dept: PRIMARY CARE CLINIC | Age: 70
End: 2025-02-24

## 2025-02-24 NOTE — TELEPHONE ENCOUNTER
Gave patient central scheduling's phone number so she can call them when she is ready to do this.

## 2025-03-11 ENCOUNTER — OFFICE VISIT (OUTPATIENT)
Dept: ORTHOPEDIC SURGERY | Age: 70
End: 2025-03-11
Payer: MEDICARE

## 2025-03-11 VITALS
OXYGEN SATURATION: 96 % | TEMPERATURE: 97.7 F | SYSTOLIC BLOOD PRESSURE: 135 MMHG | HEART RATE: 90 BPM | DIASTOLIC BLOOD PRESSURE: 65 MMHG

## 2025-03-11 DIAGNOSIS — S72.421D CLOSED BICONDYLAR FRACTURE OF DISTAL END OF FEMUR WITH ROUTINE HEALING, RIGHT: ICD-10-CM

## 2025-03-11 DIAGNOSIS — S72.431D CLOSED BICONDYLAR FRACTURE OF DISTAL END OF FEMUR WITH ROUTINE HEALING, RIGHT: ICD-10-CM

## 2025-03-11 PROCEDURE — 1160F RVW MEDS BY RX/DR IN RCRD: CPT | Performed by: PHYSICIAN ASSISTANT

## 2025-03-11 PROCEDURE — 3075F SYST BP GE 130 - 139MM HG: CPT | Performed by: PHYSICIAN ASSISTANT

## 2025-03-11 PROCEDURE — 99213 OFFICE O/P EST LOW 20 MIN: CPT | Performed by: PHYSICIAN ASSISTANT

## 2025-03-11 PROCEDURE — 1123F ACP DISCUSS/DSCN MKR DOCD: CPT | Performed by: PHYSICIAN ASSISTANT

## 2025-03-11 PROCEDURE — 3078F DIAST BP <80 MM HG: CPT | Performed by: PHYSICIAN ASSISTANT

## 2025-03-11 PROCEDURE — 1159F MED LIST DOCD IN RCRD: CPT | Performed by: PHYSICIAN ASSISTANT

## 2025-03-11 NOTE — PROGRESS NOTES
Chief Complaint   Patient presents with    Follow-up     Right distal femur 8/13/24. Still having pain            OP:SURGEON: Dr. Lio Galan MD  DATE OF PROCEDURE: 10/12/23  PROCEDURE:Open reduction internal fixation right supracondylar femur fracture with retrograde intramedullary nail and locking attachment washer      Subjective:  Beata Butts is following up from the above surgery. She is WBAT on right lower extremity. She ambulates with no assistive device.  Pain to extremity is mild and is not taking prescribed pain medication. They denies numbness, tingling to the right lower extremity. Denies calf pain, chest pain, or shortness of breath.  Patient continues to take vitamin D and calcium. She uses her bone stimulator. She does smoke 1/2 ppd. Patient reports pain to her knee is stable and has not worsened. She denies any new injury. She reports that her sister is currently in acute rehab and the increased walking and care giving does cause an ache to her right knee at the end of the day.     Review of Systems -  All pertinent positives/negative in HPI     Objective:    General: Alert and oriented X 3, normocephalic atraumatic, external ears and eye normal, sclera clear, no acute distress, respirations easy and unlabored with no audible wheezes, skin warm and dry, speech and dress appropriate for noted age, affect euthymic.    Extremity:  Right Lower Extremity  Skin clean dry and intact, without signs of infection   Incision well healed   mild edema noted  Compartments supple throughout thigh and leg  Calf supple and not tender  negative Homans  Demonstrates active knee flexion and extension 0-95 degrees with no pain. Active ankle DF/PF  Mild tenderness over the distal femur.   States sensation intact to touch in sural, deep peroneal, superficial peroneal, saphenous, posterior tibial  nerve distributions to foot/ankle.  Palpable dorsalis pedis and posterior tibialis pulses, cap refill brisk in toes,

## 2025-04-14 RX ORDER — PANTOPRAZOLE SODIUM 40 MG/1
TABLET, DELAYED RELEASE ORAL
Qty: 90 TABLET | Refills: 0 | Status: SHIPPED | OUTPATIENT
Start: 2025-04-14

## 2025-04-14 NOTE — TELEPHONE ENCOUNTER
Name of Medication(s) Requested:  Requested Prescriptions     Pending Prescriptions Disp Refills    pantoprazole (PROTONIX) 40 MG tablet [Pharmacy Med Name: PANTOPRAZOLE 40MG TABLETS] 90 tablet 0     Sig: TAKE 1 TABLET(40 MG) BY MOUTH DAILY       Medication is on current medication list Yes    Dosage and directions were verified? Yes    Quantity verified: 90 day supply     Pharmacy Verified?  Yes    Last Appointment:  2/12/2025    Future appts:  Future Appointments   Date Time Provider Department Center   4/30/2025  1:30 PM [facility]   5/27/2025  1:45 PM Lio Galan MD  [facility]   11/14/2025  2:15 PM Leeann Rankin MD [facility] DEP        (If no appt send self scheduling link. .REFILLAPPT)  Scheduling request sent?     [] Yes  [x] No    Does patient need updated?  [] Yes  [x] No

## 2025-04-28 RX ORDER — GLIPIZIDE 10 MG/1
TABLET ORAL
Qty: 180 TABLET | Refills: 0 | Status: SHIPPED | OUTPATIENT
Start: 2025-04-28

## 2025-04-28 RX ORDER — ROSUVASTATIN CALCIUM 20 MG/1
20 TABLET, COATED ORAL DAILY
Qty: 90 TABLET | Refills: 0 | Status: SHIPPED | OUTPATIENT
Start: 2025-04-28

## 2025-04-28 NOTE — TELEPHONE ENCOUNTER
Name of Medication(s) Requested:  Requested Prescriptions     Pending Prescriptions Disp Refills    rosuvastatin (CRESTOR) 20 MG tablet [Pharmacy Med Name: ROSUVASTATIN 20MG TABLETS] 90 tablet 0     Sig: TAKE 1 TABLET BY MOUTH DAILY       Medication is on current medication list Yes    Dosage and directions were verified? Yes    Quantity verified: 90 day supply     Pharmacy Verified?  Yes    Last Appointment:  2/12/2025    Future appts:  Future Appointments   Date Time Provider Department Center   4/30/2025  1:30 PM Leeann Rankin MD [facility] DEP   5/27/2025  1:45 PM Lio Galan MD  [facility]TH[facility]   11/14/2025  2:40 PM Iwona Medina DO [facility] DEP        (If no appt send self scheduling link. .REFILLAPPT)  Scheduling request sent?     [] Yes  [x] No    Does patient need updated?  [] Yes  [x] No

## 2025-04-28 NOTE — TELEPHONE ENCOUNTER
Name of Medication(s) Requested:  Requested Prescriptions     Pending Prescriptions Disp Refills    glipiZIDE (GLUCOTROL) 10 MG tablet [Pharmacy Med Name: GLIPIZIDE 10MG TABLETS] 180 tablet 0     Sig: TAKE 1 TABLET(10 MG) BY MOUTH TWICE DAILY 30 MINUTES BEFORE MEALS       Medication is on current medication list Yes    Dosage and directions were verified? Yes    Quantity verified: 90 day supply     Pharmacy Verified?  Yes    Last Appointment:  2/12/2025    Future appts:  Future Appointments   Date Time Provider Department Center   4/30/2025  1:30 PM Leeann Rankin MD [facility] DEP   5/27/2025  1:45 PM Lio Galan MD [facility] ORTHO [facility]   11/14/2025  2:40 PM Iwona Medina DO [facility] DEP        (If no appt send self scheduling link. .REFILLAPPT)  Scheduling request sent?     [] Yes  [x] No    Does patient need updated?  [] Yes  [x] No

## 2025-04-30 ENCOUNTER — OFFICE VISIT (OUTPATIENT)
Dept: PRIMARY CARE CLINIC | Age: 70
End: 2025-04-30
Payer: MEDICARE

## 2025-04-30 VITALS
TEMPERATURE: 98 F | SYSTOLIC BLOOD PRESSURE: 122 MMHG | BODY MASS INDEX: 26.58 KG/M2 | DIASTOLIC BLOOD PRESSURE: 70 MMHG | HEIGHT: 63 IN | OXYGEN SATURATION: 97 % | WEIGHT: 150 LBS | HEART RATE: 84 BPM

## 2025-04-30 DIAGNOSIS — J40 BRONCHITIS: ICD-10-CM

## 2025-04-30 DIAGNOSIS — E78.5 HYPERLIPIDEMIA, UNSPECIFIED HYPERLIPIDEMIA TYPE: ICD-10-CM

## 2025-04-30 DIAGNOSIS — M79.7 FIBROMYALGIA: ICD-10-CM

## 2025-04-30 DIAGNOSIS — T14.8XXA MUSCLE STRAIN: Primary | ICD-10-CM

## 2025-04-30 DIAGNOSIS — I10 ESSENTIAL HYPERTENSION: ICD-10-CM

## 2025-04-30 DIAGNOSIS — Z86.69 HX OF MIGRAINE HEADACHES: ICD-10-CM

## 2025-04-30 PROBLEM — D64.9 SEVERE ANEMIA: Status: RESOLVED | Noted: 2019-01-31 | Resolved: 2025-04-30

## 2025-04-30 PROCEDURE — 1123F ACP DISCUSS/DSCN MKR DOCD: CPT | Performed by: INTERNAL MEDICINE

## 2025-04-30 PROCEDURE — 3074F SYST BP LT 130 MM HG: CPT | Performed by: INTERNAL MEDICINE

## 2025-04-30 PROCEDURE — 99214 OFFICE O/P EST MOD 30 MIN: CPT | Performed by: INTERNAL MEDICINE

## 2025-04-30 PROCEDURE — 3078F DIAST BP <80 MM HG: CPT | Performed by: INTERNAL MEDICINE

## 2025-04-30 PROCEDURE — 1159F MED LIST DOCD IN RCRD: CPT | Performed by: INTERNAL MEDICINE

## 2025-04-30 RX ORDER — TOPIRAMATE 50 MG/1
50 TABLET, FILM COATED ORAL 2 TIMES DAILY
Qty: 180 TABLET | Refills: 0 | Status: SHIPPED | OUTPATIENT
Start: 2025-04-30

## 2025-04-30 RX ORDER — METOPROLOL TARTRATE 25 MG/1
TABLET, FILM COATED ORAL
Qty: 270 TABLET | Refills: 0 | Status: SHIPPED | OUTPATIENT
Start: 2025-04-30

## 2025-04-30 RX ORDER — CYCLOBENZAPRINE HCL 10 MG
TABLET ORAL
Qty: 270 TABLET | Refills: 0 | Status: SHIPPED | OUTPATIENT
Start: 2025-04-30

## 2025-04-30 RX ORDER — AZITHROMYCIN 250 MG/1
TABLET, FILM COATED ORAL
Qty: 6 TABLET | Refills: 0 | Status: SHIPPED | OUTPATIENT
Start: 2025-04-30 | End: 2025-05-10

## 2025-04-30 RX ORDER — GABAPENTIN 400 MG/1
400 CAPSULE ORAL 3 TIMES DAILY
Qty: 270 CAPSULE | Refills: 0 | Status: SHIPPED | OUTPATIENT
Start: 2025-04-30 | End: 2025-07-29

## 2025-04-30 RX ORDER — BROMPHENIRAMINE MALEATE, PSEUDOEPHEDRINE HYDROCHLORIDE, AND DEXTROMETHORPHAN HYDROBROMIDE 2; 30; 10 MG/5ML; MG/5ML; MG/5ML
5 SYRUP ORAL 4 TIMES DAILY PRN
Qty: 118 ML | Refills: 0 | Status: SHIPPED | OUTPATIENT
Start: 2025-04-30

## 2025-04-30 RX ORDER — BUPROPION HYDROCHLORIDE 100 MG/1
100 TABLET, EXTENDED RELEASE ORAL 2 TIMES DAILY
Qty: 180 TABLET | Refills: 0 | Status: SHIPPED | OUTPATIENT
Start: 2025-04-30

## 2025-04-30 RX ORDER — SPIRONOLACTONE AND HYDROCHLOROTHIAZIDE 25; 25 MG/1; MG/1
1 TABLET ORAL DAILY
Qty: 90 TABLET | Refills: 0 | Status: SHIPPED | OUTPATIENT
Start: 2025-04-30

## 2025-04-30 RX ORDER — LISINOPRIL 2.5 MG/1
2.5 TABLET ORAL NIGHTLY
Qty: 90 TABLET | Refills: 0 | Status: SHIPPED | OUTPATIENT
Start: 2025-04-30

## 2025-04-30 ASSESSMENT — ENCOUNTER SYMPTOMS: COUGH: 1

## 2025-04-30 ASSESSMENT — PATIENT HEALTH QUESTIONNAIRE - PHQ9: DEPRESSION UNABLE TO ASSESS: PT REFUSES

## 2025-04-30 NOTE — PROGRESS NOTES
Beata Butts presents today for follow up of Diabetes, refers head and chest congestion and cough.     Current Outpatient Medications   Medication Sig Dispense Refill    buPROPion (WELLBUTRIN SR) 100 MG extended release tablet Take 1 tablet by mouth 2 times daily 180 tablet 0    cyclobenzaprine (FLEXERIL) 10 MG tablet TAKE 1 TABLET BY MOUTH IN THE MORNING, 1 TABLET AT NOON, AND 1 TABLET AT BEDTIME. 270 tablet 0    gabapentin (NEURONTIN) 400 MG capsule Take 1 capsule by mouth 3 times daily for 90 days. 270 capsule 0    lisinopril (PRINIVIL;ZESTRIL) 2.5 MG tablet Take 1 tablet by mouth at bedtime 90 tablet 0    metFORMIN (GLUCOPHAGE) 1000 MG tablet Take 1 tablet by mouth 2 times daily (with meals) 180 tablet 0    metoprolol tartrate (LOPRESSOR) 25 MG tablet TAKE 1 TABLET(25 MG) BY MOUTH THREE TIMES DAILY WITH FOOD 270 tablet 0    spironolactone-hydroCHLOROthiazide (ALDACTAZIDE) 25-25 MG per tablet Take 1 tablet by mouth daily 90 tablet 0    topiramate (TOPAMAX) 50 MG tablet Take 1 tablet by mouth 2 times daily 180 tablet 0    azithromycin (ZITHROMAX) 250 MG tablet 500mg on day 1 followed by 250mg on days 2 - 5 6 tablet 0    brompheniramine-pseudoephedrine-DM 2-30-10 MG/5ML syrup Take 5 mLs by mouth 4 times daily as needed for Cough 118 mL 0    rosuvastatin (CRESTOR) 20 MG tablet TAKE 1 TABLET BY MOUTH DAILY 90 tablet 0    glipiZIDE (GLUCOTROL) 10 MG tablet TAKE 1 TABLET(10 MG) BY MOUTH TWICE DAILY 30 MINUTES BEFORE MEALS 180 tablet 0    pantoprazole (PROTONIX) 40 MG tablet TAKE 1 TABLET(40 MG) BY MOUTH DAILY 90 tablet 0    famotidine (PEPCID) 40 MG tablet Take 1 tablet by mouth daily 90 tablet 0    dicyclomine (BENTYL) 20 MG tablet TAKE 1 TABLET BY MOUTH THREE TIMES DAILY 270 tablet 0    vitamin D (ERGOCALCIFEROL) 1.25 MG (19631 UT) CAPS capsule Take 1 capsule by mouth once a week 12 capsule 1    Bone Growth Stimulator (E) MISC by Does not apply route For the right distal femur 1 each 0    aspirin 81 MG EC

## 2025-05-12 NOTE — TELEPHONE ENCOUNTER
Name of Medication(s) Requested:  Requested Prescriptions     Pending Prescriptions Disp Refills    famotidine (PEPCID) 40 MG tablet [Pharmacy Med Name: FAMOTIDINE 40MG TABLETS] 90 tablet 1     Sig: TAKE 1 TABLET BY MOUTH DAILY       Medication is on current medication list Yes    Dosage and directions were verified? Yes    Quantity verified: 90 day supply     Pharmacy Verified?  Yes    Last Appointment:  4/30/2025    Future appts:  Future Appointments   Date Time Provider Department Center   5/27/2025  1:45 PM Lio Galan MD  [facility]   7/30/2025  1:40 PM Iwona Medina DO [facility] DEP   11/14/2025  2:40 PM Iwona Medina DO [facility] DEP        (If no appt send self scheduling link. .REFILLAPPT)  Scheduling request sent?     [] Yes  [x] No    Does patient need updated?  [] Yes  [x] No

## 2025-05-13 RX ORDER — FAMOTIDINE 40 MG/1
40 TABLET, FILM COATED ORAL DAILY
Qty: 90 TABLET | Refills: 1 | Status: SHIPPED | OUTPATIENT
Start: 2025-05-13

## 2025-05-27 ENCOUNTER — OFFICE VISIT (OUTPATIENT)
Dept: ORTHOPEDIC SURGERY | Age: 70
End: 2025-05-27
Payer: MEDICARE

## 2025-05-27 VITALS
RESPIRATION RATE: 16 BRPM | SYSTOLIC BLOOD PRESSURE: 134 MMHG | OXYGEN SATURATION: 96 % | HEART RATE: 99 BPM | DIASTOLIC BLOOD PRESSURE: 79 MMHG | TEMPERATURE: 98.1 F

## 2025-05-27 DIAGNOSIS — S72.431D CLOSED BICONDYLAR FRACTURE OF DISTAL END OF FEMUR WITH ROUTINE HEALING, RIGHT: Primary | ICD-10-CM

## 2025-05-27 DIAGNOSIS — Z87.81 S/P ORIF (OPEN REDUCTION INTERNAL FIXATION) FRACTURE: ICD-10-CM

## 2025-05-27 DIAGNOSIS — S72.421D CLOSED BICONDYLAR FRACTURE OF DISTAL END OF FEMUR WITH ROUTINE HEALING, RIGHT: Primary | ICD-10-CM

## 2025-05-27 DIAGNOSIS — Z98.890 S/P ORIF (OPEN REDUCTION INTERNAL FIXATION) FRACTURE: ICD-10-CM

## 2025-05-27 PROCEDURE — 1125F AMNT PAIN NOTED PAIN PRSNT: CPT

## 2025-05-27 PROCEDURE — 1159F MED LIST DOCD IN RCRD: CPT

## 2025-05-27 PROCEDURE — 1123F ACP DISCUSS/DSCN MKR DOCD: CPT

## 2025-05-27 PROCEDURE — 3075F SYST BP GE 130 - 139MM HG: CPT

## 2025-05-27 PROCEDURE — 3078F DIAST BP <80 MM HG: CPT

## 2025-05-27 PROCEDURE — 99213 OFFICE O/P EST LOW 20 MIN: CPT

## 2025-05-27 RX ORDER — ERGOCALCIFEROL 1.25 MG/1
50000 CAPSULE, LIQUID FILLED ORAL WEEKLY
Qty: 12 CAPSULE | Refills: 1 | Status: SHIPPED | OUTPATIENT
Start: 2025-05-27

## 2025-05-27 NOTE — PROGRESS NOTES
Chief Complaint   Patient presents with    Follow-up      f/u Rt Distal Femur         OP:SURGEON: Dr. Lio Galan MD  DATE OF PROCEDURE: 10/12/23  PROCEDURE:Open reduction internal fixation right supracondylar femur fracture with retrograde intramedullary nail and locking attachment washer      Subjective:  Beata Butts is following up from the above surgery. She is WBAT on right lower extremity. She ambulates with no assistive device.  Pain to extremity is mild and is not taking prescribed pain medication. They denies numbness, tingling to the right lower extremity. Denies calf pain, chest pain, or shortness of breath.  Patient continues to take vitamin D and calcium. She uses her bone stimulator. She does smoke 1/2 ppd. Patient reports pain to her knee is stable and has not worsened. She denies any new injury.     Review of Systems -  All pertinent positives/negative in HPI     Objective:    General: Alert and oriented X 3, normocephalic atraumatic, external ears and eye normal, sclera clear, no acute distress, respirations easy and unlabored with no audible wheezes, skin warm and dry, speech and dress appropriate for noted age, affect euthymic.    Extremity:  Right Lower Extremity  Skin clean dry and intact, without signs of infection   Incision well healed   No edema noted  Compartments supple throughout thigh and leg  Calf supple and not tender  negative Homans  Demonstrates active knee flexion and extension 0-95 degrees with no pain. Active ankle DF/PF  Mild tenderness over the distal femur.   States sensation intact to touch in sural, deep peroneal, superficial peroneal, saphenous, posterior tibial  nerve distributions to foot/ankle.  Palpable dorsalis pedis and posterior tibialis pulses, cap refill brisk in toes, foot warm/perfused.    /79   Pulse 99   Temp 98.1 °F (36.7 °C)   Resp 16   SpO2 96%     XR:   Xr of the right femur obtained today in the office and reviewed with the patient

## 2025-07-02 RX ORDER — DICYCLOMINE HCL 20 MG
20 TABLET ORAL 3 TIMES DAILY
Qty: 270 TABLET | Refills: 0 | Status: SHIPPED | OUTPATIENT
Start: 2025-07-02

## 2025-07-02 NOTE — TELEPHONE ENCOUNTER
Patient requesting a refill of her  dicyclomine (BENTYL) 20 MG tablet   States she has not had it for 3 to 4 days and she is now having extreme diarrhea  from her irritable bowel syndrome. Please send into   [id number] - [place], [place] - [address]   Thank you.

## 2025-07-30 ENCOUNTER — OFFICE VISIT (OUTPATIENT)
Dept: PRIMARY CARE CLINIC | Age: 70
End: 2025-07-30
Payer: MEDICARE

## 2025-07-30 VITALS
HEIGHT: 63 IN | RESPIRATION RATE: 16 BRPM | TEMPERATURE: 97.8 F | HEART RATE: 98 BPM | DIASTOLIC BLOOD PRESSURE: 80 MMHG | WEIGHT: 149 LBS | SYSTOLIC BLOOD PRESSURE: 112 MMHG | OXYGEN SATURATION: 98 % | BODY MASS INDEX: 26.4 KG/M2

## 2025-07-30 DIAGNOSIS — I10 ESSENTIAL HYPERTENSION: Primary | ICD-10-CM

## 2025-07-30 DIAGNOSIS — T14.8XXA MUSCLE STRAIN: ICD-10-CM

## 2025-07-30 DIAGNOSIS — J44.1 COPD EXACERBATION (HCC): ICD-10-CM

## 2025-07-30 DIAGNOSIS — E11.9 TYPE 2 DIABETES MELLITUS WITHOUT COMPLICATION, WITHOUT LONG-TERM CURRENT USE OF INSULIN (HCC): ICD-10-CM

## 2025-07-30 DIAGNOSIS — E78.5 HYPERLIPIDEMIA, UNSPECIFIED HYPERLIPIDEMIA TYPE: ICD-10-CM

## 2025-07-30 DIAGNOSIS — K21.9 GASTROESOPHAGEAL REFLUX DISEASE WITHOUT ESOPHAGITIS: ICD-10-CM

## 2025-07-30 DIAGNOSIS — F32.A DEPRESSION, UNSPECIFIED DEPRESSION TYPE: ICD-10-CM

## 2025-07-30 DIAGNOSIS — M79.7 FIBROMYALGIA: ICD-10-CM

## 2025-07-30 DIAGNOSIS — Z12.31 ENCOUNTER FOR SCREENING MAMMOGRAM FOR BREAST CANCER: ICD-10-CM

## 2025-07-30 DIAGNOSIS — Z86.69 HX OF MIGRAINE HEADACHES: ICD-10-CM

## 2025-07-30 PROCEDURE — 3074F SYST BP LT 130 MM HG: CPT

## 2025-07-30 PROCEDURE — 1159F MED LIST DOCD IN RCRD: CPT

## 2025-07-30 PROCEDURE — 1123F ACP DISCUSS/DSCN MKR DOCD: CPT

## 2025-07-30 PROCEDURE — 3051F HG A1C>EQUAL 7.0%<8.0%: CPT

## 2025-07-30 PROCEDURE — 99214 OFFICE O/P EST MOD 30 MIN: CPT

## 2025-07-30 PROCEDURE — 3079F DIAST BP 80-89 MM HG: CPT

## 2025-07-30 RX ORDER — GLIPIZIDE 10 MG/1
10 TABLET ORAL
Qty: 180 TABLET | Refills: 1 | Status: SHIPPED | OUTPATIENT
Start: 2025-07-30

## 2025-07-30 RX ORDER — GABAPENTIN 400 MG/1
400 CAPSULE ORAL 3 TIMES DAILY
Qty: 270 CAPSULE | Refills: 1 | Status: SHIPPED | OUTPATIENT
Start: 2025-07-30 | End: 2026-01-26

## 2025-07-30 RX ORDER — CYCLOBENZAPRINE HCL 10 MG
TABLET ORAL
Qty: 270 TABLET | Refills: 0 | Status: SHIPPED | OUTPATIENT
Start: 2025-07-30

## 2025-07-30 RX ORDER — BUPROPION HYDROCHLORIDE 100 MG/1
100 TABLET, EXTENDED RELEASE ORAL 2 TIMES DAILY
Qty: 180 TABLET | Refills: 0 | Status: SHIPPED | OUTPATIENT
Start: 2025-07-30

## 2025-07-30 RX ORDER — ROSUVASTATIN CALCIUM 20 MG/1
20 TABLET, COATED ORAL DAILY
Qty: 90 TABLET | Refills: 0 | Status: SHIPPED | OUTPATIENT
Start: 2025-07-30

## 2025-07-30 RX ORDER — TOPIRAMATE 50 MG/1
50 TABLET, FILM COATED ORAL 2 TIMES DAILY
Qty: 180 TABLET | Refills: 1 | Status: SHIPPED | OUTPATIENT
Start: 2025-07-30

## 2025-07-30 RX ORDER — LISINOPRIL 2.5 MG/1
2.5 TABLET ORAL NIGHTLY
Qty: 90 TABLET | Refills: 1 | Status: SHIPPED | OUTPATIENT
Start: 2025-07-30

## 2025-07-30 RX ORDER — METOPROLOL TARTRATE 25 MG/1
TABLET, FILM COATED ORAL
Qty: 270 TABLET | Refills: 0 | Status: SHIPPED | OUTPATIENT
Start: 2025-07-30

## 2025-07-30 RX ORDER — SPIRONOLACTONE AND HYDROCHLOROTHIAZIDE 25; 25 MG/1; MG/1
1 TABLET ORAL DAILY
Qty: 90 TABLET | Refills: 0 | Status: SHIPPED | OUTPATIENT
Start: 2025-07-30

## 2025-07-30 ASSESSMENT — COLUMBIA-SUICIDE SEVERITY RATING SCALE - C-SSRS
6. HAVE YOU EVER DONE ANYTHING, STARTED TO DO ANYTHING, OR PREPARED TO DO ANYTHING TO END YOUR LIFE?: NO
2. HAVE YOU ACTUALLY HAD ANY THOUGHTS OF KILLING YOURSELF?: NO
1. WITHIN THE PAST MONTH, HAVE YOU WISHED YOU WERE DEAD OR WISHED YOU COULD GO TO SLEEP AND NOT WAKE UP?: NO

## 2025-07-30 ASSESSMENT — PATIENT HEALTH QUESTIONNAIRE - PHQ9
6. FEELING BAD ABOUT YOURSELF - OR THAT YOU ARE A FAILURE OR HAVE LET YOURSELF OR YOUR FAMILY DOWN: SEVERAL DAYS
SUM OF ALL RESPONSES TO PHQ QUESTIONS 1-9: 9
9. THOUGHTS THAT YOU WOULD BE BETTER OFF DEAD, OR OF HURTING YOURSELF: SEVERAL DAYS
SUM OF ALL RESPONSES TO PHQ QUESTIONS 1-9: 8
7. TROUBLE CONCENTRATING ON THINGS, SUCH AS READING THE NEWSPAPER OR WATCHING TELEVISION: SEVERAL DAYS
SUM OF ALL RESPONSES TO PHQ QUESTIONS 1-9: 9
4. FEELING TIRED OR HAVING LITTLE ENERGY: SEVERAL DAYS
8. MOVING OR SPEAKING SO SLOWLY THAT OTHER PEOPLE COULD HAVE NOTICED. OR THE OPPOSITE, BEING SO FIGETY OR RESTLESS THAT YOU HAVE BEEN MOVING AROUND A LOT MORE THAN USUAL: SEVERAL DAYS
10. IF YOU CHECKED OFF ANY PROBLEMS, HOW DIFFICULT HAVE THESE PROBLEMS MADE IT FOR YOU TO DO YOUR WORK, TAKE CARE OF THINGS AT HOME, OR GET ALONG WITH OTHER PEOPLE: NOT DIFFICULT AT ALL
2. FEELING DOWN, DEPRESSED OR HOPELESS: SEVERAL DAYS
SUM OF ALL RESPONSES TO PHQ QUESTIONS 1-9: 9
1. LITTLE INTEREST OR PLEASURE IN DOING THINGS: SEVERAL DAYS
3. TROUBLE FALLING OR STAYING ASLEEP: SEVERAL DAYS
5. POOR APPETITE OR OVEREATING: SEVERAL DAYS

## 2025-07-30 NOTE — PATIENT INSTRUCTIONS
Dear Beata Butts,        Thank you for coming to your appointment at [facility].    Please make sure to do the following:    - Continue medications as listed. Contact our office if medications are unavailable at the pharmacy.    - If lab work was ordered please complete as instructed.    - If a referral was placed to see a specialist, please contact our office in 1 week if you have not heard from that office regarding the referral.    - If any imaging test was ordered at today's visit (ultrasound, CT scan, or MRI), expect a phone call to schedule in the next week. You may also call [facility] Scheduling department at [phone] to schedule.    Contact our office if you develop any new or worsening symptoms or if you have any questions regarding today's visit.    Have a great day!        Sincerely,  Iwona Medina, DO   7/30/2025  2:28 PM

## 2025-08-01 ASSESSMENT — ENCOUNTER SYMPTOMS
COUGH: 0
CONSTIPATION: 0
ABDOMINAL PAIN: 0
COLOR CHANGE: 0
SHORTNESS OF BREATH: 0
BACK PAIN: 1
DIARRHEA: 0
VOMITING: 0
RHINORRHEA: 0

## 2025-08-01 NOTE — ASSESSMENT & PLAN NOTE
Well-controlled, continue current medications    Orders:    lisinopril (PRINIVIL;ZESTRIL) 2.5 MG tablet; Take 1 tablet by mouth at bedtime    metoprolol tartrate (LOPRESSOR) 25 MG tablet; TAKE 1 TABLET(25 MG) BY MOUTH THREE TIMES DAILY WITH FOOD    spironolactone-hydroCHLOROthiazide (ALDACTAZIDE) 25-25 MG per tablet; Take 1 tablet by mouth daily

## 2025-08-01 NOTE — ASSESSMENT & PLAN NOTE
Well-controlled, continue current management    Orders:    Humaira Shelby DO, Pain Medicine, [place]    cyclobenzaprine (FLEXERIL) 10 MG tablet; TAKE 1 TABLET BY MOUTH IN THE MORNING, 1 TABLET AT NOON, AND 1 TABLET AT BEDTIME.    gabapentin (NEURONTIN) 400 MG capsule; Take 1 capsule by mouth 3 times daily for 180 days.    topiramate (TOPAMAX) 50 MG tablet; Take 1 tablet by mouth 2 times daily

## 2025-08-01 NOTE — ASSESSMENT & PLAN NOTE
The 10-year ASCVD risk score (Shanita PETERSON, et al., 2019) is: 24.9%    Values used to calculate the score:      Age: 70 years      Sex: Female      Is Non- : No      Diabetic: Yes      Tobacco smoker: Yes      Systolic Blood Pressure: 112 mmHg      Is BP treated: Yes      HDL Cholesterol: 42 mg/dL      Total Cholesterol: 145 mg/dL  Currently on Crestor  LDL 62 in 2/2025  Continue Crestor    Orders:    rosuvastatin (CRESTOR) 20 MG tablet; Take 1 tablet by mouth daily

## 2025-08-01 NOTE — ASSESSMENT & PLAN NOTE
Poorly controlled with A1c at 7.8  Dietary modifications recommended  Will follow-up with repeat A1c at future appointment    Orders:    glipiZIDE (GLUCOTROL) 10 MG tablet; Take 1 tablet by mouth 2 times daily (before meals)    metFORMIN (GLUCOPHAGE) 1000 MG tablet; Take 1 tablet by mouth 2 times daily (with meals)

## 2025-08-01 NOTE — PROGRESS NOTES
Beata Butst (:  1955) is a 70 y.o. female,New patient, here for evaluation of the following chief complaint(s):  New Patient (Having shoulder pain right side. )         Assessment & Plan  Encounter for screening mammogram for breast cancer  Due for mammogram    Orders:    [facility] JEREIME DIGITAL SCREEN BILATERAL; Future    Muscle strain  States that she has had muscle pain for a long time and has been on Flexeril consistently  Discussed referral to pain management for appropriate prescriptions for pain    Orders:    Humaira Shelby DO, Pain Medicine, [place]    cyclobenzaprine (FLEXERIL) 10 MG tablet; TAKE 1 TABLET BY MOUTH IN THE MORNING, 1 TABLET AT NOON, AND 1 TABLET AT BEDTIME.    Essential hypertension  Well-controlled, continue current medications    Orders:    lisinopril (PRINIVIL;ZESTRIL) 2.5 MG tablet; Take 1 tablet by mouth at bedtime    metoprolol tartrate (LOPRESSOR) 25 MG tablet; TAKE 1 TABLET(25 MG) BY MOUTH THREE TIMES DAILY WITH FOOD    spironolactone-hydroCHLOROthiazide (ALDACTAZIDE) 25-25 MG per tablet; Take 1 tablet by mouth daily    COPD exacerbation (HCC)  Well-controlled, continue current management         Gastroesophageal reflux disease without esophagitis  Well-controlled, continue current management         Type 2 diabetes mellitus without complication, without long-term current use of insulin (HCC)  Poorly controlled with A1c at 7.8  Dietary modifications recommended  Will follow-up with repeat A1c at future appointment    Orders:    glipiZIDE (GLUCOTROL) 10 MG tablet; Take 1 tablet by mouth 2 times daily (before meals)    metFORMIN (GLUCOPHAGE) 1000 MG tablet; Take 1 tablet by mouth 2 times daily (with meals)    Hyperlipidemia, unspecified hyperlipidemia type   The 10-year ASCVD risk score (Shanita PETERSON, et al., 2019) is: 24.9%    Values used to calculate the score:      Age: 70 years      Sex: Female      Is Non- : No      Diabetic: Yes

## (undated) DEVICE — GOWN,AURORA,BRTHSLV,2XL,18/CS: Brand: MEDLINE

## (undated) DEVICE — BIT DRL L145MM DIA4.2MM NONSTERILE 3 FLUT NDL PNT QUIK CPL

## (undated) DEVICE — BIT DRL QC 2.8X200 MM 110 MM CALIB STRL

## (undated) DEVICE — BIT DRL QC 2.8X200 MM 110 MM CALIB NS

## (undated) DEVICE — FORCEPS BX OVL CUP FEN DISPOSABLE CAP L 160CM RAD JAW 4

## (undated) DEVICE — Device

## (undated) DEVICE — GUIDEWIRE ORTH L400MM DIA3.2MM FOR TFN

## (undated) DEVICE — REAGENT TEST UREASE RAPD CLOTEST F/

## (undated) DEVICE — HANDPIECE SET WITH BONE CLEANING TIP AND SUCTION TUBE: Brand: INTERPULSE

## (undated) DEVICE — LOWER EXT HIP DRAPE: Brand: MEDLINE INDUSTRIES, INC.

## (undated) DEVICE — BLOCK BITE 60FR RUBBER ADLT DENTAL

## (undated) DEVICE — GRADUATE TRIANG MEASURE 1000ML BLK PRNT

## (undated) DEVICE — BIT DRL L145MM DIA4.2MM ST 3 FLUT NDL PNT QUIK CPL FOR FEM

## (undated) DEVICE — DRESSING HYDROFIBER AQUACEL AG ADVANTAGE 3.5X10 IN

## (undated) DEVICE — DRESSING HYDROFIBER AQUACEL AG ADVANTAGE 3.5X6 IN

## (undated) DEVICE — BIT DRILL CANN 12.8 LRG QC

## (undated) DEVICE — DRAPE EQUIP CARM 72X42 IN RUBBER BND CLP

## (undated) DEVICE — TUBING SUCT 12FR MAL ALUM SHFT FN CAP VENT UNIV CONN W/ OBT

## (undated) DEVICE — ELECTRODE PT RET AD L9FT HI MOIST COND ADH HYDRGEL CORDED

## (undated) DEVICE — ROD RMR L950MM DIA2.5MM W/ EXTN BALL TIP

## (undated) DEVICE — BIT DRILL CALIBRATED 4.2 EX-LONG